# Patient Record
Sex: FEMALE | Race: WHITE | NOT HISPANIC OR LATINO | URBAN - METROPOLITAN AREA
[De-identification: names, ages, dates, MRNs, and addresses within clinical notes are randomized per-mention and may not be internally consistent; named-entity substitution may affect disease eponyms.]

---

## 2018-06-28 ENCOUNTER — IMPORTED ENCOUNTER (OUTPATIENT)
Dept: URBAN - METROPOLITAN AREA CLINIC 50 | Facility: CLINIC | Age: 73
End: 2018-06-28

## 2018-07-03 ENCOUNTER — IMPORTED ENCOUNTER (OUTPATIENT)
Dept: URBAN - METROPOLITAN AREA CLINIC 50 | Facility: CLINIC | Age: 73
End: 2018-07-03

## 2019-04-17 ENCOUNTER — IMPORTED ENCOUNTER (OUTPATIENT)
Dept: URBAN - METROPOLITAN AREA CLINIC 50 | Facility: CLINIC | Age: 74
End: 2019-04-17

## 2020-07-27 ENCOUNTER — IMPORTED ENCOUNTER (OUTPATIENT)
Dept: URBAN - METROPOLITAN AREA CLINIC 50 | Facility: CLINIC | Age: 75
End: 2020-07-27

## 2021-04-18 ASSESSMENT — VISUAL ACUITY
OS_CC: J2@ 13 IN
OD_PH: 20/15
OS_SC: 20/30+2
OD_SC: 20/40+2
OD_CC: J2@ 13 IN

## 2021-04-18 ASSESSMENT — TONOMETRY
OD_IOP_MMHG: 17
OS_IOP_MMHG: 17

## 2022-02-24 ENCOUNTER — NEW PATIENT (OUTPATIENT)
Dept: URBAN - METROPOLITAN AREA CLINIC 48 | Facility: CLINIC | Age: 77
End: 2022-02-24

## 2022-02-24 DIAGNOSIS — H35.3131: ICD-10-CM

## 2022-02-24 DIAGNOSIS — D31.32: ICD-10-CM

## 2022-02-24 DIAGNOSIS — H34.8322: ICD-10-CM

## 2022-02-24 DIAGNOSIS — H35.373: ICD-10-CM

## 2022-02-24 PROCEDURE — 92134 CPTRZ OPH DX IMG PST SGM RTA: CPT

## 2022-02-24 PROCEDURE — 92004 COMPRE OPH EXAM NEW PT 1/>: CPT

## 2022-02-24 ASSESSMENT — KERATOMETRY
OD_AXISANGLE2_DEGREES: 95
OD_K1POWER_DIOPTERS: 44.75
OD_K2POWER_DIOPTERS: 45.75
OS_AXISANGLE2_DEGREES: 75
OS_AXISANGLE_DEGREES: 165
OS_K1POWER_DIOPTERS: 44.25
OD_AXISANGLE_DEGREES: 5
OS_K2POWER_DIOPTERS: 45.25

## 2022-02-24 ASSESSMENT — VISUAL ACUITY
OU_SC: J1
OS_SC: 20/40
OD_SC: 20/40

## 2022-02-24 ASSESSMENT — TONOMETRY
OS_IOP_MMHG: 18
OD_IOP_MMHG: 18

## 2022-09-26 ENCOUNTER — TELEPHONE (OUTPATIENT)
Dept: CARDIOLOGY | Facility: CLINIC | Age: 77
End: 2022-09-26

## 2022-09-26 NOTE — TELEPHONE ENCOUNTER
Caller: Sallie Daily    Relationship: Self    Best call back number: [phone]    What is the best time to reach you: ANY    Who are you requesting to speak with (clinical staff, provider,  specific staff member): ANY        What was the call regarding: PT WILL BE SEEN FOR NEW PT APPOINTMENT ON 11.23.22 WHICH IS LITTLE BIT FAR OUT. SHE HAS A BLOOD PRESSURE READING /100. SHE IS WANTING TO BE CALLED IF THERE ARE ANY CANCELLATIONS OR IF SHE COULD BE SEEN A LITTLE SOONER    Do you require a callback: YES

## 2022-10-04 ENCOUNTER — TELEPHONE (OUTPATIENT)
Dept: INTERNAL MEDICINE | Facility: CLINIC | Age: 77
End: 2022-10-04

## 2022-10-06 ENCOUNTER — OFFICE VISIT (OUTPATIENT)
Dept: INTERNAL MEDICINE | Facility: CLINIC | Age: 77
End: 2022-10-06

## 2022-10-06 VITALS
WEIGHT: 186 LBS | BODY MASS INDEX: 36.52 KG/M2 | DIASTOLIC BLOOD PRESSURE: 80 MMHG | RESPIRATION RATE: 16 BRPM | HEIGHT: 60 IN | SYSTOLIC BLOOD PRESSURE: 186 MMHG | HEART RATE: 75 BPM | TEMPERATURE: 98 F | OXYGEN SATURATION: 97 %

## 2022-10-06 DIAGNOSIS — G25.81 RESTLESS LEG SYNDROME: ICD-10-CM

## 2022-10-06 DIAGNOSIS — E78.2 MIXED HYPERLIPIDEMIA: ICD-10-CM

## 2022-10-06 DIAGNOSIS — L29.9 PRURITIC DERMATITIS: ICD-10-CM

## 2022-10-06 DIAGNOSIS — Z23 NEED FOR IMMUNIZATION AGAINST INFLUENZA: ICD-10-CM

## 2022-10-06 DIAGNOSIS — I10 BENIGN ESSENTIAL HYPERTENSION: Primary | ICD-10-CM

## 2022-10-06 DIAGNOSIS — F41.9 ANXIETY: ICD-10-CM

## 2022-10-06 DIAGNOSIS — F33.0 MILD EPISODE OF RECURRENT MAJOR DEPRESSIVE DISORDER: ICD-10-CM

## 2022-10-06 DIAGNOSIS — F51.04 INSOMNIA, PSYCHOPHYSIOLOGICAL: ICD-10-CM

## 2022-10-06 DIAGNOSIS — R73.01 IMPAIRED FASTING GLUCOSE: ICD-10-CM

## 2022-10-06 DIAGNOSIS — E03.8 ADULT ONSET HYPOTHYROIDISM: ICD-10-CM

## 2022-10-06 PROCEDURE — 99204 OFFICE O/P NEW MOD 45 MIN: CPT | Performed by: INTERNAL MEDICINE

## 2022-10-06 PROCEDURE — 90662 IIV NO PRSV INCREASED AG IM: CPT | Performed by: INTERNAL MEDICINE

## 2022-10-06 PROCEDURE — G0008 ADMIN INFLUENZA VIRUS VAC: HCPCS | Performed by: INTERNAL MEDICINE

## 2022-10-06 RX ORDER — TRIAMCINOLONE ACETONIDE 1 MG/G
1 CREAM TOPICAL 2 TIMES DAILY
Qty: 15 G | Refills: 5 | Status: SHIPPED | OUTPATIENT
Start: 2022-10-06

## 2022-10-06 RX ORDER — ALLOPURINOL 300 MG/1
300 TABLET ORAL DAILY
Qty: 30 TABLET | Refills: 3 | Status: SHIPPED | OUTPATIENT
Start: 2022-10-06

## 2022-10-06 NOTE — PROGRESS NOTES
Subjective   Sallie Daily is a 77 y.o. female.     Chief Complaint   Patient presents with   • Establish Care   • Hypertension   • Hyperlipidemia   • Hypothyroidism       History of Present Illness   HPI: Patient is here for initial visit, today she is accompanied by her daughter, patient is here to follow up on the blood pressure which is noted to be elevated, the patient is taking the blood pressure medications as prescribed and has had no side effects. The patient is also here to follow up on the cholesterol and is trying to follow a diet. The patient is  also here to follow up on thyroid and is  due to get lab work done .  The patient also complains of restless legs and needs refills on medications .  She complains of chronic leg swelling for which she is on chlorthalidone and had cellulitis recently she does have itching in her lower extremities, she needs a flu shot today, patient also complains of anxiety and depression and insomnia, she states she recently had gout and was put on allopurinol and needs refills  Hyperlipidemia   Pertinent negatives include no chest pain or shortness of breath.   Hypertension   Pertinent negatives include no chest pain, palpitations or shortness of breath.    Review of Systems  Rash  Edema  Anxiety  Depression  Insomnia  Restless leg    Past Medical History:   Diagnosis Date   • Anemia    • Arthritis    • Cellulitis    • Colon polyp    • Diverticulitis    • GERD (gastroesophageal reflux disease)    • Gout    • Heart attack (HCC)     3/2021  mi   • Hyperlipidemia    • Osteoporosis    • Thyroid disease        Past Surgical History:   Procedure Laterality Date   • COLONOSCOPY      2/2022, q1yr , [facility]   • NECK SURGERY     • REPLACEMENT TOTAL KNEE     • ROTATOR CUFF REPAIR         Family History   Problem Relation Age of Onset   • Thyroid disease Mother    • Osteoporosis Mother    • Obesity Mother    • Heart attack Mother    • Arthritis Mother    • Obesity Father     • Heart attack Father    • Cancer Father         prostate   • Thyroid disease Sister    • Thyroid disease Sister    • Thyroid disease Daughter         reports that she has never smoked. She has never used smokeless tobacco. She reports current alcohol use. She reports that she does not use drugs.    Allergies   Allergen Reactions   • Sulfa Antibiotics Hives and Other (See Comments)           Current Outpatient Medications:   •  Bacillus Coagulans-Inulin (Probiotic) 1-250 BILLION-MG capsule, Probiotic, Disp: , Rfl:   •  Cholecalciferol 125 MCG (5000 UT) tablet, Vitamin D3 125 mcg (5,000 unit) tablet  Take by oral route., Disp: , Rfl:   •  Cinnamon 500 MG capsule, Cinnamon  QD, Disp: , Rfl:   •  cloNIDine (CATAPRES) 0.1 MG tablet, Take 0.1 mg by mouth Daily., Disp: , Rfl:   •  cyanocobalamin (VITAMIN B-12) 1000 MCG tablet, cyanocobalamin (vitamin B-12), Disp: , Rfl:   •  Iron-Vitamin C 100-250 MG tablet, iron  QD, Disp: , Rfl:   •  levothyroxine (SYNTHROID, LEVOTHROID) 150 MCG tablet, levothyroxine 150 mcg tablet, Disp: , Rfl:   •  Melatonin 10 MG capsule, melatonin 10 mg capsule  Take 1 capsule every day by oral route., Disp: , Rfl:   •  pramipexole (MIRAPEX) 0.5 MG tablet, Every 12 (Twelve) Hours., Disp: , Rfl:   •  sertraline (ZOLOFT) 100 MG tablet, Zoloft 100 mg tablet  Take 1 tablet every day by oral route., Disp: , Rfl:   •  tiZANidine (ZANAFLEX) 4 MG tablet, tizanidine 4 mg tablet, Disp: , Rfl:   •  valsartan (DIOVAN) 320 MG tablet, valsartan 320 mg tablet, Disp: , Rfl:   •  allopurinol (Zyloprim) 300 MG tablet, Take 1 tablet by mouth Daily., Disp: 30 tablet, Rfl: 3  •  atorvastatin (LIPITOR) 20 MG tablet, Take 1 tablet by mouth Daily., Disp: , Rfl:   •  chlorthalidone (HYGROTON) 25 MG tablet, Take 1 tablet by mouth Daily., Disp: , Rfl:   •  levocetirizine (XYZAL) 5 MG tablet, Take 1 tablet by mouth Daily., Disp: , Rfl:   •  Omega-3 Fatty Acids (fish oil) 1000 MG capsule capsule, Take 1,000 mg by mouth Daily  "With Breakfast., Disp: , Rfl:   •  omeprazole (priLOSEC) 20 MG capsule, Take 1 capsule by mouth Daily., Disp: , Rfl:   •  triamcinolone (KENALOG) 0.1 % cream, Apply 1 application topically to the appropriate area as directed 2 (Two) Times a Day., Disp: 15 g, Rfl: 5  •  TURMERIC CURCUMIN PO, Take 1,000 mg by mouth Daily., Disp: , Rfl:       Objective   Blood pressure (!) 186/80, pulse 75, temperature 98 °F (36.7 °C), resp. rate 16, height 152.4 cm (60\"), weight 84.4 kg (186 lb), SpO2 97 %.    Physical Exam  Vitals and nursing note reviewed.   Constitutional:       General: She is not in acute distress.     Appearance: Normal appearance. She is not diaphoretic.   HENT:      Head: Normocephalic and atraumatic.      Right Ear: External ear normal.      Left Ear: External ear normal.      Nose: Nose normal.   Eyes:      Extraocular Movements: Extraocular movements intact.      Conjunctiva/sclera: Conjunctivae normal.   Neck:      Trachea: Trachea normal.   Cardiovascular:      Rate and Rhythm: Normal rate and regular rhythm.      Heart sounds: Normal heart sounds.   Pulmonary:      Effort: Pulmonary effort is normal. No respiratory distress.      Breath sounds: Normal breath sounds.   Abdominal:      General: Abdomen is flat.   Musculoskeletal:      Cervical back: Neck supple.      Right lower leg: Edema present.      Left lower leg: Edema present.      Comments: Moves all limbs  walks with walker   Skin:     General: Skin is warm.      Findings: Erythema present.   Neurological:      Mental Status: She is alert and oriented to person, place, and time.      Comments: No gross motor or sensory deficits         Class 2 Severe Obesity (BMI >=35 and <=39.9). Obesity-related health conditions include the following: hypertension. Obesity is improving with lifestyle modifications. BMI is is above average; BMI management plan is completed. We discussed low calorie, low carb based diet program, portion control, increasing " exercise and joining a fitness center or start home based exercise program.      No results found for this or any previous visit.      Assessment & Plan   Diagnoses and all orders for this visit:    1. Benign essential hypertension (Primary)    2. Mixed hyperlipidemia  -     CBC & Differential  -     Comprehensive Metabolic Panel  -     Lipid Panel    3. Impaired fasting glucose  -     Hemoglobin A1c    4. Adult onset hypothyroidism  -     TSH    5. Restless leg syndrome    6. Anxiety    7. Mild episode of recurrent major depressive disorder (HCC)    8. Insomnia, psychophysiological    9. Need for immunization against influenza  -     Fluzone High-Dose 65+yrs ([id number])    10. Pruritic dermatitis  -     triamcinolone (KENALOG) 0.1 % cream; Apply 1 application topically to the appropriate area as directed 2 (Two) Times a Day.  Dispense: 15 g; Refill: 5    Other orders  -     allopurinol (Zyloprim) 300 MG tablet; Take 1 tablet by mouth Daily.  Dispense: 30 tablet; Refill: 3      Plan:  1.  Benign essential hypertension: Will continue current medication, low-sodium diet advised, Counseled to regularly check BP at home with goal averaging <130/80.   2.mixed hyperlipidemia: will obtain   fasting CMP and lipid panel.  Diet and exercise counseled,    Will continue current medications  3.  Impaired glucose: will obtain   fasting CMP  and hba1c  , diet and exercise counseled ,   4. hypothyroidism: will obtain tsh , and continue levothyroxine  5.  Restless leg syndrome: Continue pramipexole  6.  Anxiety disorder: We will obtain labs and continue Zoloft  7.  Major depression: We will obtain labs and continue Zoloft  8.  Insomnia: We will monitor, on melatonin  9.  Need for flu vaccine: Given today and well-tolerated  10.  Pruritic dermatitis: Topical triamcinolone  11.  Gout: Refill allopurinol  I spent 45  minutes caring for  [place]  on this date of service. This time includes time spent by me in the following  activities:preparing for the visit, reviewing tests, performing a medically appropriate examination and/or evaluation , counseling and educating the patient/family/caregiver, ordering medications, tests, or procedures and documenting information in the medical record             Merle Fernández MD

## 2022-10-07 RX ORDER — CHLORTHALIDONE 25 MG/1
1 TABLET ORAL DAILY
COMMUNITY
Start: 2022-08-31 | End: 2022-10-19 | Stop reason: SDUPTHER

## 2022-10-07 RX ORDER — LEVOCETIRIZINE DIHYDROCHLORIDE 5 MG/1
1 TABLET, FILM COATED ORAL DAILY
COMMUNITY
Start: 2022-07-04

## 2022-10-07 RX ORDER — CHLORAL HYDRATE 500 MG
1000 CAPSULE ORAL
COMMUNITY

## 2022-10-07 RX ORDER — ATORVASTATIN CALCIUM 20 MG/1
1 TABLET, FILM COATED ORAL DAILY
COMMUNITY
Start: 2022-09-15 | End: 2022-10-16 | Stop reason: SDUPTHER

## 2022-10-07 RX ORDER — OMEPRAZOLE 20 MG/1
1 CAPSULE, DELAYED RELEASE ORAL DAILY
COMMUNITY
Start: 2022-07-21

## 2022-10-13 ENCOUNTER — OFFICE VISIT (OUTPATIENT)
Dept: INTERNAL MEDICINE | Facility: CLINIC | Age: 77
End: 2022-10-13

## 2022-10-13 VITALS
OXYGEN SATURATION: 92 % | HEIGHT: 60 IN | TEMPERATURE: 98 F | WEIGHT: 184 LBS | HEART RATE: 101 BPM | BODY MASS INDEX: 36.12 KG/M2 | SYSTOLIC BLOOD PRESSURE: 183 MMHG | RESPIRATION RATE: 16 BRPM | DIASTOLIC BLOOD PRESSURE: 78 MMHG

## 2022-10-13 DIAGNOSIS — I10 BENIGN ESSENTIAL HYPERTENSION: Primary | ICD-10-CM

## 2022-10-13 DIAGNOSIS — E03.8 ADULT ONSET HYPOTHYROIDISM: ICD-10-CM

## 2022-10-13 DIAGNOSIS — N18.30 STAGE 3 CHRONIC KIDNEY DISEASE, UNSPECIFIED WHETHER STAGE 3A OR 3B CKD: ICD-10-CM

## 2022-10-13 DIAGNOSIS — E78.2 MIXED HYPERLIPIDEMIA: ICD-10-CM

## 2022-10-13 PROCEDURE — 99214 OFFICE O/P EST MOD 30 MIN: CPT | Performed by: INTERNAL MEDICINE

## 2022-10-13 RX ORDER — HYDRALAZINE HYDROCHLORIDE 25 MG/1
25 TABLET, FILM COATED ORAL 2 TIMES DAILY
Qty: 60 TABLET | Refills: 5 | Status: SHIPPED | OUTPATIENT
Start: 2022-10-13 | End: 2022-10-19 | Stop reason: SDUPTHER

## 2022-10-13 RX ORDER — CARVEDILOL 25 MG/1
25 TABLET ORAL 2 TIMES DAILY WITH MEALS
Qty: 60 TABLET | Refills: 5 | Status: SHIPPED | OUTPATIENT
Start: 2022-10-13 | End: 2022-10-19 | Stop reason: SDUPTHER

## 2022-10-13 RX ORDER — CLONIDINE 0.2 MG/24H
1 PATCH, EXTENDED RELEASE TRANSDERMAL WEEKLY
COMMUNITY
Start: 2022-10-06 | End: 2022-10-13

## 2022-10-13 NOTE — PROGRESS NOTES
"Chief Complaint  Hypertension, Hyperlipidemia, and Chronic Kidney Disease    Subjective    {Problem List  Visit Diagnosis   Encounters  Notes  Medications  Labs  Result Review Imaging  Media :23}    Sallie Daily presents to [facility]  History of Present Illness   HPI: Patient is here to follow up on the blood pressure which is noted to be elevated, she has clonidine pills at home and patch and she has been using the clonidine patch and her blood pressure is yet elevated, the patient is also here to follow up on the cholesterol and is trying to follow a diet. The patient is  also here to follow up on thyroid and CKD and had lab work done .  The patient also needs refills on medications .  She has brought in her labs from her previous PCP for review  Hyperlipidemia   Pertinent negatives include no chest pain or shortness of breath.   Hypertension   Pertinent negatives include no chest pain, palpitations or shortness of breath.    Answers for HPI/ROS submitted by the patient on 10/11/2022  Please describe your symptoms.: Follow-up  Have you had these symptoms before?: Yes  How long have you been having these symptoms?: Greater than 2 weeks  Please list any medications you are currently taking for this condition.: Allopurinol  What is the primary reason for your visit?: Other      Objective   Vital Signs:  BP (!) 183/78   Pulse 101   Temp 98 °F (36.7 °C)   Resp 16   Ht 152.4 cm (60\")   Wt 83.5 kg (184 lb)   SpO2 92%   BMI 35.94 kg/m²   Estimated body mass index is 35.94 kg/m² as calculated from the following:    Height as of this encounter: 152.4 cm (60\").    Weight as of this encounter: 83.5 kg (184 lb).      Physical Exam  Vitals and nursing note reviewed.   Constitutional:       General: She is not in acute distress.     Appearance: Normal appearance. She is not diaphoretic.   HENT:      Head: Normocephalic and atraumatic.      Right Ear: External ear normal.      " Left Ear: External ear normal.      Nose: Nose normal.   Eyes:      Extraocular Movements: Extraocular movements intact.      Conjunctiva/sclera: Conjunctivae normal.   Neck:      Trachea: Trachea normal.   Cardiovascular:      Rate and Rhythm: Normal rate and regular rhythm.      Heart sounds: Normal heart sounds.   Pulmonary:      Effort: Pulmonary effort is normal. No respiratory distress.      Breath sounds: Normal breath sounds.   Abdominal:      General: Abdomen is flat.   Musculoskeletal:      Cervical back: Neck supple.      Right lower leg: Edema present.      Left lower leg: Edema present.      Comments: Moves all limbs   Skin:     General: Skin is warm.   Neurological:      Mental Status: She is alert and oriented to person, place, and time.      Comments: No gross motor or sensory deficits        Result Review :                Assessment and Plan   Diagnoses and all orders for this visit:    1. Benign essential hypertension (Primary)  -     carvedilol (Coreg) 25 MG tablet; Take 1 tablet by mouth 2 (Two) Times a Day With Meals.  Dispense: 60 tablet; Refill: 5  -     hydrALAZINE (APRESOLINE) 25 MG tablet; Take 1 tablet by mouth 2 (Two) Times a Day.  Dispense: 60 tablet; Refill: 5    2. Mixed hyperlipidemia  -     CBC & Differential  -     Comprehensive Metabolic Panel  -     Lipid Panel    3. Adult onset hypothyroidism  -     TSH    4. Stage 3 chronic kidney disease, unspecified whether stage 3a or 3b CKD (HCC)      Plan:  1.  Benign essential hypertension: Patient advised to stop clonidine at this time and use it as needed and parameters provided, will start carvedilol 25 mg p.o. twice daily and hydralazine 25 mg p.o. twice daily, continue valsartan 320 mg daily with chlorthalidone 25 mg daily, low-sodium diet advised, Counseled to regularly check BP at home with goal averaging <130/80.   2.mixed hyperlipidemia:  reviewed  fasting CMP and lipid panel.  Diet and exercise counseled,  Will continue current  medications  3. hypothyroidism:  reviewed  tsh , and continue levothyroxine  4.  CKD stage III: Oral hydration, monitor BMP     I spent 30 minutes caring for [place] on this date of service. This time includes time spent by me in the following activities:preparing for the visit, reviewing tests, performing a medically appropriate examination and/or evaluation , counseling and educating the patient/family/caregiver, ordering medications, tests, or procedures and documenting information in the medical record  Follow Up   Patient was given instructions and counseling regarding her condition or for health maintenance advice. Please see specific information pulled into the AVS if appropriate.

## 2022-10-17 NOTE — TELEPHONE ENCOUNTER
Rx Refill Note  Requested Prescriptions     Pending Prescriptions Disp Refills   • levothyroxine (SYNTHROID, LEVOTHROID) 150 MCG tablet     • pramipexole (MIRAPEX) 0.5 MG tablet       Sig: Every 12 (Twelve) Hours.   • atorvastatin (LIPITOR) 20 MG tablet 90 tablet      Sig: Take 1 tablet by mouth Daily.      Last office visit with prescribing clinician: 10/13/2022      Next office visit with prescribing clinician:             Corinna Pavon LPN  10/17/22, 16:50 EDT

## 2022-10-19 ENCOUNTER — OFFICE VISIT (OUTPATIENT)
Dept: INTERNAL MEDICINE | Facility: CLINIC | Age: 77
End: 2022-10-19

## 2022-10-19 VITALS
WEIGHT: 186 LBS | RESPIRATION RATE: 16 BRPM | OXYGEN SATURATION: 98 % | DIASTOLIC BLOOD PRESSURE: 82 MMHG | HEIGHT: 60 IN | HEART RATE: 73 BPM | SYSTOLIC BLOOD PRESSURE: 188 MMHG | BODY MASS INDEX: 36.52 KG/M2 | TEMPERATURE: 97.8 F

## 2022-10-19 DIAGNOSIS — I10 BENIGN ESSENTIAL HYPERTENSION: Primary | ICD-10-CM

## 2022-10-19 DIAGNOSIS — R60.0 LOCALIZED EDEMA: ICD-10-CM

## 2022-10-19 DIAGNOSIS — N18.30 STAGE 3 CHRONIC KIDNEY DISEASE, UNSPECIFIED WHETHER STAGE 3A OR 3B CKD: ICD-10-CM

## 2022-10-19 PROCEDURE — 99214 OFFICE O/P EST MOD 30 MIN: CPT | Performed by: INTERNAL MEDICINE

## 2022-10-19 RX ORDER — HYDRALAZINE HYDROCHLORIDE 50 MG/1
50 TABLET, FILM COATED ORAL 2 TIMES DAILY
Qty: 180 TABLET | Refills: 1 | Status: SHIPPED | OUTPATIENT
Start: 2022-10-19 | End: 2022-11-23 | Stop reason: SDUPTHER

## 2022-10-19 RX ORDER — LIOTHYRONINE SODIUM 5 UG/1
5 TABLET ORAL DAILY
COMMUNITY
End: 2022-11-23

## 2022-10-19 RX ORDER — CHLORTHALIDONE 25 MG/1
25 TABLET ORAL DAILY
Qty: 90 TABLET | Refills: 1 | Status: SHIPPED | OUTPATIENT
Start: 2022-10-19

## 2022-10-19 RX ORDER — VALSARTAN 320 MG/1
320 TABLET ORAL DAILY
Qty: 90 TABLET | Refills: 1 | Status: SHIPPED | OUTPATIENT
Start: 2022-10-19

## 2022-10-19 RX ORDER — CARVEDILOL 25 MG/1
25 TABLET ORAL 2 TIMES DAILY WITH MEALS
Qty: 180 TABLET | Refills: 1 | Status: SHIPPED | OUTPATIENT
Start: 2022-10-19

## 2022-10-19 NOTE — PROGRESS NOTES
"Chief Complaint  Hypertension and Chronic Kidney Disease    Subjective        Sallie Daily presents to [facility]  History of Present Illness   HPI: Patient is here to follow up on the blood pressure which is noted to be elevated, home blood pressure readings have been reviewed with her today, her blood pressure medications were readjusted at the last visit and the patient is taking the blood pressure medications as prescribed and has had no side effects. The patient is also here to follow up on leg swelling and CKD   Hypertension   Pertinent negatives include no chest pain, palpitations or shortness of breath.    Answers for HPI/ROS submitted by the patient on 10/13/2022  What is the primary reason for your visit?: High Blood Pressure      Objective   Vital Signs:  BP (!) 188/82   Pulse 73   Temp 97.8 °F (36.6 °C)   Resp 16   Ht 152.4 cm (60\")   Wt 84.4 kg (186 lb)   SpO2 98%   BMI 36.33 kg/m²   Estimated body mass index is 36.33 kg/m² as calculated from the following:    Height as of this encounter: 152.4 cm (60\").    Weight as of this encounter: 84.4 kg (186 lb).    Class 2 Severe Obesity (BMI >=35 and <=39.9). Obesity-related health conditions include the following: hypertension and dyslipidemias. Obesity is improving with lifestyle modifications. BMI is is above average; BMI management plan is completed. We discussed low calorie, low carb based diet program, portion control, increasing exercise and joining a fitness center or start home based exercise program.      Physical Exam  Vitals and nursing note reviewed.   Constitutional:       General: She is not in acute distress.     Appearance: Normal appearance. She is not diaphoretic.   HENT:      Head: Normocephalic and atraumatic.      Right Ear: External ear normal.      Left Ear: External ear normal.      Nose: Nose normal.   Eyes:      Extraocular Movements: Extraocular movements intact.      Conjunctiva/sclera: " Conjunctivae normal.   Neck:      Trachea: Trachea normal.   Cardiovascular:      Rate and Rhythm: Normal rate and regular rhythm.      Heart sounds: Normal heart sounds.   Pulmonary:      Effort: Pulmonary effort is normal. No respiratory distress.      Breath sounds: Normal breath sounds.   Abdominal:      General: Abdomen is flat.   Musculoskeletal:      Cervical back: Neck supple.      Comments: Moves all limbs   Skin:     General: Skin is warm.   Neurological:      Mental Status: She is alert and oriented to person, place, and time.      Comments: No gross motor or sensory deficits        Result Review :                Assessment and Plan   Diagnoses and all orders for this visit:    1. Benign essential hypertension (Primary)  -     hydrALAZINE (APRESOLINE) 50 MG tablet; Take 1 tablet by mouth 2 (Two) Times a Day.  Dispense: 180 tablet; Refill: 1  -     chlorthalidone (HYGROTON) 25 MG tablet; Take 1 tablet by mouth Daily.  Dispense: 90 tablet; Refill: 1  -     carvedilol (Coreg) 25 MG tablet; Take 1 tablet by mouth 2 (Two) Times a Day With Meals.  Dispense: 180 tablet; Refill: 1  -     valsartan (DIOVAN) 320 MG tablet; Take 1 tablet by mouth Daily.  Dispense: 90 tablet; Refill: 1    2. Stage 3 chronic kidney disease, unspecified whether stage 3a or 3b CKD (HCC)    3. Localized edema    Plan:  1.  Benign essential hypertension: Will continue current medication and increase to hydralazine 50 mg p.o. twice daily, low-sodium diet advised, Counseled to regularly check BP at home with goal averaging <130/80.   2.  CKD stage III: Oral hydration, monitor BMP  3.  Edema: We will continue diuretic, low-sodium diet advised      I spent 30 minutes caring for [place] on this date of service. This time includes time spent by me in the following activities:preparing for the visit, reviewing tests, performing a medically appropriate examination and/or evaluation , counseling and educating the patient/family/caregiver,  ordering medications, tests, or procedures and documenting information in the medical record  Follow Up   Return in about 2 months (around 12/20/2022).  Patient was given instructions and counseling regarding her condition or for health maintenance advice. Please see specific information pulled into the AVS if appropriate.

## 2022-10-20 RX ORDER — ATORVASTATIN CALCIUM 20 MG/1
20 TABLET, FILM COATED ORAL DAILY
Qty: 90 TABLET | Refills: 1 | Status: SHIPPED | OUTPATIENT
Start: 2022-10-20

## 2022-10-20 RX ORDER — LEVOTHYROXINE SODIUM 0.15 MG/1
150 TABLET ORAL DAILY
Qty: 90 TABLET | Refills: 1 | Status: SHIPPED | OUTPATIENT
Start: 2022-10-20

## 2022-10-20 RX ORDER — PRAMIPEXOLE DIHYDROCHLORIDE 0.5 MG/1
0.5 TABLET ORAL EVERY 12 HOURS SCHEDULED
Qty: 180 TABLET | Refills: 1 | Status: SHIPPED | OUTPATIENT
Start: 2022-10-20

## 2022-10-27 NOTE — TELEPHONE ENCOUNTER
Rx Refill Note  Requested Prescriptions     Pending Prescriptions Disp Refills   • liothyronine (CYTOMEL) 5 MCG tablet       Sig: Take 1 tablet by mouth Daily.      Last office visit with prescribing clinician: 10/19/2022      Next office visit with prescribing clinician: 12/21/2022            Alhaji Ramey CMA  10/27/22, 07:59 EDT

## 2022-10-28 RX ORDER — LIOTHYRONINE SODIUM 5 UG/1
5 TABLET ORAL DAILY
OUTPATIENT
Start: 2022-10-28

## 2022-11-10 ENCOUNTER — OFFICE VISIT (OUTPATIENT)
Dept: CARDIOLOGY | Facility: CLINIC | Age: 77
End: 2022-11-10

## 2022-11-10 VITALS
RESPIRATION RATE: 18 BRPM | SYSTOLIC BLOOD PRESSURE: 148 MMHG | WEIGHT: 188 LBS | BODY MASS INDEX: 36.91 KG/M2 | HEIGHT: 60 IN | OXYGEN SATURATION: 94 % | HEART RATE: 73 BPM | DIASTOLIC BLOOD PRESSURE: 70 MMHG

## 2022-11-10 DIAGNOSIS — E66.01 SEVERE OBESITY (BMI 35.0-39.9) WITH COMORBIDITY: ICD-10-CM

## 2022-11-10 DIAGNOSIS — I25.10 CORONARY ARTERY DISEASE INVOLVING NATIVE CORONARY ARTERY OF NATIVE HEART WITHOUT ANGINA PECTORIS: ICD-10-CM

## 2022-11-10 DIAGNOSIS — N18.30 STAGE 3 CHRONIC KIDNEY DISEASE, UNSPECIFIED WHETHER STAGE 3A OR 3B CKD: ICD-10-CM

## 2022-11-10 DIAGNOSIS — I10 ESSENTIAL HYPERTENSION: Primary | ICD-10-CM

## 2022-11-10 DIAGNOSIS — E78.00 PURE HYPERCHOLESTEROLEMIA: ICD-10-CM

## 2022-11-10 DIAGNOSIS — I44.7 LBBB (LEFT BUNDLE BRANCH BLOCK): ICD-10-CM

## 2022-11-10 PROCEDURE — 93000 ELECTROCARDIOGRAM COMPLETE: CPT | Performed by: INTERNAL MEDICINE

## 2022-11-10 PROCEDURE — 99204 OFFICE O/P NEW MOD 45 MIN: CPT | Performed by: INTERNAL MEDICINE

## 2022-11-10 NOTE — PROGRESS NOTES
[facility] Cardiology OP Consult Note    Sallie Daily  [id number]  11/10/2022    Referred By: Referring, Self    Chief Complaint: Hypertension    History of Present Illness:   Mrs. Sallie Daily is a 77 y.o. female who presents to the Cardiology Clinic for evaluation of hypertension.  The patient has a past medical history significant for hyperlipidemia, hypothyroidism, carotid artery disease with prior endarterectomy, stage III chronic kidney disease, and difficult to control hypertension.  She has a past cardiac history significant for coronary artery disease, with a coronary angiogram in 2019 showing mild to moderate nonobstructive disease.  She presents today for further management of her blood pressure.  The patient reports she has been checking her blood pressure at home, with her systolic BP maintaining in the 160s-180s.  She reports compliance with her current antihypertensive medications.  She does report a prior history of intolerance to amlodipine.  She denies any significant chest pain or exertional chest discomfort.  She reports chronic lower extremity edema, which is currently at baseline.  No significant exertional dyspnea or orthopnea.  No other specific complaints today.    Past Cardiac Testin. Last Coronary Angio:    1.  30% stenosis in the mid left main   2.  50% stenosis in the ostial LCx   3.  50% stenosis in the RPDA  2. Prior Stress Testing: MPS    1.  No evidence of inducible ischemia  3. Last Echo:    1.  LVEF 60%   2.  Mild concentric LVH   3.  Diastolic dysfunction  4. Prior Holter Monitor: None    Review of Systems:   Review of Systems   Constitutional: Negative for activity change, appetite change, chills, diaphoresis, fatigue, fever, unexpected weight gain and unexpected weight loss.   Eyes: Negative for blurred vision and double vision.   Respiratory: Negative for cough, chest tightness, shortness of breath and wheezing.    Cardiovascular:  Positive for leg swelling. Negative for chest pain and palpitations.   Gastrointestinal: Negative for abdominal pain, anal bleeding, blood in stool and GERD.   Endocrine: Negative for cold intolerance and heat intolerance.   Genitourinary: Negative for hematuria.   Neurological: Negative for dizziness, syncope, weakness and light-headedness.   Hematological: Does not bruise/bleed easily.   Psychiatric/Behavioral: Negative for depressed mood and stress. The patient is not nervous/anxious.        Past Medical History:   Past Medical History:   Diagnosis Date   • Allergic Sulfa   • Anemia    • Arthritis    • Cataract Removed   • Cellulitis    • CHF (congestive heart failure) ([facility]) 11/21   • Colon polyp    • Diverticulitis    • Diverticulosis Several   • GERD (gastroesophageal reflux disease)    • Gout    • Heart attack ([facility])     3/2021  mi   • HL (hearing loss)    • Hyperlipidemia    • Hypertension    • Hyperthyroidism Caught early   • Inflammatory bowel disease [employer] device   • Low back pain    • Obesity    • Osteoporosis    • Pneumonia    • Thyroid disease    • Urinary tract infection Once       Past Surgical History:   Past Surgical History:   Procedure Laterality Date   • COLON SURGERY  1.5 ft removed   • COLONOSCOPY      2/2022, q1yr , [facility]   • EYE SURGERY  Cataract/lens   • HERNIA REPAIR  5 times   • HYSTERECTOMY  1990 ?   • JOINT REPLACEMENT  Knee, rotator cuff x 2   • NECK SURGERY     • REPLACEMENT TOTAL KNEE     • ROTATOR CUFF REPAIR     • SINUS SURGERY  2021   • SPINE SURGERY  Upper spine   • TONSILLECTOMY  1950 ?   • TUBAL ABDOMINAL LIGATION         Family History:   Family History   Problem Relation Age of Onset   • Thyroid disease Mother    • Osteoporosis Mother    • Obesity Mother    • Heart attack Mother    • Arthritis Mother    • Obesity Father    • Heart attack Father    • Cancer Father         prostate   • Thyroid disease Sister    • Thyroid disease Sister    • Thyroid disease  Daughter        Social History:   Social History     Socioeconomic History   • Marital status:    Tobacco Use   • Smoking status: Former     Packs/day: 1.00     Years: 42.00     Pack years: 42.00     Types: Cigarettes     Start date: 10/1/1963     Quit date: 2005     Years since quittin.9   • Smokeless tobacco: Never   Vaping Use   • Vaping Use: Never used   Substance and Sexual Activity   • Alcohol use: Not Currently     Comment: Glass of wine occasionally   • Drug use: Never   • Sexual activity: Not Currently     Partners: Male     Birth control/protection: Hysterectomy       Medications:     Current Outpatient Medications:   •  allopurinol (Zyloprim) 300 MG tablet, Take 1 tablet by mouth Daily., Disp: 30 tablet, Rfl: 3  •  atorvastatin (LIPITOR) 20 MG tablet, Take 1 tablet by mouth Daily., Disp: 90 tablet, Rfl: 1  •  carvedilol (Coreg) 25 MG tablet, Take 1 tablet by mouth 2 (Two) Times a Day With Meals., Disp: 180 tablet, Rfl: 1  •  chlorthalidone (HYGROTON) 25 MG tablet, Take 1 tablet by mouth Daily., Disp: 90 tablet, Rfl: 1  •  Cholecalciferol 125 MCG (5000 UT) tablet, Vitamin D3 125 mcg (5,000 unit) tablet  Take by oral route., Disp: , Rfl:   •  Cinnamon 500 MG capsule, Cinnamon  QD, Disp: , Rfl:   •  cyanocobalamin (VITAMIN B-12) 1000 MCG tablet, cyanocobalamin (vitamin B-12), Disp: , Rfl:   •  hydrALAZINE (APRESOLINE) 50 MG tablet, Take 1 tablet by mouth 2 (Two) Times a Day., Disp: 180 tablet, Rfl: 1  •  Iron-Vitamin C 100-250 MG tablet, iron  QD, Disp: , Rfl:   •  levocetirizine (XYZAL) 5 MG tablet, Take 1 tablet by mouth Daily., Disp: , Rfl:   •  levothyroxine (SYNTHROID, LEVOTHROID) 150 MCG tablet, Take 1 tablet by mouth Daily. (Patient taking differently: Take 175 mcg by mouth Daily.), Disp: 90 tablet, Rfl: 1  •  Melatonin 10 MG capsule, melatonin 10 mg capsule  Take 1 capsule every day by oral route., Disp: , Rfl:   •  Omega-3 Fatty Acids (fish oil) 1000 MG capsule capsule, Take 1,000  "mg by mouth Daily With Breakfast., Disp: , Rfl:   •  omeprazole (priLOSEC) 20 MG capsule, Take 1 capsule by mouth Daily., Disp: , Rfl:   •  pramipexole (MIRAPEX) 0.5 MG tablet, Take 1 tablet by mouth Every 12 (Twelve) Hours., Disp: 180 tablet, Rfl: 1  •  sertraline (ZOLOFT) 100 MG tablet, Zoloft 100 mg tablet  Take 1 tablet every day by oral route., Disp: , Rfl:   •  tiZANidine (ZANAFLEX) 4 MG tablet, tizanidine 4 mg tablet, Disp: , Rfl:   •  triamcinolone (KENALOG) 0.1 % cream, Apply 1 application topically to the appropriate area as directed 2 (Two) Times a Day., Disp: 15 g, Rfl: 5  •  TURMERIC CURCUMIN PO, Take 1,000 mg by mouth Daily., Disp: , Rfl:   •  valsartan (DIOVAN) 320 MG tablet, Take 1 tablet by mouth Daily., Disp: 90 tablet, Rfl: 1  •  liothyronine (CYTOMEL) 5 MCG tablet, Take 1 tablet by mouth Daily., Disp: , Rfl:     Allergies:   Allergies   Allergen Reactions   • Sulfa Antibiotics Hives and Other (See Comments)       Physical Exam:  Vital Signs:   Vitals:    11/10/22 1103   BP: 148/70   BP Location: Right arm   Patient Position: Sitting   Pulse: 73   Resp: 18   SpO2: 94%   Weight: 85.3 kg (188 lb)   Height: 152.4 cm (60\")       Physical Exam  Constitutional:       General: She is not in acute distress.     Appearance: She is well-developed. She is obese. She is not diaphoretic.   HENT:      Head: Normocephalic and atraumatic.   Eyes:      General: No scleral icterus.     Pupils: Pupils are equal, round, and reactive to light.   Neck:      Trachea: No tracheal deviation.   Cardiovascular:      Rate and Rhythm: Normal rate and regular rhythm.      Heart sounds: Normal heart sounds. No murmur heard.    No friction rub. No gallop.      Comments: Normal JVD.  Pulmonary:      Effort: Pulmonary effort is normal. No respiratory distress.      Breath sounds: Normal breath sounds. No stridor. No wheezing or rales.   Chest:      Chest wall: No tenderness.   Abdominal:      General: Bowel sounds are normal. " There is no distension.      Palpations: Abdomen is soft.      Tenderness: There is no abdominal tenderness. There is no guarding or rebound.   Musculoskeletal:         General: Normal range of motion.      Cervical back: Neck supple. No tenderness.      Comments: 1+ bilateral lower extremity pitting edema   Lymphadenopathy:      Cervical: No cervical adenopathy.   Skin:     General: Skin is warm and dry.      Findings: No erythema.   Neurological:      General: No focal deficit present.      Mental Status: She is alert and oriented to person, place, and time.   Psychiatric:         Mood and Affect: Mood normal.         Behavior: Behavior normal.         Results Review:   I reviewed the patient's new clinical results.  I personally viewed and interpreted the patient's EKG/Telemetry data      ECG 12 Lead    Date/Time: 11/10/2022 12:11 PM  Performed by: Jonh Orona MD  Authorized by: Jonh Orona MD   Comparison: not compared with previous ECG   Rhythm: sinus rhythm  Rate: normal  Conduction: left bundle branch block  QRS axis: normal    Clinical impression: abnormal EKG            Assessment / Plan:     1. Essential hypertension  -- Long history of hypertension, currently uncontrolled with systolic BP consistently greater than 160 mmHg on home BP checks  -- Will continue carvedilol, valsartan, and chlorthalidone  -- Uptitrate hydralazine to 3 times daily dosing  -- History of intolerance to amlodipine  --Will obtain renal artery duplex to evaluate for secondary causes  -- Follow-up in 2 weeks for BP check    2. Coronary artery disease  -- History of mild to moderate nonobstructive coronary artery disease on coronary angiogram in 2019  -- Currently without chest pain or anginal symptoms  -- Continue atorvastatin  -- Discuss aspirin at next follow-up    3.  Left bundle branch block  -- Chronic    4.  Pure hypercholesterolemia  -- Lipid profile in 7/21 showed LDL 69, HDL 49, triglycerides 198  --Continue  statin    5. Stage 3 chronic kidney disease, unspecified whether stage 3a or 3b CKD (HCC)  -- GFR 45 on last available labs for review    6. Severe obesity (BMI 35.0-39.9)   -- Weight loss advised      Follow Up:   Return in about 2 weeks (around 11/24/2022).      Thank you for allowing me to participate in the care of your patient. Please to not hesitate to contact me with additional questions or concerns.     UMAIR Orona MD  Interventional Cardiology   11/10/2022  11:04 EST

## 2022-11-23 ENCOUNTER — OFFICE VISIT (OUTPATIENT)
Dept: CARDIOLOGY | Facility: CLINIC | Age: 77
End: 2022-11-23

## 2022-11-23 VITALS
HEART RATE: 76 BPM | OXYGEN SATURATION: 98 % | RESPIRATION RATE: 18 BRPM | WEIGHT: 187 LBS | SYSTOLIC BLOOD PRESSURE: 120 MMHG | DIASTOLIC BLOOD PRESSURE: 68 MMHG | HEIGHT: 60 IN | BODY MASS INDEX: 36.71 KG/M2

## 2022-11-23 DIAGNOSIS — I25.10 CORONARY ARTERY DISEASE INVOLVING NATIVE CORONARY ARTERY OF NATIVE HEART WITHOUT ANGINA PECTORIS: ICD-10-CM

## 2022-11-23 DIAGNOSIS — I10 ESSENTIAL HYPERTENSION: ICD-10-CM

## 2022-11-23 DIAGNOSIS — E78.00 PURE HYPERCHOLESTEROLEMIA: ICD-10-CM

## 2022-11-23 DIAGNOSIS — I10 BENIGN ESSENTIAL HYPERTENSION: ICD-10-CM

## 2022-11-23 DIAGNOSIS — N18.30 STAGE 3 CHRONIC KIDNEY DISEASE, UNSPECIFIED WHETHER STAGE 3A OR 3B CKD: ICD-10-CM

## 2022-11-23 DIAGNOSIS — E66.01 SEVERE OBESITY (BMI 35.0-39.9) WITH COMORBIDITY: ICD-10-CM

## 2022-11-23 DIAGNOSIS — I44.7 LBBB (LEFT BUNDLE BRANCH BLOCK): ICD-10-CM

## 2022-11-23 DIAGNOSIS — R19.7 DIARRHEA, UNSPECIFIED TYPE: Primary | ICD-10-CM

## 2022-11-23 PROCEDURE — 99214 OFFICE O/P EST MOD 30 MIN: CPT | Performed by: INTERNAL MEDICINE

## 2022-11-23 RX ORDER — HYDRALAZINE HYDROCHLORIDE 100 MG/1
100 TABLET, FILM COATED ORAL 3 TIMES DAILY
Qty: 180 TABLET | Refills: 5 | Status: SHIPPED | OUTPATIENT
Start: 2022-11-23

## 2022-11-23 RX ORDER — HYDRALAZINE HYDROCHLORIDE 100 MG/1
100 TABLET, FILM COATED ORAL 3 TIMES DAILY
Qty: 180 TABLET | Refills: 3 | Status: SHIPPED | OUTPATIENT
Start: 2022-11-23 | End: 2022-11-23 | Stop reason: SDUPTHER

## 2022-11-23 NOTE — PROGRESS NOTES
[facility] Cardiology Office Follow Up Note    Sallie Daily  [id number]  2022    Primary Care Provider: Merle Fernández MD    Chief Complaint: Follow-up of hypertension    History of Present Illness:   Mrs. Sallie Daily is a 77 y.o. female who presents to the Cardiology Clinic for follow-up of hypertension The patient has a past medical history significant for hyperlipidemia, hypothyroidism, carotid artery disease with prior endarterectomy, stage III chronic kidney disease, and difficult to control hypertension.  She has a past cardiac history significant for coronary artery disease, with a coronary angiogram in 2019 showing mild to moderate nonobstructive disease.  She returns to the cardiology clinic today for further management of uncontrolled hypertension.  At the time of her last follow-up, her hydralazine was increased to 3 times daily dosing.  With the increased frequency of hydralazine, her systolic BP has gone from the 180s-200s to the 160s-170s with occasional readings in the 180s.  She has tolerated the increase of hydralazine without difficulty.  She continues to deny chest pain or chest discomfort.  Her only other complaint today is frequent diarrhea with urgency.  No other specific complaints.    Past Cardiac Testin. Last Coronary Angio:               1.  30% stenosis in the mid left main              2.  50% stenosis in the ostial LCx              3.  50% stenosis in the RPDA  2. Prior Stress Testing: MPS               1.  No evidence of inducible ischemia  3. Last Echo:               1.  LVEF 60%              2.  Mild concentric LVH              3.  Diastolic dysfunction  4. Prior Holter Monitor: None    Review of Systems:   Review of Systems   Constitutional: Negative for activity change, appetite change, chills, diaphoresis, fatigue, fever, unexpected weight gain and unexpected weight loss.   Eyes: Negative for blurred vision and double  vision.   Respiratory: Negative for cough, chest tightness, shortness of breath and wheezing.    Cardiovascular: Negative for chest pain, palpitations and leg swelling.   Gastrointestinal: Positive for diarrhea. Negative for abdominal pain, anal bleeding, blood in stool and GERD.   Endocrine: Negative for cold intolerance and heat intolerance.   Genitourinary: Negative for hematuria.   Neurological: Negative for dizziness, syncope, weakness and light-headedness.   Hematological: Does not bruise/bleed easily.   Psychiatric/Behavioral: Negative for depressed mood and stress. The patient is not nervous/anxious.        I have reviewed and/or updated the patient's past medical, past surgical, family, social history, problem list and allergies as appropriate.     Medications:     Current Outpatient Medications:   •  allopurinol (Zyloprim) 300 MG tablet, Take 1 tablet by mouth Daily., Disp: 30 tablet, Rfl: 3  •  atorvastatin (LIPITOR) 20 MG tablet, Take 1 tablet by mouth Daily., Disp: 90 tablet, Rfl: 1  •  carvedilol (Coreg) 25 MG tablet, Take 1 tablet by mouth 2 (Two) Times a Day With Meals., Disp: 180 tablet, Rfl: 1  •  chlorthalidone (HYGROTON) 25 MG tablet, Take 1 tablet by mouth Daily., Disp: 90 tablet, Rfl: 1  •  Cholecalciferol 125 MCG (5000 UT) tablet, Vitamin D3 125 mcg (5,000 unit) tablet  Take by oral route., Disp: , Rfl:   •  Cinnamon 500 MG capsule, Cinnamon  QD, Disp: , Rfl:   •  cyanocobalamin (VITAMIN B-12) 1000 MCG tablet, cyanocobalamin (vitamin B-12), Disp: , Rfl:   •  hydrALAZINE (APRESOLINE) 100 MG tablet, Take 1 tablet by mouth 3 (Three) Times a Day., Disp: 180 tablet, Rfl: 5  •  Iron-Vitamin C 100-250 MG tablet, iron  QD, Disp: , Rfl:   •  levocetirizine (XYZAL) 5 MG tablet, Take 1 tablet by mouth Daily., Disp: , Rfl:   •  levothyroxine (SYNTHROID, LEVOTHROID) 150 MCG tablet, Take 1 tablet by mouth Daily. (Patient taking differently: Take 175 mcg by mouth Daily.), Disp: 90 tablet, Rfl: 1  •   "Melatonin 10 MG capsule, melatonin 10 mg capsule  Take 1 capsule every day by oral route., Disp: , Rfl:   •  Omega-3 Fatty Acids (fish oil) 1000 MG capsule capsule, Take 1,000 mg by mouth Daily With Breakfast., Disp: , Rfl:   •  omeprazole (priLOSEC) 20 MG capsule, Take 1 capsule by mouth Daily., Disp: , Rfl:   •  pramipexole (MIRAPEX) 0.5 MG tablet, Take 1 tablet by mouth Every 12 (Twelve) Hours., Disp: 180 tablet, Rfl: 1  •  sertraline (ZOLOFT) 100 MG tablet, Zoloft 100 mg tablet  Take 1 tablet every day by oral route., Disp: , Rfl:   •  tiZANidine (ZANAFLEX) 4 MG tablet, tizanidine 4 mg tablet, Disp: , Rfl:   •  triamcinolone (KENALOG) 0.1 % cream, Apply 1 application topically to the appropriate area as directed 2 (Two) Times a Day., Disp: 15 g, Rfl: 5  •  TURMERIC CURCUMIN PO, Take 1,000 mg by mouth Daily., Disp: , Rfl:   •  valsartan (DIOVAN) 320 MG tablet, Take 1 tablet by mouth Daily., Disp: 90 tablet, Rfl: 1    Physical Exam:  Vital Signs:   Vitals:    11/23/22 1102   BP: 120/68   BP Location: Left arm   Patient Position: Sitting   Pulse: 76   Resp: 18   SpO2: 98%   Weight: 84.8 kg (187 lb)   Height: 152.4 cm (60\")       Physical Exam  Constitutional:       General: She is not in acute distress.     Appearance: She is well-developed. She is obese. She is not diaphoretic.   HENT:      Head: Normocephalic and atraumatic.   Eyes:      General: No scleral icterus.     Pupils: Pupils are equal, round, and reactive to light.   Neck:      Trachea: No tracheal deviation.   Cardiovascular:      Rate and Rhythm: Normal rate and regular rhythm.      Heart sounds: Normal heart sounds. No murmur heard.    No friction rub. No gallop.      Comments: Normal JVD.  Pulmonary:      Effort: Pulmonary effort is normal. No respiratory distress.      Breath sounds: Normal breath sounds. No stridor. No wheezing or rales.   Chest:      Chest wall: No tenderness.   Abdominal:      General: Bowel sounds are normal. There is no " distension.      Palpations: Abdomen is soft.      Tenderness: There is no abdominal tenderness. There is no guarding or rebound.   Musculoskeletal:         General: No swelling. Normal range of motion.      Cervical back: Neck supple. No tenderness.   Lymphadenopathy:      Cervical: No cervical adenopathy.   Skin:     General: Skin is warm and dry.      Findings: No erythema.   Neurological:      General: No focal deficit present.      Mental Status: She is alert and oriented to person, place, and time.   Psychiatric:         Behavior: Behavior normal.         Results Review:   I reviewed the patient's new clinical results.        Assessment / Plan:     1. Essential hypertension  --  Remains uncontrolled, goal is systolic less than 160 mmHg  -- Will continue carvedilol, valsartan, and chlorthalidone  --  Uptitrate hydralazine to 100 mg 3 times daily  --  History of intolerance to amlodipine  -- Renal artery duplex pending to rule out renal artery stenosis     2. Coronary artery disease  -- History of mild to moderate nonobstructive coronary artery disease on coronary angiogram in 2019  --  Remains active without chest pain or exertional anginal symptoms  -- Continue atorvastatin and aspirin     3.  Left bundle branch block  -- Chronic     4.  Pure hypercholesterolemia  -- Lipid profile in 7/21 showed LDL 69, HDL 49, triglycerides 198  --Continue statin     5. Stage 3 chronic kidney disease, unspecified whether stage 3a or 3b CKD (HCC)  -- GFR 45 on last available labs for review     6. Severe obesity (BMI 35.0-39.9)   -- Weight loss advised    7.  Chronic diarrhea with urgency  --Will place referral to gastroenterology     Follow Up:   Return in about 1 year (around 11/23/2023).      Thank you for allowing me to participate in the care of your patient. Please to not hesitate to contact me with additional questions or concerns.     UMAIR Orona MD  Interventional Cardiology   11/23/2022  11:05 EST

## 2022-12-29 ENCOUNTER — HOSPITAL ENCOUNTER (OUTPATIENT)
Dept: ULTRASOUND IMAGING | Facility: HOSPITAL | Age: 77
Discharge: HOME | End: 2022-12-29
Admitting: INTERNAL MEDICINE

## 2022-12-29 DIAGNOSIS — I10 ESSENTIAL HYPERTENSION: ICD-10-CM

## 2022-12-29 PROCEDURE — 93975 VASCULAR STUDY: CPT

## 2023-03-09 RX ORDER — PRAMIPEXOLE DIHYDROCHLORIDE 0.5 MG/1
TABLET ORAL
Qty: 180 TABLET | Refills: 3 | OUTPATIENT
Start: 2023-03-09

## 2023-03-09 RX ORDER — LEVOTHYROXINE SODIUM 0.15 MG/1
150 TABLET ORAL DAILY
Qty: 90 TABLET | Refills: 3 | OUTPATIENT
Start: 2023-03-09

## 2023-03-09 NOTE — TELEPHONE ENCOUNTER
Rx Refill Note  Requested Prescriptions     Pending Prescriptions Disp Refills   • pramipexole (MIRAPEX) 0.5 MG tablet [Pharmacy Med Name: Pramipexole Dihydrochloride 0.5 MG Oral Tablet] 180 tablet 3     Sig: TAKE 1 TABLET BY MOUTH  EVERY 12 HOURS   • levothyroxine (SYNTHROID, LEVOTHROID) 150 MCG tablet [Pharmacy Med Name: Levothyroxine Sodium 150 MCG Oral Tablet] 90 tablet 3     Sig: TAKE 1 TABLET BY MOUTH  DAILY      Last office visit with prescribing clinician: 10/19/2022   Last telemedicine visit with prescribing clinician: Visit date not found   Next office visit with prescribing clinician: Visit date not found                         Would you like a call back once the refill request has been completed: [] Yes [] No    If the office needs to give you a call back, can they leave a voicemail: [] Yes [] No    Alhaji Ramey, [facility]  03/09/23, 14:09 EST

## 2023-05-05 DIAGNOSIS — I10 BENIGN ESSENTIAL HYPERTENSION: ICD-10-CM

## 2023-05-08 RX ORDER — CARVEDILOL 25 MG/1
TABLET ORAL
Qty: 180 TABLET | Refills: 0 | Status: SHIPPED | OUTPATIENT
Start: 2023-05-08

## 2023-05-08 NOTE — TELEPHONE ENCOUNTER
Rx Refill Note  Requested Prescriptions     Pending Prescriptions Disp Refills   • carvedilol (COREG) 25 MG tablet [Pharmacy Med Name: Carvedilol 25 MG Oral Tablet] 180 tablet 3     Sig: TAKE 1 TABLET BY MOUTH  TWICE DAILY WITH MEALS      Last office visit with prescribing clinician: 10/19/2022   Last telemedicine visit with prescribing clinician: Visit date not found   Next office visit with prescribing clinician: Visit date not found

## 2023-06-18 DIAGNOSIS — I10 BENIGN ESSENTIAL HYPERTENSION: ICD-10-CM

## 2023-06-19 RX ORDER — CARVEDILOL 25 MG/1
TABLET ORAL
Qty: 180 TABLET | Refills: 3 | OUTPATIENT
Start: 2023-06-19

## 2023-06-22 PROBLEM — M62.838 MUSCLE SPASM: Status: ACTIVE | Noted: 2023-06-22

## 2023-07-24 DIAGNOSIS — I10 BENIGN ESSENTIAL HYPERTENSION: ICD-10-CM

## 2023-07-25 RX ORDER — CARVEDILOL 25 MG/1
TABLET ORAL
Qty: 180 TABLET | Refills: 3 | OUTPATIENT
Start: 2023-07-25

## 2023-07-25 NOTE — TELEPHONE ENCOUNTER
Rx Refill Note  Requested Prescriptions     Pending Prescriptions Disp Refills    carvedilol (COREG) 25 MG tablet [Pharmacy Med Name: Carvedilol 25 MG Oral Tablet] 180 tablet 3     Sig: TAKE 1 TABLET BY MOUTH  TWICE DAILY WITH MEALS      Last office visit with prescribing clinician: 10/19/2022   Last telemedicine visit with prescribing clinician: Visit date not found   Next office visit with prescribing clinician: Visit date not found                         Elisa Garrett MA  07/25/23, 07:38 EDT

## 2024-08-11 ENCOUNTER — APPOINTMENT (OUTPATIENT)
Dept: GENERAL RADIOLOGY | Facility: HOSPITAL | Age: 79
End: 2024-08-11
Payer: MEDICARE

## 2024-08-11 ENCOUNTER — APPOINTMENT (OUTPATIENT)
Dept: CT IMAGING | Facility: HOSPITAL | Age: 79
End: 2024-08-11
Payer: MEDICARE

## 2024-08-11 ENCOUNTER — HOSPITAL ENCOUNTER (INPATIENT)
Facility: HOSPITAL | Age: 79
LOS: 2 days | Discharge: HOME | End: 2024-08-13
Attending: STUDENT IN AN ORGANIZED HEALTH CARE EDUCATION/TRAINING PROGRAM | Admitting: STUDENT IN AN ORGANIZED HEALTH CARE EDUCATION/TRAINING PROGRAM
Payer: MEDICARE

## 2024-08-11 DIAGNOSIS — J96.01 ACUTE HYPOXIC RESPIRATORY FAILURE: Primary | ICD-10-CM

## 2024-08-11 DIAGNOSIS — J96.11 CHRONIC RESPIRATORY FAILURE WITH HYPOXIA: ICD-10-CM

## 2024-08-11 PROBLEM — I50.9 ACUTE EXACERBATION OF CHF (CONGESTIVE HEART FAILURE): Status: ACTIVE | Noted: 2024-08-11

## 2024-08-11 LAB
ALBUMIN SERPL-MCNC: 3.6 G/DL (ref 3.5–5.2)
ALBUMIN/GLOB SERPL: 1.2 G/DL
ALP SERPL-CCNC: 115 U/L (ref 39–117)
ALT SERPL W P-5'-P-CCNC: 32 U/L (ref 1–33)
ANION GAP SERPL CALCULATED.3IONS-SCNC: 18.9 MMOL/L (ref 5–15)
AST SERPL-CCNC: 105 U/L (ref 1–32)
BASOPHILS # BLD AUTO: 0.04 10*3/MM3 (ref 0–0.2)
BASOPHILS NFR BLD AUTO: 0.5 % (ref 0–1.5)
BILIRUB SERPL-MCNC: 0.9 MG/DL (ref 0–1.2)
BUN SERPL-MCNC: 22 MG/DL (ref 8–23)
BUN/CREAT SERPL: 17.9 (ref 7–25)
CALCIUM SPEC-SCNC: 9.2 MG/DL (ref 8.6–10.5)
CHLORIDE SERPL-SCNC: 103 MMOL/L (ref 98–107)
CO2 SERPL-SCNC: 16.1 MMOL/L (ref 22–29)
CREAT SERPL-MCNC: 1.23 MG/DL (ref 0.57–1)
CRP SERPL-MCNC: 0.3 MG/DL (ref 0–0.5)
DEPRECATED RDW RBC AUTO: 52.8 FL (ref 37–54)
EGFRCR SERPLBLD CKD-EPI 2021: 44.8 ML/MIN/1.73
EOSINOPHIL # BLD AUTO: 0.01 10*3/MM3 (ref 0–0.4)
EOSINOPHIL NFR BLD AUTO: 0.1 % (ref 0.3–6.2)
ERYTHROCYTE [DISTWIDTH] IN BLOOD BY AUTOMATED COUNT: 13.7 % (ref 12.3–15.4)
FLUAV RNA RESP QL NAA+PROBE: NOT DETECTED
FLUBV RNA RESP QL NAA+PROBE: NOT DETECTED
GEN 5 2HR TROPONIN T REFLEX: 111 NG/L
GLOBULIN UR ELPH-MCNC: 2.9 GM/DL
GLUCOSE BLDC GLUCOMTR-MCNC: 122 MG/DL (ref 70–130)
GLUCOSE SERPL-MCNC: 220 MG/DL (ref 65–99)
HBA1C MFR BLD: 4.8 % (ref 4.8–5.6)
HCT VFR BLD AUTO: 39.7 % (ref 34–46.6)
HGB BLD-MCNC: 13.1 G/DL (ref 12–15.9)
IMM GRANULOCYTES # BLD AUTO: 0.04 10*3/MM3 (ref 0–0.05)
IMM GRANULOCYTES NFR BLD AUTO: 0.5 % (ref 0–0.5)
LYMPHOCYTES # BLD AUTO: 1.88 10*3/MM3 (ref 0.7–3.1)
LYMPHOCYTES NFR BLD AUTO: 22.4 % (ref 19.6–45.3)
MAGNESIUM SERPL-MCNC: 1.9 MG/DL (ref 1.6–2.4)
MCH RBC QN AUTO: 34.3 PG (ref 26.6–33)
MCHC RBC AUTO-ENTMCNC: 33 G/DL (ref 31.5–35.7)
MCV RBC AUTO: 103.9 FL (ref 79–97)
MONOCYTES # BLD AUTO: 0.53 10*3/MM3 (ref 0.1–0.9)
MONOCYTES NFR BLD AUTO: 6.3 % (ref 5–12)
NEUTROPHILS NFR BLD AUTO: 5.9 10*3/MM3 (ref 1.7–7)
NEUTROPHILS NFR BLD AUTO: 70.2 % (ref 42.7–76)
NRBC BLD AUTO-RTO: 0 /100 WBC (ref 0–0.2)
NT-PROBNP SERPL-MCNC: ABNORMAL PG/ML (ref 0–1800)
PLATELET # BLD AUTO: 136 10*3/MM3 (ref 140–450)
PMV BLD AUTO: 11.3 FL (ref 6–12)
POTASSIUM SERPL-SCNC: 5.5 MMOL/L (ref 3.5–5.2)
PROCALCITONIN SERPL-MCNC: 0.07 NG/ML (ref 0–0.25)
PROT SERPL-MCNC: 6.5 G/DL (ref 6–8.5)
RBC # BLD AUTO: 3.82 10*6/MM3 (ref 3.77–5.28)
SARS-COV-2 RNA RESP QL NAA+PROBE: DETECTED
SODIUM SERPL-SCNC: 138 MMOL/L (ref 136–145)
TROPONIN T DELTA: 12 NG/L
TROPONIN T SERPL HS-MCNC: 99 NG/L
WBC NRBC COR # BLD AUTO: 8.4 10*3/MM3 (ref 3.4–10.8)

## 2024-08-11 PROCEDURE — 99291 CRITICAL CARE FIRST HOUR: CPT

## 2024-08-11 PROCEDURE — 71275 CT ANGIOGRAPHY CHEST: CPT

## 2024-08-11 PROCEDURE — 36415 COLL VENOUS BLD VENIPUNCTURE: CPT

## 2024-08-11 PROCEDURE — 25010000002 REMDESIVIR 100 MG/20ML SOLUTION 1 EACH VIAL: Performed by: STUDENT IN AN ORGANIZED HEALTH CARE EDUCATION/TRAINING PROGRAM

## 2024-08-11 PROCEDURE — 83880 ASSAY OF NATRIURETIC PEPTIDE: CPT | Performed by: STUDENT IN AN ORGANIZED HEALTH CARE EDUCATION/TRAINING PROGRAM

## 2024-08-11 PROCEDURE — 83036 HEMOGLOBIN GLYCOSYLATED A1C: CPT | Performed by: STUDENT IN AN ORGANIZED HEALTH CARE EDUCATION/TRAINING PROGRAM

## 2024-08-11 PROCEDURE — XW033E5 INTRODUCTION OF REMDESIVIR ANTI-INFECTIVE INTO PERIPHERAL VEIN, PERCUTANEOUS APPROACH, NEW TECHNOLOGY GROUP 5: ICD-10-PCS | Performed by: STUDENT IN AN ORGANIZED HEALTH CARE EDUCATION/TRAINING PROGRAM

## 2024-08-11 PROCEDURE — 63710000001 INSULIN REGULAR HUMAN PER 5 UNITS: Performed by: STUDENT IN AN ORGANIZED HEALTH CARE EDUCATION/TRAINING PROGRAM

## 2024-08-11 PROCEDURE — 82948 REAGENT STRIP/BLOOD GLUCOSE: CPT

## 2024-08-11 PROCEDURE — 25010000002 FUROSEMIDE PER 20 MG: Performed by: STUDENT IN AN ORGANIZED HEALTH CARE EDUCATION/TRAINING PROGRAM

## 2024-08-11 PROCEDURE — 99223 1ST HOSP IP/OBS HIGH 75: CPT | Performed by: STUDENT IN AN ORGANIZED HEALTH CARE EDUCATION/TRAINING PROGRAM

## 2024-08-11 PROCEDURE — 25810000003 SODIUM CHLORIDE 0.9 % SOLUTION 250 ML FLEX CONT: Performed by: STUDENT IN AN ORGANIZED HEALTH CARE EDUCATION/TRAINING PROGRAM

## 2024-08-11 PROCEDURE — 86140 C-REACTIVE PROTEIN: CPT | Performed by: STUDENT IN AN ORGANIZED HEALTH CARE EDUCATION/TRAINING PROGRAM

## 2024-08-11 PROCEDURE — 71045 X-RAY EXAM CHEST 1 VIEW: CPT

## 2024-08-11 PROCEDURE — 85025 COMPLETE CBC W/AUTO DIFF WBC: CPT | Performed by: STUDENT IN AN ORGANIZED HEALTH CARE EDUCATION/TRAINING PROGRAM

## 2024-08-11 PROCEDURE — 25010000002 HEPARIN (PORCINE) PER 1000 UNITS: Performed by: STUDENT IN AN ORGANIZED HEALTH CARE EDUCATION/TRAINING PROGRAM

## 2024-08-11 PROCEDURE — 93005 ELECTROCARDIOGRAM TRACING: CPT | Performed by: STUDENT IN AN ORGANIZED HEALTH CARE EDUCATION/TRAINING PROGRAM

## 2024-08-11 PROCEDURE — 84484 ASSAY OF TROPONIN QUANT: CPT | Performed by: STUDENT IN AN ORGANIZED HEALTH CARE EDUCATION/TRAINING PROGRAM

## 2024-08-11 PROCEDURE — 25010000002 DEXAMETHASONE SODIUM PHOSPHATE 10 MG/ML SOLUTION: Performed by: STUDENT IN AN ORGANIZED HEALTH CARE EDUCATION/TRAINING PROGRAM

## 2024-08-11 PROCEDURE — 87636 SARSCOV2 & INF A&B AMP PRB: CPT | Performed by: STUDENT IN AN ORGANIZED HEALTH CARE EDUCATION/TRAINING PROGRAM

## 2024-08-11 PROCEDURE — 83735 ASSAY OF MAGNESIUM: CPT | Performed by: STUDENT IN AN ORGANIZED HEALTH CARE EDUCATION/TRAINING PROGRAM

## 2024-08-11 PROCEDURE — 80053 COMPREHEN METABOLIC PANEL: CPT | Performed by: STUDENT IN AN ORGANIZED HEALTH CARE EDUCATION/TRAINING PROGRAM

## 2024-08-11 PROCEDURE — 84145 PROCALCITONIN (PCT): CPT | Performed by: STUDENT IN AN ORGANIZED HEALTH CARE EDUCATION/TRAINING PROGRAM

## 2024-08-11 PROCEDURE — 25510000001 IOPAMIDOL 61 % SOLUTION: Performed by: STUDENT IN AN ORGANIZED HEALTH CARE EDUCATION/TRAINING PROGRAM

## 2024-08-11 RX ORDER — ACETAMINOPHEN 325 MG/1
650 TABLET ORAL EVERY 4 HOURS PRN
Status: DISCONTINUED | OUTPATIENT
Start: 2024-08-11 | End: 2024-08-13 | Stop reason: HOSPADM

## 2024-08-11 RX ORDER — SODIUM CHLORIDE 9 MG/ML
40 INJECTION, SOLUTION INTRAVENOUS AS NEEDED
Status: DISCONTINUED | OUTPATIENT
Start: 2024-08-11 | End: 2024-08-13 | Stop reason: HOSPADM

## 2024-08-11 RX ORDER — SODIUM CHLORIDE 0.9 % (FLUSH) 0.9 %
10 SYRINGE (ML) INJECTION AS NEEDED
Status: DISCONTINUED | OUTPATIENT
Start: 2024-08-11 | End: 2024-08-13 | Stop reason: HOSPADM

## 2024-08-11 RX ORDER — HEPARIN SODIUM 5000 [USP'U]/ML
5000 INJECTION, SOLUTION INTRAVENOUS; SUBCUTANEOUS EVERY 12 HOURS SCHEDULED
Status: DISCONTINUED | OUTPATIENT
Start: 2024-08-11 | End: 2024-08-13 | Stop reason: HOSPADM

## 2024-08-11 RX ORDER — ACETAMINOPHEN 650 MG/1
650 SUPPOSITORY RECTAL EVERY 4 HOURS PRN
Status: DISCONTINUED | OUTPATIENT
Start: 2024-08-11 | End: 2024-08-13 | Stop reason: HOSPADM

## 2024-08-11 RX ORDER — FUROSEMIDE 10 MG/ML
80 INJECTION INTRAMUSCULAR; INTRAVENOUS ONCE
Status: COMPLETED | OUTPATIENT
Start: 2024-08-11 | End: 2024-08-11

## 2024-08-11 RX ORDER — DEXAMETHASONE SODIUM PHOSPHATE 10 MG/ML
6 INJECTION, SOLUTION INTRAMUSCULAR; INTRAVENOUS ONCE
Status: COMPLETED | OUTPATIENT
Start: 2024-08-11 | End: 2024-08-11

## 2024-08-11 RX ORDER — ACETAMINOPHEN 160 MG/5ML
650 SOLUTION ORAL EVERY 4 HOURS PRN
Status: DISCONTINUED | OUTPATIENT
Start: 2024-08-11 | End: 2024-08-13 | Stop reason: HOSPADM

## 2024-08-11 RX ORDER — ONDANSETRON 2 MG/ML
4 INJECTION INTRAMUSCULAR; INTRAVENOUS EVERY 6 HOURS PRN
Status: DISCONTINUED | OUTPATIENT
Start: 2024-08-11 | End: 2024-08-13 | Stop reason: HOSPADM

## 2024-08-11 RX ORDER — SODIUM CHLORIDE 0.9 % (FLUSH) 0.9 %
10 SYRINGE (ML) INJECTION EVERY 12 HOURS SCHEDULED
Status: DISCONTINUED | OUTPATIENT
Start: 2024-08-11 | End: 2024-08-13 | Stop reason: HOSPADM

## 2024-08-11 RX ORDER — FUROSEMIDE 10 MG/ML
40 INJECTION INTRAMUSCULAR; INTRAVENOUS
Status: DISCONTINUED | OUTPATIENT
Start: 2024-08-12 | End: 2024-08-13

## 2024-08-11 RX ORDER — NITROGLYCERIN 0.4 MG/1
0.4 TABLET SUBLINGUAL
Status: DISCONTINUED | OUTPATIENT
Start: 2024-08-11 | End: 2024-08-13 | Stop reason: HOSPADM

## 2024-08-11 RX ORDER — DEXAMETHASONE SODIUM PHOSPHATE 10 MG/ML
6 INJECTION, SOLUTION INTRAMUSCULAR; INTRAVENOUS DAILY
Status: DISCONTINUED | OUTPATIENT
Start: 2024-08-12 | End: 2024-08-13 | Stop reason: HOSPADM

## 2024-08-11 RX ORDER — DEXTROSE MONOHYDRATE 25 G/50ML
50 INJECTION, SOLUTION INTRAVENOUS ONCE
Status: COMPLETED | OUTPATIENT
Start: 2024-08-11 | End: 2024-08-11

## 2024-08-11 RX ADMIN — HEPARIN SODIUM 5000 UNITS: 5000 INJECTION INTRAVENOUS; SUBCUTANEOUS at 20:15

## 2024-08-11 RX ADMIN — IOPAMIDOL 70 ML: 612 INJECTION, SOLUTION INTRAVENOUS at 13:55

## 2024-08-11 RX ADMIN — INSULIN HUMAN 10 UNITS: 100 INJECTION, SOLUTION PARENTERAL at 17:33

## 2024-08-11 RX ADMIN — REMDESIVIR 200 MG: 100 INJECTION, POWDER, LYOPHILIZED, FOR SOLUTION INTRAVENOUS at 17:33

## 2024-08-11 RX ADMIN — DEXAMETHASONE SODIUM PHOSPHATE 6 MG: 10 INJECTION INTRAMUSCULAR; INTRAVENOUS at 13:11

## 2024-08-11 RX ADMIN — FUROSEMIDE 80 MG: 10 INJECTION, SOLUTION INTRAMUSCULAR; INTRAVENOUS at 14:29

## 2024-08-11 RX ADMIN — DEXTROSE MONOHYDRATE 50 ML: 25 INJECTION, SOLUTION INTRAVENOUS at 17:33

## 2024-08-11 RX ADMIN — Medication 10 ML: at 20:15

## 2024-08-11 NOTE — H&P
[facility]      Name:  Sallie Daily   Age:  79 y.o.  Sex:  female  :  1945  MRN:  [id number]   Visit Number:  [id number]  Admission Date:  2024  Date Of Service:  24  Primary Care Physician:  Minnie Avila DO    Chief Complaint:     Shortness of air, generalized weakness    History Of Presenting Illness:      Sallie Daily is a 79-year-old woman with past medical history of coronary artery disease, CHF, CKD, anemia, arthritis, diverticulosis, hypertension, hyperlipidemia, hypothyroidism, IBD with sacral stimulator, sleep apnea, osteoporosis.  Presented to [facility] ED on 2024 with concern for shortness of air onset night prior to presentation.  Was not able to get off the commode while having a bowel movement shortness of air.  Denied fevers or chills, abdominal pain, productive cough, N/V/D, urinary symptoms, leg pain or swelling.    ED summary: Afebrile, vital signs stable on 4 L.  EKG left bundle branch block similar to previous morphology, nonspecific ST-T wave changes.  Troponins 99, 111, ACS not suspected.  proBNP over 70,000.  Potassium 5.5.  Creatinine 1.23.  Blood glucose 220.  .  COVID-19 positive CXR cardiomegaly and mild pulmonary vascular congestion, left lung base atelectasis or pneumonia.  CT angio chest no evidence of PE, small to moderate bilateral pleural effusions and mild bibasilar atelectasis, small amount of ascites.  She was provided dexamethasone, Lasix.    Review Of Systems:    All systems were reviewed and negative except as mentioned in history of presenting illness, assessment and plan.    Past Medical History: Patient  has a past medical history of Abnormal ECG (2021), Allergic (Sulfa), Anemia, Arthritis, Cataract (Removed), Cellulitis, CHF (congestive heart failure) (), Chronic kidney disease (2002), Colon polyp, Coronary artery disease (), Diverticulitis, Diverticulosis (Several),  GERD (gastroesophageal reflux disease), Gout, Heart attack, HL (hearing loss), Hyperlipidemia, Hypertension, Hyperthyroidism (Caught early), Inflammatory bowel disease ([employer] device), Low back pain, Obesity, Osteoporosis, Pneumonia, Sleep apnea (2010), Thyroid disease, Urinary tract infection (Once), and Visual impairment (Nearsighted).    Past Surgical History: Patient  has a past surgical history that includes Rotator cuff repair; Neck surgery; Replacement total knee; Colonoscopy; Colon surgery (1.5 ft removed); Eye surgery (Cataract/lens); Hernia repair (5 times); Hysterectomy (1990 ?); Joint replacement (Knee, rotator cuff x 2); Sinus surgery (2021); Spine surgery (Upper spine); Tonsillectomy (1950 ?); Tubal ligation; and Carotid endarterectomy (2011).    Social History: Patient  reports that she quit smoking about 18 years ago. Her smoking use included cigarettes. She started smoking about 60 years ago. She has a 42.1 pack-year smoking history. She has never used smokeless tobacco. She reports that she does not currently use alcohol. She reports that she does not use drugs.    Family History:  Patient's family history has been reviewed and found to be noncontributory.     Allergies:      Sulfa antibiotics and Penicillins    Home Medications:    Prior to Admission Medications       Prescriptions Last Dose Informant Patient Reported? Taking?    carvedilol (COREG) 25 MG tablet   No No    TAKE 1 TABLET BY MOUTH  TWICE DAILY WITH MEALS    chlorthalidone (HYGROTON) 25 MG tablet   No No    Take 1 tablet by mouth Daily.    Cholecalciferol 125 MCG (5000 UT) tablet   Yes No    Vitamin D3 125 mcg (5,000 unit) tablet   Take by oral route.    Diclofenac Sodium (VOLTAREN) 1 % gel gel   No No    Apply 4 g topically to the appropriate area as directed 4 (Four) Times a Day As Needed (pain).    hydrALAZINE (APRESOLINE) 100 MG tablet   No No    Take 1 tablet by mouth 3 (Three) Times a Day.    levocetirizine (XYZAL) 5 MG  "tablet   Yes No    Take 1 tablet by mouth Daily.    levothyroxine (SYNTHROID, LEVOTHROID) 150 MCG tablet   No No    Take 1 tablet by mouth Daily.    Patient taking differently:  Take 175 mcg by mouth Daily.    loperamide (IMODIUM) 2 MG capsule   Yes No    Take 1 capsule by mouth 4 (Four) Times a Day As Needed for Diarrhea.    Melatonin 10 MG capsule   Yes No    melatonin 10 mg capsule   Take 1 capsule every day by oral route.    omeprazole (priLOSEC) 20 MG capsule   Yes No    Take 1 capsule by mouth Daily.    pramipexole (MIRAPEX) 0.5 MG tablet   No No    Take 1 tablet by mouth Every 12 (Twelve) Hours.    predniSONE (DELTASONE) 20 MG tablet   No No    3 po daily for 2 days, 2 po daily for 2 days, 1 po daily for 2 days    sertraline (ZOLOFT) 100 MG tablet   Yes No    Zoloft 100 mg tablet   Take 1 tablet every day by oral route.    tiZANidine (ZANAFLEX) 4 MG tablet   Yes No    tizanidine 4 mg tablet    valsartan (DIOVAN) 320 MG tablet   No No    Take 1 tablet by mouth Daily.          ED Medications:    Medications   dexAMETHasone sodium phosphate injection 6 mg (6 mg Intravenous Given 8/11/24 1311)   iopamidol (ISOVUE-300) 61 % injection 100 mL (70 mL Intravenous Given 8/11/24 1355)   furosemide (LASIX) injection 80 mg (80 mg Intravenous Given 8/11/24 1429)     Vital Signs:  Temp:  [97.8 °F (36.6 °C)] 97.8 °F (36.6 °C)  Heart Rate:  [67-90] 70  Resp:  [20] 20  BP: (154-163)/(75-92) 157/79        08/11/24  1148   Weight: 68.5 kg (151 lb)     Body mass index is 29.49 kg/m².    Physical Exam:     Most recent vital Signs: /79   Pulse 70   Temp 97.8 °F (36.6 °C) (Oral)   Resp 20   Ht 152.4 cm (60\")   Wt 68.5 kg (151 lb)   SpO2 99%   BMI 29.49 kg/m²     Physical Exam  Constitutional:       General: She is not in acute distress.     Appearance: She is ill-appearing. She is not toxic-appearing.   HENT:      Mouth/Throat:      Mouth: Mucous membranes are moist.   Eyes:      Extraocular Movements: Extraocular " "movements intact.   Cardiovascular:      Rate and Rhythm: Normal rate and regular rhythm.      Pulses: Normal pulses.      Heart sounds: Normal heart sounds.   Pulmonary:      Effort: Pulmonary effort is normal.      Breath sounds: Normal breath sounds.   Abdominal:      Palpations: Abdomen is soft.      Tenderness: There is no abdominal tenderness.   Musculoskeletal:      Right lower leg: No edema.      Left lower leg: No edema.   Skin:     General: Skin is warm.      Capillary Refill: Capillary refill takes less than 2 seconds.   Neurological:      General: No focal deficit present.      Mental Status: She is alert and oriented to person, place, and time.   Psychiatric:         Mood and Affect: Mood normal.         Thought Content: Thought content normal.         Laboratory data:    I have reviewed the labs done in the emergency room.    Results from last 7 days   Lab Units 08/11/24  1153   SODIUM mmol/L 138   POTASSIUM mmol/L 5.5*   CHLORIDE mmol/L 103   CO2 mmol/L 16.1*   BUN mg/dL 22   CREATININE mg/dL 1.23*   CALCIUM mg/dL 9.2   BILIRUBIN mg/dL 0.9   ALK PHOS U/L 115   ALT (SGPT) U/L 32   AST (SGOT) U/L 105*   GLUCOSE mg/dL 220*     Results from last 7 days   Lab Units 08/11/24  1153   WBC 10*3/mm3 8.40   HEMOGLOBIN g/dL 13.1   HEMATOCRIT % 39.7   PLATELETS 10*3/mm3 136*         Results from last 7 days   Lab Units 08/11/24  1404 08/11/24  1153   HSTROP T ng/L 111* 99*     Results from last 7 days   Lab Units 08/11/24  1153   PROBNP pg/mL >70,000.0*                       Invalid input(s): \"USDES\", \"NITRITITE\", \"BACT\", \"EP\"    Pain Management Panel           No data to display                EKG:      EKG left bundle branch block similar to previous morphology, nonspecific ST-T wave changes.    Radiology:    CT Angiogram Chest Pulmonary Embolism    Result Date: 8/11/2024  PROCEDURE: CT ANGIOGRAM CHEST PULMONARY EMBOLISM-  HISTORY: Hypoxic, COVID-positive, eval PE; J96.01-Acute respiratory failure with hypoxia   " COMPARISON: None  FINDINGS: Thin section axial CT images of the chest were obtained with contrast. 3D reformatted images were also obtained. This study was performed with techniques to keep radiation doses as low as reasonably achievable, (ALARA). Individualized dose reduction techniques using automated exposure control or adjustment of mA and/or kV according to the patient size were employed.  There is no evidence of pulmonary embolism. The thoracic aorta is not well opacified but there is no evidence of aneurysm or dissection. Multiple mildly enlarged mediastinal nodes are seen which are nonspecific but favor reactive. No mediastinal mass is identified. There is mild bibasilar atelectasis. There are small to moderate bilateral pleural effusions. There is no evidence of pneumothorax. No focal chest wall abnormality is identified.  Limited images of the upper abdomen reveal a small amount of ascites.        No evidence of pulmonary embolism.  Small to moderate bilateral pleural effusions and mild bibasilar atelectasis.  Small amount of ascites.     CTDI: 5.32 mGy DLP:173.42 mGy.cm   This report was signed and finalized on 8/11/2024 1:59 PM by Fer Agarwal MD.      XR Chest 1 View    Result Date: 8/11/2024  PROCEDURE: XR CHEST 1 VW-  HISTORY: SOA; J96.01-Acute respiratory failure with hypoxia   COMPARISON: None.  FINDINGS: Cardiomegaly is noted. The mediastinum is normal. There is mild pulmonary vascular congestion. Left lung base opacities are seen consistent with atelectasis or pneumonia. There is no pneumothorax. The bony thorax in intact. A soft tissue calcification is seen adjacent to the left humeral head.      Cardiomegaly and mild pulmonary vascular congestion.  Left lung base atelectasis or pneumonia.    This report was signed and finalized on 8/11/2024 12:35 PM by Fer Agarwal MD.       Assessment/Plan:    Inpatient general floor admission 8/11/2024 with acute respiratory failure with hypoxia  secondary to suspected elements of acute exacerbation of heart failure as well as COVID-19, elevated troponins ACS not suspected likely demand ischemia, hyperkalemia, hyperglycemia.    At time of admission comfortable in bed, pleasantly conversational.  No respiratory distress.  Already feeling a lot better.    Daughter updated at bedside.    Acute respiratory failure with hypoxia  Acute exacerbation of heart failure  COVID-19  CKD  -Supplemental oxygen as needed keep saturation above 90%.  -Dexamethasone, remdesivir, Lasix.  -Nephrology consultation, recommendations appreciated.  -Echocardiogram    Elevated troponins  -ACS not suspected, likely demand ischemia.    Hyperkalemia  -Temporize as needed.    Hyperglycemia  -Check A1c.    Chronic:  coronary artery disease, CHF, CKD, anemia, arthritis, diverticulosis, hypertension, hyperlipidemia, hypothyroidism, IBD with sacral stimulator, sleep apnea, osteoporosis.    Continue home medications.    Risk Assessment: Heart  DVT Prophylaxis: Heparin  Code Status: Full code, said she needs to think about it some more  Diet: Cardiac/diabetic/renal/fluid restriction    Advance Care Planning   ACP discussion was held with the patient during this visit. Patient does not have an advance directive, information provided.           Brian Joseph Kerley, DO  08/11/24  14:54 EDT    Dictated utilizing Dragon dictation.

## 2024-08-11 NOTE — PLAN OF CARE
Goal Outcome Evaluation:            New admission, oriented to room. Pt currently on 2.5L NC, tolerating well. Pt has difficulty with urination, assisted to BSC, 400cc output after straining.

## 2024-08-11 NOTE — PROGRESS NOTES
"Pharmacokinetic Consult - Remdesivir Dosing  Sallie Daily is a 79 y.o. female who has been consulted to dose remdesivir for COVID-19. Patient is 0 days from symptom onset.     Current Antimicrobial Therapy    Anti-Infectives (From admission, onward)      Ordered     Dose/Rate Route Frequency Start Stop    08/11/24 1516  remdesivir 100 mg in sodium chloride 0.9 % 270 mL IVPB (powder vial)        Ordering Provider: Kerley, Brian Joseph,    Placed in \"Followed by\" Linked Group    100 mg  over 60 Minutes Intravenous Every 24 Hours 08/12/24 1600 08/16/24 1559    08/11/24 1516  remdesivir 200 mg in sodium chloride 0.9 % 290 mL IVPB (powder vial)        Ordering Provider: Kerley, Brian Joseph,    Placed in \"Followed by\" Linked Group    200 mg  over 60 Minutes Intravenous Every 24 Hours 08/11/24 1600 08/12/24 1559            Allergies  Sulfa antibiotics and Penicillins    Relevant clinical data and objective history reviewed:  Estimated Creatinine Clearance: 32 mL/min (A) (by C-G formula based on SCr of 1.23 mg/dL (H)).    Results from last 7 days   Lab Units 08/11/24  1153   ALK PHOS U/L 115   BILIRUBIN mg/dL 0.9   ALT (SGPT) U/L 32   AST (SGOT) U/L 105*       Results from last 7 days   Lab Units 08/11/24  1153   CREATININE mg/dL 1.23*       COVID LABS:  Results From Last 14 Days   Lab Units 08/11/24  1404 08/11/24  1153   PROBNP pg/mL  --  >70,000.0*   CRP mg/dL  --  0.30   PROCALCITONIN ng/mL  --  0.07   HSTROP T ng/L 111* 99*       Asessment/Plan  Will initiate remdesivir 200mg IV x 1 dose followed by remdesivir 100mg IV x 4 doses for a total of 5 days of therapy.  Pharmacy will monitor Ms. Daily's clinical status as well as renal and hepatic function.     Thank you for the consult,     Deandre Bright, AkiraD, BCPS   8/11/2024  15:21 EDT   "

## 2024-08-11 NOTE — ED PROVIDER NOTES
Subjective:  History of Present Illness:    Patient is a 79-year-old female with history of CHF, CAD, gout, GERD, hypertension, hyperlipidemia, hypothyroidism, obesity, ANJEL who presents today with shortness of breath.  Reports acute onset dyspnea last night.  Has had persistent symptoms today and today was unable to get off the commode while having a bowel movement.  She denies any preceding fevers.  No abdominal pain.  Denies any nausea vomiting or diarrhea.  No increase in her leg swelling.  Reports no history of PE/DVT.  Denies urinary symptoms.  Does not normally wear oxygen at home.  Requiring 4 L nasal cannula here.      Nurses Notes reviewed and agree, including vitals, allergies, social history and prior medical history.     REVIEW OF SYSTEMS: All systems reviewed and not pertinent unless noted.  Review of Systems   Constitutional:  Positive for activity change and appetite change. Negative for chills, fatigue and fever.   HENT:  Negative for rhinorrhea, sinus pressure and sinus pain.    Eyes:  Negative for discharge and itching.   Respiratory:  Negative for cough and shortness of breath.    Cardiovascular:  Negative for chest pain and leg swelling.   Gastrointestinal:  Negative for abdominal distention, abdominal pain, nausea and vomiting.   Endocrine: Negative for cold intolerance and heat intolerance.   Genitourinary:  Negative for decreased urine volume, difficulty urinating, flank pain, frequency, urgency, vaginal bleeding, vaginal discharge and vaginal pain.   Musculoskeletal:  Negative for gait problem, neck pain and neck stiffness.   Skin:  Negative for color change.   Allergic/Immunologic: Negative for environmental allergies.   Neurological:  Negative for seizures, syncope, facial asymmetry and speech difficulty.   Psychiatric/Behavioral:  Negative for self-injury and suicidal ideas.        Past Medical History:   Diagnosis Date    Abnormal ECG 11/2021    Post neck surgery    Allergic Sulfa     Anemia     Arthritis     Cataract Removed    Cellulitis     CHF (congestive heart failure)     Chronic kidney disease 2002    Colon polyp     Coronary artery disease     Congestion after neck surgery led to pneumonia which led to heart “heart attack”    Diverticulitis     Diverticulosis Several    GERD (gastroesophageal reflux disease)     Gout     Heart attack     3/2021  mi    HL (hearing loss)     Hyperlipidemia     Hypertension     Hyperthyroidism Caught early    Inflammatory bowel disease [employer] device    Low back pain     Obesity     Osteoporosis     Pneumonia     Sleep apnea 2010    Thyroid disease     Urinary tract infection Once    Visual impairment Nearsighted       Allergies:    Sulfa antibiotics and Penicillins      Past Surgical History:   Procedure Laterality Date    CAROTID ENDARTERECTOMY      COLON SURGERY  1.5 ft removed    COLONOSCOPY      2022, q1yr , [facility]    EYE SURGERY  Cataract/lens    HERNIA REPAIR  5 times    HYSTERECTOMY   ?    JOINT REPLACEMENT  Knee, rotator cuff x 2    NECK SURGERY      REPLACEMENT TOTAL KNEE      ROTATOR CUFF REPAIR      SINUS SURGERY      SPINE SURGERY  Upper spine    TONSILLECTOMY   ?    TUBAL ABDOMINAL LIGATION           Social History     Socioeconomic History    Marital status:    Tobacco Use    Smoking status: Former     Current packs/day: 0.00     Average packs/day: 1 pack/day for 42.1 years (42.1 ttl pk-yrs)     Types: Cigarettes     Start date: 10/1/1963     Quit date: 2005     Years since quittin.7    Smokeless tobacco: Never   Vaping Use    Vaping status: Never Used   Substance and Sexual Activity    Alcohol use: Not Currently     Comment: Glass of wine or beer occasionally    Drug use: Never    Sexual activity: Not Currently     Partners: Male     Birth control/protection: Pill, Tubal ligation, Birth control pill, Hysterectomy         Family History   Problem Relation Age of Onset    Thyroid  "disease Mother     Osteoporosis Mother     Obesity Mother     Heart attack Mother     Arthritis Mother     Obesity Father     Heart attack Father     Cancer Father         prostate    Thyroid disease Sister     Thyroid disease Sister     Thyroid disease Daughter        Objective  Physical Exam:  /79   Pulse 70   Temp 97.8 °F (36.6 °C) (Oral)   Resp 20   Ht 152.4 cm (60\")   Wt 68.5 kg (151 lb)   SpO2 99%   BMI 29.49 kg/m²      Physical Exam  Constitutional:       General: She is in acute distress.      Appearance: Normal appearance. She is obese. She is ill-appearing. She is not toxic-appearing.   HENT:      Head: Normocephalic and atraumatic.      Nose: Nose normal. No congestion or rhinorrhea.      Mouth/Throat:      Mouth: Mucous membranes are dry.      Pharynx: Oropharynx is clear. No oropharyngeal exudate or posterior oropharyngeal erythema.   Eyes:      Extraocular Movements: Extraocular movements intact.      Conjunctiva/sclera: Conjunctivae normal.      Pupils: Pupils are equal, round, and reactive to light.   Cardiovascular:      Rate and Rhythm: Normal rate and regular rhythm.      Pulses: Normal pulses.      Heart sounds: Normal heart sounds.   Pulmonary:      Effort: Pulmonary effort is normal. Tachypnea present. No accessory muscle usage or respiratory distress.      Breath sounds: Normal breath sounds. No decreased breath sounds or wheezing.   Abdominal:      General: Abdomen is flat. Bowel sounds are normal. There is no distension.      Palpations: Abdomen is soft.      Tenderness: There is no abdominal tenderness.   Musculoskeletal:         General: No swelling or tenderness. Normal range of motion.      Cervical back: Normal range of motion and neck supple.      Right lower leg: Edema present.      Left lower leg: Edema present.      Comments: Nonpitting edema bilateral   Skin:     General: Skin is warm and dry.      Capillary Refill: Capillary refill takes less than 2 seconds. "   Neurological:      General: No focal deficit present.      Mental Status: She is alert and oriented to person, place, and time. Mental status is at baseline.      Cranial Nerves: No cranial nerve deficit.      Sensory: No sensory deficit.      Motor: No weakness.      Coordination: Coordination normal.   Psychiatric:         Mood and Affect: Mood normal.         Behavior: Behavior normal.         Thought Content: Thought content normal.         Judgment: Judgment normal.         Critical Care    Performed by: Bucky Hardy MD  Authorized by: Bucky Hardy MD    Critical care provider statement:     Critical care time (minutes):  30    Critical care was necessary to treat or prevent imminent or life-threatening deterioration of the following conditions:  Respiratory failure    Critical care was time spent personally by me on the following activities:  Blood draw for specimens, development of treatment plan with patient or surrogate, discussions with primary provider, evaluation of patient's response to treatment, examination of patient, obtaining history from patient or surrogate, ordering and performing treatments and interventions, ordering and review of laboratory studies, ordering and review of radiographic studies, pulse oximetry, re-evaluation of patient's condition and review of old charts    Care discussed with: admitting provider        ED Course:    ED Course as of 08/11/24 1505   Sun Aug 11, 2024   1146 EKG interpreted by me, normal sinus rhythm with left bundle branch block, left axis deviation, no findings concerning for Sgarbossa criteria, rate of 89, abnormal EKG [JE]      ED Course User Index  [JE] Bucky Hardy MD       Lab Results (last 24 hours)       Procedure Component Value Units Date/Time    CBC & Differential [[id number]]  (Abnormal) Collected: 08/11/24 1153    Specimen: Blood Updated: 08/11/24 1201    Narrative:      The following orders were created for panel order CBC  & Differential.  Procedure                               Abnormality         Status                     ---------                               -----------         ------                     CBC Auto Differential[[id number]]        Abnormal            Final result                 Please view results for these tests on the individual orders.    Comprehensive Metabolic Panel [[id number]]  (Abnormal) Collected: 08/11/24 1153    Specimen: Blood Updated: 08/11/24 1234     Glucose 220 mg/dL      Comment: Glucose >180, Hemoglobin A1C recommended.        BUN 22 mg/dL      Creatinine 1.23 mg/dL      Sodium 138 mmol/L      Potassium 5.5 mmol/L      Comment: Slight hemolysis detected by analyzer. Result may be falsely elevated.        Chloride 103 mmol/L      CO2 16.1 mmol/L      Calcium 9.2 mg/dL      Total Protein 6.5 g/dL      Albumin 3.6 g/dL      ALT (SGPT) 32 U/L      AST (SGOT) 105 U/L      Comment: Slight hemolysis detected by analyzer. Result may be falsely elevated.        Alkaline Phosphatase 115 U/L      Total Bilirubin 0.9 mg/dL      Globulin 2.9 gm/dL      A/G Ratio 1.2 g/dL      BUN/Creatinine Ratio 17.9     Anion Gap 18.9 mmol/L      eGFR 44.8 mL/min/1.73     Narrative:      GFR Normal >60  Chronic Kidney Disease <60  Kidney Failure <15    The GFR formula is only valid for adults with stable renal function between ages 18 and 70.    High Sensitivity Troponin T [[id number]]  (Abnormal) Collected: 08/11/24 1153    Specimen: Blood Updated: 08/11/24 1313     HS Troponin T 99 ng/L     Narrative:      High Sensitive Troponin T Reference Range:  <14.0 ng/L- Negative Female for AMI  <22.0 ng/L- Negative Male for AMI  >=14 - Abnormal Female indicating possible myocardial injury.  >=22 - Abnormal Male indicating possible myocardial injury.   Clinicians would have to utilize clinical acumen, EKG, Troponin, and serial changes to determine if it is an Acute Myocardial Infarction or myocardial injury due to an underlying  "chronic condition.         BNP [[id number]]  (Abnormal) Collected: 08/11/24 1153    Specimen: Blood Updated: 08/11/24 1316     proBNP >70,000.0 pg/mL     Narrative:      This assay is used as an aid in the diagnosis of individuals suspected of having heart failure. It can be used as an aid in the diagnosis of acute decompensated heart failure (ADHF) in patients presenting with signs and symptoms of ADHF to the emergency department (ED). In addition, NT-proBNP of <300 pg/mL indicates ADHF is not likely.    Age Range Result Interpretation  NT-proBNP Concentration (pg/mL:      <50             Positive            >450                   Gray                 300-450                    Negative             <300    50-75           Positive            >900                  Gray                300-900                  Negative            <300      >75             Positive            >1800                  Gray                300-1800                  Negative            <300    C-reactive Protein [[id number]]  (Normal) Collected: 08/11/24 1153    Specimen: Blood Updated: 08/11/24 1228     C-Reactive Protein 0.30 mg/dL     Procalcitonin [[id number]]  (Normal) Collected: 08/11/24 1153    Specimen: Blood Updated: 08/11/24 1241     Procalcitonin 0.07 ng/mL     Narrative:      As a Marker for Sepsis (Non-Neonates):    1. <0.5 ng/mL represents a low risk of severe sepsis and/or septic shock.  2. >2 ng/mL represents a high risk of severe sepsis and/or septic shock.    As a Marker for Lower Respiratory Tract Infections that require antibiotic therapy:    PCT on Admission    Antibiotic Therapy       6-12 Hrs later    >0.5                Strongly Recommended  >0.25 - <0.5        Recommended   0.1 - 0.25          Discouraged              Remeasure/reassess PCT  <0.1                Strongly Discouraged     Remeasure/reassess PCT    As 28 day mortality risk marker: \"Change in Procalcitonin Result\" (>80% or <=80%) if Day 0 (or Day 1) and Day " 4 values are available. Refer to http://www.[facility]-pct-calculator.com    Change in PCT <=80%  A decrease of PCT levels below or equal to 80% defines a positive change in PCT test result representing a higher risk for 28-day all-cause mortality of patients diagnosed with severe sepsis for septic shock.    Change in PCT >80%  A decrease of PCT levels of more than 80% defines a negative change in PCT result representing a lower risk for 28-day all-cause mortality of patients diagnosed with severe sepsis or septic shock.       Magnesium [[id number]]  (Normal) Collected: 08/11/24 1153    Specimen: Blood Updated: 08/11/24 1233     Magnesium 1.9 mg/dL     CBC Auto Differential [[id number]]  (Abnormal) Collected: 08/11/24 1153    Specimen: Blood Updated: 08/11/24 1201     WBC 8.40 10*3/mm3      RBC 3.82 10*6/mm3      Hemoglobin 13.1 g/dL      Hematocrit 39.7 %      .9 fL      MCH 34.3 pg      MCHC 33.0 g/dL      RDW 13.7 %      RDW-SD 52.8 fl      MPV 11.3 fL      Platelets 136 10*3/mm3      Neutrophil % 70.2 %      Lymphocyte % 22.4 %      Monocyte % 6.3 %      Eosinophil % 0.1 %      Basophil % 0.5 %      Immature Grans % 0.5 %      Neutrophils, Absolute 5.90 10*3/mm3      Lymphocytes, Absolute 1.88 10*3/mm3      Monocytes, Absolute 0.53 10*3/mm3      Eosinophils, Absolute 0.01 10*3/mm3      Basophils, Absolute 0.04 10*3/mm3      Immature Grans, Absolute 0.04 10*3/mm3      nRBC 0.0 /100 WBC     COVID-19 and FLU A/B PCR, 1 HR TAT - Swab, Nasopharynx [[id number]]  (Abnormal) Collected: 08/11/24 1159    Specimen: Swab from Nasopharynx Updated: 08/11/24 1237     COVID19 Detected     Influenza A PCR Not Detected     Influenza B PCR Not Detected    Narrative:      Fact sheet for providers: https://www.fda.gov/media/399449/download    Fact sheet for patients: https://www.fda.gov/media/924253/download    Test performed by PCR.  Influenza A and Influenza B negative results should be considered presumptive in samples that have  a positive SARS-CoV-2 result.    Competitive inhibition studies showed that SARS-CoV-2 virus, when present at concentrations above 3.6E+04 copies/mL, can inhibit the detection and amplification of influenza A and influenza B virus RNA if present at or below 1.8E+02 copies/mL or 4.9E+02 copies/mL, respectively, and may lead to false negative influenza virus results. If co-infection with influenza A or influenza B virus is suspected in samples with a positive SARS-CoV-2 result, the sample should be re-tested with another FDA cleared, approved, or authorized influenza test, if influenza virus detection would change clinical management.    High Sensitivity Troponin T 2Hr [[id number]]  (Abnormal) Collected: 08/11/24 1404    Specimen: Blood Updated: 08/11/24 1443     HS Troponin T 111 ng/L      Troponin T Delta 12 ng/L     Narrative:      High Sensitive Troponin T Reference Range:  <14.0 ng/L- Negative Female for AMI  <22.0 ng/L- Negative Male for AMI  >=14 - Abnormal Female indicating possible myocardial injury.  >=22 - Abnormal Male indicating possible myocardial injury.   Clinicians would have to utilize clinical acumen, EKG, Troponin, and serial changes to determine if it is an Acute Myocardial Infarction or myocardial injury due to an underlying chronic condition.                  CT Angiogram Chest Pulmonary Embolism    Result Date: 8/11/2024  PROCEDURE: CT ANGIOGRAM CHEST PULMONARY EMBOLISM-  HISTORY: Hypoxic, COVID-positive, eval PE; J96.01-Acute respiratory failure with hypoxia   COMPARISON: None  FINDINGS: Thin section axial CT images of the chest were obtained with contrast. 3D reformatted images were also obtained. This study was performed with techniques to keep radiation doses as low as reasonably achievable, (ALARA). Individualized dose reduction techniques using automated exposure control or adjustment of mA and/or kV according to the patient size were employed.  There is no evidence of pulmonary embolism.  The thoracic aorta is not well opacified but there is no evidence of aneurysm or dissection. Multiple mildly enlarged mediastinal nodes are seen which are nonspecific but favor reactive. No mediastinal mass is identified. There is mild bibasilar atelectasis. There are small to moderate bilateral pleural effusions. There is no evidence of pneumothorax. No focal chest wall abnormality is identified.  Limited images of the upper abdomen reveal a small amount of ascites.       Impression:  No evidence of pulmonary embolism.  Small to moderate bilateral pleural effusions and mild bibasilar atelectasis.  Small amount of ascites.     CTDI: 5.32 mGy DLP:173.42 mGy.cm   This report was signed and finalized on 8/11/2024 1:59 PM by Fer Agarwal MD.      XR Chest 1 View    Result Date: 8/11/2024  PROCEDURE: XR CHEST 1 VW-  HISTORY: SOA; J96.01-Acute respiratory failure with hypoxia   COMPARISON: None.  FINDINGS: Cardiomegaly is noted. The mediastinum is normal. There is mild pulmonary vascular congestion. Left lung base opacities are seen consistent with atelectasis or pneumonia. There is no pneumothorax. The bony thorax in intact. A soft tissue calcification is seen adjacent to the left humeral head.      Impression: Cardiomegaly and mild pulmonary vascular congestion.  Left lung base atelectasis or pneumonia.    This report was signed and finalized on 8/11/2024 12:35 PM by Fer Agarwal MD.          MDM      Initial impression of presenting illness: Shortness of breath    DDX: includes but is not limited to: Bacterial pneumonia, viral URI, CHF exacerbation, PE, ACS, MI    Patient arrives guarded with vitals interpreted by myself.     Pertinent features from physical exam: Requiring 4 L nasal cannula on arrival, clear to auscultation, regular rate and rhythm with no murmur, nontender to abdominal palpation.    Initial diagnostic plan: CBC, CMP, troponin, BNP, EKG, chest x-ray, CRP, Pro-Raad, magnesium    Results from  initial plan were reviewed and interpreted by me revealing patient COVID-positive, no concern for pneumonia, CT PE ordered given this which shows no concern for PE but has bilateral pleural effusions concerning for CHF exacerbation, BNP undetectably high    Diagnostic information from other sources: Discussed with patient's  at bedside reviewed past medical records, discussed with EMS on arrival    Interventions / Re-evaluation: Given my concerns for COVID-19 with acute epoxy respiratory failure given 6 mg Decadron.  Given my concern for CHF exacerbation given 80 mg of Lasix with positive response.    Results/clinical rationale were discussed with patient  at bedside    Consultations/Discussion of results with other physicians: Given my concern for acute hypoxic respiratory failure secondary to CHF suspected to be greater contribution than COVID-19, discussed with hospitalist, Dr. Kerley, and admitted to his service for further management    Disposition plan: Admit  -----        Final diagnoses:   Acute hypoxic respiratory failure          Bucky Hardy MD  08/11/24 [id number]

## 2024-08-12 ENCOUNTER — APPOINTMENT (OUTPATIENT)
Dept: CARDIOLOGY | Facility: HOSPITAL | Age: 79
End: 2024-08-12
Payer: MEDICARE

## 2024-08-12 LAB
ALBUMIN SERPL-MCNC: 3.4 G/DL (ref 3.5–5.2)
ALBUMIN/GLOB SERPL: 1.4 G/DL
ALP SERPL-CCNC: 87 U/L (ref 39–117)
ALT SERPL W P-5'-P-CCNC: 27 U/L (ref 1–33)
ANION GAP SERPL CALCULATED.3IONS-SCNC: 12.9 MMOL/L (ref 5–15)
AST SERPL-CCNC: 64 U/L (ref 1–32)
BASOPHILS # BLD AUTO: 0.01 10*3/MM3 (ref 0–0.2)
BASOPHILS NFR BLD AUTO: 0.2 % (ref 0–1.5)
BH CV ECHO MEAS - AO MAX PG: 12.1 MMHG
BH CV ECHO MEAS - AO MEAN PG: 6.5 MMHG
BH CV ECHO MEAS - AO ROOT DIAM: 2.7 CM
BH CV ECHO MEAS - AO V2 MAX: 173.5 CM/SEC
BH CV ECHO MEAS - AO V2 VTI: 33.6 CM
BH CV ECHO MEAS - AVA(I,D): 1 CM2
BH CV ECHO MEAS - EDV(CUBED): 171.9 ML
BH CV ECHO MEAS - EDV(MOD-SP4): 184 ML
BH CV ECHO MEAS - EF(MOD-SP4): 14.7 %
BH CV ECHO MEAS - ESV(CUBED): 144.7 ML
BH CV ECHO MEAS - ESV(MOD-SP4): 157 ML
BH CV ECHO MEAS - FS: 5.6 %
BH CV ECHO MEAS - IVS/LVPW: 0.85 CM
BH CV ECHO MEAS - IVSD: 1.03 CM
BH CV ECHO MEAS - LA DIMENSION: 5.1 CM
BH CV ECHO MEAS - LAT PEAK E' VEL: 9.5 CM/SEC
BH CV ECHO MEAS - LV DIASTOLIC VOL/BSA (35-75): 109.9 CM2
BH CV ECHO MEAS - LV MASS(C)D: 252.4 GRAMS
BH CV ECHO MEAS - LV MAX PG: 1.44 MMHG
BH CV ECHO MEAS - LV MEAN PG: 1 MMHG
BH CV ECHO MEAS - LV SYSTOLIC VOL/BSA (12-30): 93.7 CM2
BH CV ECHO MEAS - LV V1 MAX: 59.9 CM/SEC
BH CV ECHO MEAS - LV V1 VTI: 11.6 CM
BH CV ECHO MEAS - LVIDD: 5.6 CM
BH CV ECHO MEAS - LVIDS: 5.3 CM
BH CV ECHO MEAS - LVOT AREA: 2.9 CM2
BH CV ECHO MEAS - LVOT DIAM: 1.92 CM
BH CV ECHO MEAS - LVPWD: 1.21 CM
BH CV ECHO MEAS - MED PEAK E' VEL: 5.7 CM/SEC
BH CV ECHO MEAS - MV A MAX VEL: 119 CM/SEC
BH CV ECHO MEAS - MV DEC TIME: 0.14 SEC
BH CV ECHO MEAS - MV E MAX VEL: 89.7 CM/SEC
BH CV ECHO MEAS - MV E/A: 0.75
BH CV ECHO MEAS - MV MAX PG: 9.6 MMHG
BH CV ECHO MEAS - MV MEAN PG: 5 MMHG
BH CV ECHO MEAS - MV V2 VTI: 28 CM
BH CV ECHO MEAS - MVA(VTI): 1.2 CM2
BH CV ECHO MEAS - PA V2 MAX: 84 CM/SEC
BH CV ECHO MEAS - RAP SYSTOLE: 8 MMHG
BH CV ECHO MEAS - RVSP: 58.4 MMHG
BH CV ECHO MEAS - SV(LVOT): 33.6 ML
BH CV ECHO MEAS - SV(MOD-SP4): 27 ML
BH CV ECHO MEAS - SVI(LVOT): 20.1 ML/M2
BH CV ECHO MEAS - SVI(MOD-SP4): 16.1 ML/M2
BH CV ECHO MEAS - TAPSE (>1.6): 2.21 CM
BH CV ECHO MEAS - TR MAX PG: 50.4 MMHG
BH CV ECHO MEAS - TR MAX VEL: 355 CM/SEC
BH CV ECHO MEASUREMENTS AVERAGE E/E' RATIO: 11.8
BH CV XLRA - TDI S': 11.2 CM/SEC
BILIRUB SERPL-MCNC: 0.3 MG/DL (ref 0–1.2)
BUN SERPL-MCNC: 35 MG/DL (ref 8–23)
BUN/CREAT SERPL: 28.7 (ref 7–25)
CALCIUM SPEC-SCNC: 9 MG/DL (ref 8.6–10.5)
CHLORIDE SERPL-SCNC: 106 MMOL/L (ref 98–107)
CO2 SERPL-SCNC: 21.1 MMOL/L (ref 22–29)
CREAT SERPL-MCNC: 1.22 MG/DL (ref 0.57–1)
DEPRECATED RDW RBC AUTO: 48 FL (ref 37–54)
EGFRCR SERPLBLD CKD-EPI 2021: 45.2 ML/MIN/1.73
EOSINOPHIL # BLD AUTO: 0 10*3/MM3 (ref 0–0.4)
EOSINOPHIL NFR BLD AUTO: 0 % (ref 0.3–6.2)
ERYTHROCYTE [DISTWIDTH] IN BLOOD BY AUTOMATED COUNT: 13.3 % (ref 12.3–15.4)
GLOBULIN UR ELPH-MCNC: 2.5 GM/DL
GLUCOSE SERPL-MCNC: 97 MG/DL (ref 65–99)
HCT VFR BLD AUTO: 31.5 % (ref 34–46.6)
HGB BLD-MCNC: 11 G/DL (ref 12–15.9)
IMM GRANULOCYTES # BLD AUTO: 0.02 10*3/MM3 (ref 0–0.05)
IMM GRANULOCYTES NFR BLD AUTO: 0.4 % (ref 0–0.5)
LYMPHOCYTES # BLD AUTO: 1.12 10*3/MM3 (ref 0.7–3.1)
LYMPHOCYTES NFR BLD AUTO: 21.3 % (ref 19.6–45.3)
MAXIMAL PREDICTED HEART RATE: 141
MCH RBC QN AUTO: 34.5 PG (ref 26.6–33)
MCHC RBC AUTO-ENTMCNC: 34.9 G/DL (ref 31.5–35.7)
MCV RBC AUTO: 98.7 FL (ref 79–97)
MONOCYTES # BLD AUTO: 0.57 10*3/MM3 (ref 0.1–0.9)
MONOCYTES NFR BLD AUTO: 10.9 % (ref 5–12)
NEUTROPHILS NFR BLD AUTO: 3.53 10*3/MM3 (ref 1.7–7)
NEUTROPHILS NFR BLD AUTO: 67.2 % (ref 42.7–76)
NRBC BLD AUTO-RTO: 0 /100 WBC (ref 0–0.2)
PLATELET # BLD AUTO: 103 10*3/MM3 (ref 140–450)
PMV BLD AUTO: 11.8 FL (ref 6–12)
POTASSIUM SERPL-SCNC: 4.4 MMOL/L (ref 3.5–5.2)
PROT SERPL-MCNC: 5.9 G/DL (ref 6–8.5)
RBC # BLD AUTO: 3.19 10*6/MM3 (ref 3.77–5.28)
SODIUM SERPL-SCNC: 140 MMOL/L (ref 136–145)
STRESS TARGET HR: 120
WBC NRBC COR # BLD AUTO: 5.25 10*3/MM3 (ref 3.4–10.8)

## 2024-08-12 PROCEDURE — 63710000001 DEXAMETHASONE PER 0.25 MG: Performed by: STUDENT IN AN ORGANIZED HEALTH CARE EDUCATION/TRAINING PROGRAM

## 2024-08-12 PROCEDURE — 99232 SBSQ HOSP IP/OBS MODERATE 35: CPT | Performed by: STUDENT IN AN ORGANIZED HEALTH CARE EDUCATION/TRAINING PROGRAM

## 2024-08-12 PROCEDURE — 80053 COMPREHEN METABOLIC PANEL: CPT | Performed by: STUDENT IN AN ORGANIZED HEALTH CARE EDUCATION/TRAINING PROGRAM

## 2024-08-12 PROCEDURE — 25010000002 REMDESIVIR 100 MG/20ML SOLUTION 1 EACH VIAL: Performed by: STUDENT IN AN ORGANIZED HEALTH CARE EDUCATION/TRAINING PROGRAM

## 2024-08-12 PROCEDURE — 25010000002 SULFUR HEXAFLUORIDE MICROSPH 60.7-25 MG RECONSTITUTED SUSPENSION: Performed by: STUDENT IN AN ORGANIZED HEALTH CARE EDUCATION/TRAINING PROGRAM

## 2024-08-12 PROCEDURE — 93306 TTE W/DOPPLER COMPLETE: CPT | Performed by: INTERNAL MEDICINE

## 2024-08-12 PROCEDURE — 93306 TTE W/DOPPLER COMPLETE: CPT

## 2024-08-12 PROCEDURE — 99222 1ST HOSP IP/OBS MODERATE 55: CPT | Performed by: INTERNAL MEDICINE

## 2024-08-12 PROCEDURE — 25810000003 SODIUM CHLORIDE 0.9 % SOLUTION 250 ML FLEX CONT: Performed by: STUDENT IN AN ORGANIZED HEALTH CARE EDUCATION/TRAINING PROGRAM

## 2024-08-12 PROCEDURE — 25010000002 FUROSEMIDE PER 20 MG: Performed by: STUDENT IN AN ORGANIZED HEALTH CARE EDUCATION/TRAINING PROGRAM

## 2024-08-12 PROCEDURE — 85025 COMPLETE CBC W/AUTO DIFF WBC: CPT | Performed by: STUDENT IN AN ORGANIZED HEALTH CARE EDUCATION/TRAINING PROGRAM

## 2024-08-12 PROCEDURE — 25010000002 HEPARIN (PORCINE) PER 1000 UNITS: Performed by: STUDENT IN AN ORGANIZED HEALTH CARE EDUCATION/TRAINING PROGRAM

## 2024-08-12 PROCEDURE — 97165 OT EVAL LOW COMPLEX 30 MIN: CPT

## 2024-08-12 RX ORDER — CARVEDILOL 12.5 MG/1
12.5 TABLET ORAL 2 TIMES DAILY WITH MEALS
Status: DISCONTINUED | OUTPATIENT
Start: 2024-08-12 | End: 2024-08-12

## 2024-08-12 RX ORDER — LOPERAMIDE HYDROCHLORIDE 2 MG/1
2 CAPSULE ORAL 2 TIMES DAILY
Status: DISCONTINUED | OUTPATIENT
Start: 2024-08-12 | End: 2024-08-13 | Stop reason: HOSPADM

## 2024-08-12 RX ORDER — DIGOXIN 125 MCG
125 TABLET ORAL
Status: DISCONTINUED | OUTPATIENT
Start: 2024-08-12 | End: 2024-08-13 | Stop reason: HOSPADM

## 2024-08-12 RX ORDER — HYDRALAZINE HYDROCHLORIDE 25 MG/1
25 TABLET, FILM COATED ORAL 3 TIMES DAILY
Status: DISCONTINUED | OUTPATIENT
Start: 2024-08-12 | End: 2024-08-12

## 2024-08-12 RX ORDER — SPIRONOLACTONE 25 MG/1
12.5 TABLET ORAL DAILY
Status: DISCONTINUED | OUTPATIENT
Start: 2024-08-12 | End: 2024-08-13 | Stop reason: HOSPADM

## 2024-08-12 RX ORDER — CARVEDILOL 25 MG/1
25 TABLET ORAL 2 TIMES DAILY WITH MEALS
Status: DISCONTINUED | OUTPATIENT
Start: 2024-08-12 | End: 2024-08-13 | Stop reason: HOSPADM

## 2024-08-12 RX ADMIN — SULFUR HEXAFLUORIDE 1 ML: KIT at 11:15

## 2024-08-12 RX ADMIN — DIGOXIN 125 MCG: 125 TABLET ORAL at 16:41

## 2024-08-12 RX ADMIN — Medication 10 ML: at 20:21

## 2024-08-12 RX ADMIN — HYDRALAZINE HYDROCHLORIDE 25 MG: 25 TABLET ORAL at 09:17

## 2024-08-12 RX ADMIN — FUROSEMIDE 40 MG: 10 INJECTION, SOLUTION INTRAMUSCULAR; INTRAVENOUS at 18:28

## 2024-08-12 RX ADMIN — Medication 10 ML: at 09:17

## 2024-08-12 RX ADMIN — SPIRONOLACTONE 12.5 MG: 25 TABLET, FILM COATED ORAL at 16:41

## 2024-08-12 RX ADMIN — CARVEDILOL 12.5 MG: 12.5 TABLET, FILM COATED ORAL at 09:17

## 2024-08-12 RX ADMIN — SACUBITRIL AND VALSARTAN 1 TABLET: 24; 26 TABLET, FILM COATED ORAL at 20:20

## 2024-08-12 RX ADMIN — CARVEDILOL 25 MG: 25 TABLET, FILM COATED ORAL at 18:28

## 2024-08-12 RX ADMIN — ACETAMINOPHEN 650 MG: 325 TABLET, FILM COATED ORAL at 10:20

## 2024-08-12 RX ADMIN — REMDESIVIR 100 MG: 100 INJECTION, POWDER, LYOPHILIZED, FOR SOLUTION INTRAVENOUS at 18:28

## 2024-08-12 RX ADMIN — LOPERAMIDE HYDROCHLORIDE 2 MG: 2 CAPSULE ORAL at 20:20

## 2024-08-12 RX ADMIN — LOPERAMIDE HYDROCHLORIDE 2 MG: 2 CAPSULE ORAL at 09:38

## 2024-08-12 RX ADMIN — FUROSEMIDE 40 MG: 10 INJECTION, SOLUTION INTRAMUSCULAR; INTRAVENOUS at 09:16

## 2024-08-12 RX ADMIN — HEPARIN SODIUM 5000 UNITS: 5000 INJECTION INTRAVENOUS; SUBCUTANEOUS at 20:20

## 2024-08-12 RX ADMIN — DEXAMETHASONE 6 MG: 4 TABLET ORAL at 09:18

## 2024-08-12 RX ADMIN — HEPARIN SODIUM 5000 UNITS: 5000 INJECTION INTRAVENOUS; SUBCUTANEOUS at 09:16

## 2024-08-12 NOTE — CONSULTS
[facility] Consult Note    Referring Provider: Kerley  Reason for Consultation: Abnormal echo    Patient Care Team:  Minnie Avila DO as PCP - General (Family Medicine)    Chief complaint : Shortness of breath    Subjective:    History of present illness: This is a 79-year-old female patient who presented to the emergency room with shortness of breath.  She was identified as having COVID-19 associated pneumonia.  The patient's chest imaging showed bilateral diffuse interstitial infiltrates consistent with viral pneumonia and/or pulmonary edema.  There were small bilateral pleural effusions and a small amount of ascites.  The patient has known congestive heart failure with LV systolic dysfunction.  Her left ventricular ejection fraction in May 2020 was 35%.  At that time the patient suffered a periprocedure myocardial infarction during cervical spine surgery.  She underwent coronary angiography at that time demonstrating a 30% mid left main stenosis, minor luminal irregularities in the LAD, a 50% ostial circumflex stenosis, 30% distal RCA stenosis and 50% stenosis in the proximal PDA.  She underwent no coronary revascularization at that time.  The patient's proBNP here was greater than 70,000.  Repeat echocardiogram shows her left ventricular ejection fraction is now 15-20%.  There is mild mitral regurgitation.  The patient has been tolerating guideline directed medical therapy well prior to presentation.  She has a regular established cardiologist in [place] and sees Dr. Orona locally.  She reports that her respiratory status has returned to her usual baseline dyspnea after parenteral diuretic therapy.    Review of Systems   Review of Systems   Constitutional: Negative for chills, diaphoresis, fever, malaise/fatigue, weight gain and weight loss.   HENT:  Negative for ear discharge, hearing loss, hoarse voice and nosebleeds.    Eyes:  Negative for discharge, double vision, pain and photophobia.    Cardiovascular:  Positive for dyspnea on exertion and orthopnea. Negative for chest pain, claudication, cyanosis, irregular heartbeat, leg swelling, near-syncope, palpitations, paroxysmal nocturnal dyspnea and syncope.   Respiratory:  Positive for shortness of breath. Negative for cough, hemoptysis, sputum production and wheezing.    Endocrine: Negative for cold intolerance, heat intolerance, polydipsia, polyphagia and polyuria.   Hematologic/Lymphatic: Negative for adenopathy and bleeding problem. Does not bruise/bleed easily.   Skin:  Negative for color change, flushing, itching and rash.   Musculoskeletal:  Negative for muscle cramps, muscle weakness, myalgias and stiffness.   Gastrointestinal:  Negative for abdominal pain, diarrhea, hematemesis, hematochezia, nausea and vomiting.   Genitourinary:  Negative for dysuria, frequency and nocturia.   Neurological:  Negative for focal weakness, loss of balance, numbness, paresthesias and seizures.   Psychiatric/Behavioral:  Negative for altered mental status, hallucinations and suicidal ideas.    Allergic/Immunologic: Negative for HIV exposure, hives and persistent infections.       History  Past Medical History:   Diagnosis Date    Abnormal ECG 11/2021    Post neck surgery    Allergic Sulfa    Anemia     Arthritis     Cataract Removed    Cellulitis     CHF (congestive heart failure) 11/21    Chronic kidney disease 1/2002    Colon polyp     Coronary artery disease 2021    Congestion after neck surgery led to pneumonia which led to heart “heart attack”    Diverticulitis     Diverticulosis Several    GERD (gastroesophageal reflux disease)     Gout     Heart attack     3/2021  mi    HL (hearing loss)     Hyperlipidemia     Hypertension     Hyperthyroidism Caught early    Inflammatory bowel disease [employer] device    Low back pain     Obesity     Osteoporosis     Pneumonia     Sleep apnea 2010    Thyroid disease     Urinary tract infection Once    Visual impairment  Nearsighted   ,   Past Surgical History:   Procedure Laterality Date    CAROTID ENDARTERECTOMY      COLON SURGERY  1.5 ft removed    COLONOSCOPY      2022, q1yr , [facility]    EYE SURGERY  Cataract/lens    HERNIA REPAIR  5 times    HYSTERECTOMY   ?    JOINT REPLACEMENT  Knee, rotator cuff x 2    NECK SURGERY      REPLACEMENT TOTAL KNEE      ROTATOR CUFF REPAIR      SINUS SURGERY      SPINE SURGERY  Upper spine    TONSILLECTOMY  1950 ?    TUBAL ABDOMINAL LIGATION     ,   Family History   Problem Relation Age of Onset    Thyroid disease Mother     Osteoporosis Mother     Obesity Mother     Heart attack Mother     Arthritis Mother     Obesity Father     Heart attack Father     Cancer Father         prostate    Thyroid disease Sister     Thyroid disease Sister     Thyroid disease Daughter    ,   Social History     Tobacco Use    Smoking status: Former     Current packs/day: 0.00     Average packs/day: 1 pack/day for 42.1 years (42.1 ttl pk-yrs)     Types: Cigarettes     Start date: 10/1/1963     Quit date: 2005     Years since quittin.7    Smokeless tobacco: Never   Vaping Use    Vaping status: Never Used   Substance Use Topics    Alcohol use: Not Currently     Comment: Glass of wine or beer occasionally    Drug use: Never   ,   Medications Prior to Admission   Medication Sig Dispense Refill Last Dose    carvedilol (COREG) 25 MG tablet TAKE 1 TABLET BY MOUTH  TWICE DAILY WITH MEALS 180 tablet 0 8/10/2024    chlorthalidone (HYGROTON) 25 MG tablet Take 1 tablet by mouth Daily. 90 tablet 1 8/10/2024    Cholecalciferol 125 MCG (5000 UT) tablet Vitamin D3 125 mcg (5,000 unit) tablet   Take by oral route.   8/10/2024    hydrALAZINE (APRESOLINE) 100 MG tablet Take 1 tablet by mouth 3 (Three) Times a Day. 180 tablet 5 8/10/2024    levocetirizine (XYZAL) 5 MG tablet Take 1 tablet by mouth Daily.   8/10/2024    levothyroxine (SYNTHROID, LEVOTHROID) 150 MCG tablet Take 1 tablet by mouth Daily.  "(Patient taking differently: Take 175 mcg by mouth Daily.) 90 tablet 1 8/11/2024    Melatonin 10 MG capsule melatonin 10 mg capsule   Take 1 capsule every day by oral route.   8/10/2024    omeprazole (priLOSEC) 20 MG capsule Take 1 capsule by mouth Daily.   8/10/2024    pramipexole (MIRAPEX) 0.5 MG tablet Take 1 tablet by mouth Every 12 (Twelve) Hours. 180 tablet 1 8/10/2024    predniSONE (DELTASONE) 20 MG tablet 3 po daily for 2 days, 2 po daily for 2 days, 1 po daily for 2 days 12 tablet 0 8/10/2024    sertraline (ZOLOFT) 100 MG tablet Zoloft 100 mg tablet   Take 1 tablet every day by oral route.   8/10/2024    tiZANidine (ZANAFLEX) 4 MG tablet tizanidine 4 mg tablet   8/10/2024    valsartan (DIOVAN) 320 MG tablet Take 1 tablet by mouth Daily. 90 tablet 1 8/10/2024    Diclofenac Sodium (VOLTAREN) 1 % gel gel Apply 4 g topically to the appropriate area as directed 4 (Four) Times a Day As Needed (pain). 350 g 11     loperamide (IMODIUM) 2 MG capsule Take 1 capsule by mouth 4 (Four) Times a Day As Needed for Diarrhea.       and Allergies:  Sulfa antibiotics and Penicillins    Objective:    Vital Sign Min/Max for last 24 hours  Temp  Min: 97.7 °F (36.5 °C)  Max: 98.2 °F (36.8 °C)   BP  Min: 148/74  Max: 172/99   Pulse  Min: 73  Max: 85   Resp  Min: 18  Max: 20   SpO2  Min: 93 %  Max: 96 %   Flow (L/min)  Min: 2  Max: 2.5   Weight  Min: 68.4 kg (150 lb 12.7 oz)  Max: 68.8 kg (151 lb 10.8 oz)     Flowsheet Rows      Flowsheet Row First Filed Value   Admission Height 152.4 cm (60\") Documented at 08/11/2024 1148   Admission Weight 68.5 kg (151 lb) Documented at 08/11/2024 1148                   Physical Exam:   Vitals and nursing note reviewed.   Constitutional:       Appearance: Healthy appearance. Not in distress.   Neck:      Vascular: No JVR. JVD normal.   Pulmonary:      Effort: Pulmonary effort is normal.      Breath sounds: Normal breath sounds. No wheezing. No rhonchi. No rales.   Chest:      Chest wall: Not " tender to palpatation.   Cardiovascular:      PMI at left midclavicular line. Normal rate. Regular rhythm. Normal S1. Normal S2.       Murmurs: There is no murmur.      No gallop.  No click. No rub.   Pulses:     Intact distal pulses.   Edema:     Peripheral edema absent.   Abdominal:      General: Bowel sounds are normal.      Palpations: Abdomen is soft.      Tenderness: There is no abdominal tenderness.   Musculoskeletal: Normal range of motion.         General: No tenderness. Skin:     General: Skin is warm and dry.   Neurological:      General: No focal deficit present.      Mental Status: Alert and oriented to person, place and time.         Results Review:   I reviewed the patient's new clinical results.  Results from last 7 days   Lab Units 08/12/24  0601 08/11/24  1153   WBC 10*3/mm3 5.25 8.40   HEMOGLOBIN g/dL 11.0* 13.1   HEMATOCRIT % 31.5* 39.7   PLATELETS 10*3/mm3 103* 136*     Results from last 7 days   Lab Units 08/12/24  0601 08/11/24  1153   SODIUM mmol/L 140 138   POTASSIUM mmol/L 4.4 5.5*   CHLORIDE mmol/L 106 103   CO2 mmol/L 21.1* 16.1*   BUN mg/dL 35* 22   CREATININE mg/dL 1.22* 1.23*   GLUCOSE mg/dL 97 220*   CALCIUM mg/dL 9.0 9.2     Lab Results   Lab Value Date/Time    TROPONINT 111 (C) 08/11/2024 1404    TROPONINT 99 (C) 08/11/2024 1153             Assessment/Plan:      Acute exacerbation of CHF (congestive heart failure)      I have recommended starting Entresto 24-26 mg orally twice daily.  I have recommended discontinuation of hydralazine.  I would recommend uptitrating Entresto to maximal dose before readding this medication.  I have recommended increasing Coreg to 25 mg orally twice per day.  I have recommended starting low-dose digoxin at 0.125 mg every 48 hours.  I have recommended restarting spironolactone at 12.5 mg orally every morning.  I would recommend less than 1.5 L/day fluid restriction and less than 1500 mg/day sodium restriction.  Continued parenteral loop diuretics until  "\"euvolemic\".  Outpatient follow-up with Dr. Orona within 1 month of discharge.    I discussed the patient's findings and my recommendations with patient    Arun Donis MD  08/12/24  15:04 EDT          "

## 2024-08-12 NOTE — PROGRESS NOTES
[facility] HOSPITALIST    PROGRESS NOTE    Name:  Sallie Daily   Age:  79 y.o.  Sex:  female  :  1945  MRN:  [id number]   Visit Number:  [id number]  Admission Date:  2024  Date Of Service:  24  Primary Care Physician:  Minnie Avila DO     LOS: 1 day :    Chief Complaint:      Follow-up shortness of air, generalized weakness    Subjective:    Feels much better today, breathing much improved.  Walking around okay.  Would like to go home soon as she can.    Hospital Course:    Sallie Daily is a 79-year-old woman with past medical history of coronary artery disease, CHF, CKD, anemia, arthritis, diverticulosis, hypertension, hyperlipidemia, hypothyroidism, IBD with sacral stimulator, sleep apnea, osteoporosis.  Presented to [facility] ED on 2024 with concern for shortness of air onset night prior to presentation.  Was not able to get off the commode while having a bowel movement shortness of air.  Denied fevers or chills, abdominal pain, productive cough, N/V/D, urinary symptoms, leg pain or swelling.     ED summary: Afebrile, vital signs stable on 4 L.  EKG left bundle branch block similar to previous morphology, nonspecific ST-T wave changes.  Troponins 99, 111, ACS not suspected.  proBNP over 70,000.  Potassium 5.5.  Creatinine 1.23.  Blood glucose 220.  .  COVID-19 positive CXR cardiomegaly and mild pulmonary vascular congestion, left lung base atelectasis or pneumonia.  CT angio chest no evidence of PE, small to moderate bilateral pleural effusions and mild bibasilar atelectasis, small amount of ascites.  She was provided dexamethasone, Lasix.    Inpatient general floor admission 2024 with acute respiratory failure with hypoxia secondary to suspected elements of acute exacerbation of heart failure as well as COVID-19, elevated troponins ACS not suspected likely demand ischemia, hyperkalemia, hyperglycemia.     Review of Systems:     All systems were  "reviewed and negative except as mentioned in subjective, assessment and plan.    Vital Signs:    Temp:  [97.7 °F (36.5 °C)-98.2 °F (36.8 °C)] 98.1 °F (36.7 °C)  Heart Rate:  [71-85] 79  Resp:  [18-20] 18  BP: (148-172)/(74-99) 148/74    Intake and output:    I/O last 3 completed shifts:  In: -   Out: 1400 [Urine:1400]  I/O this shift:  In: 1080 [P.O.:1080]  Out: -     Physical Examination:    General Appearance:  Alert and cooperative.    Head:  Atraumatic and normocephalic.   Eyes: Conjunctivae and sclerae normal, no icterus. No pallor.   Throat: No oral lesions, no thrush, oral mucosa moist.   Neck: Supple, trachea midline, no thyromegaly.   Lungs:   Breath sounds heard bilaterally equally.  No wheezing or crackles. No Pleural rub or bronchial breathing.   Heart:  Normal S1 and S2, no murmur, no gallop, no rub. No JVD.   Abdomen:   Normal bowel sounds, no masses, no organomegaly. Soft, nontender, nondistended, no rebound tenderness.   Extremities: Supple, no edema, no cyanosis, no clubbing.   Skin: Warm.   Neurologic: Alert and oriented x 3. No facial asymmetry. Moves all four limbs. No tremors.      Laboratory results:    Results from last 7 days   Lab Units 08/12/24  0601 08/11/24  1153   SODIUM mmol/L 140 138   POTASSIUM mmol/L 4.4 5.5*   CHLORIDE mmol/L 106 103   CO2 mmol/L 21.1* 16.1*   BUN mg/dL 35* 22   CREATININE mg/dL 1.22* 1.23*   CALCIUM mg/dL 9.0 9.2   BILIRUBIN mg/dL 0.3 0.9   ALK PHOS U/L 87 115   ALT (SGPT) U/L 27 32   AST (SGOT) U/L 64* 105*   GLUCOSE mg/dL 97 220*     Results from last 7 days   Lab Units 08/12/24  0601 08/11/24  1153   WBC 10*3/mm3 5.25 8.40   HEMOGLOBIN g/dL 11.0* 13.1   HEMATOCRIT % 31.5* 39.7   PLATELETS 10*3/mm3 103* 136*         Results from last 7 days   Lab Units 08/11/24  1404 08/11/24  1153   HSTROP T ng/L 111* 99*         No results for input(s): \"PHART\", \"IMC4VRE\", \"PO2ART\", \"QAU0OGF\", \"BASEEXCESS\" in the last 8760 hours.   I have reviewed the patient's laboratory " results.    Radiology results:    Adult Transthoracic Echo Complete w/ Color, Spectral and Contrast if necessary per protocol    Result Date: 8/12/2024    Left ventricular systolic function is severely decreased. Left ventricular ejection fraction appears to 15-20%. The left ventricular cavity is mildly dilated. Grade I diastolic dysfunction present.   Regional wall motion abnormalities as below.  No left ventricular thrombus noted.   Normal right ventricular size with borderline systolic function.   The right atrial cavity is mild to moderately  dilated.   Mild mitral valve regurgitation is present.   Mild to moderate tricuspid valve regurgitation is present. Estimated right ventricular systolic pressure from tricuspid regurgitation is markedly elevated (>55 mmHg).   There is a small left pleural effusion.     CT Angiogram Chest Pulmonary Embolism    Result Date: 8/11/2024  PROCEDURE: CT ANGIOGRAM CHEST PULMONARY EMBOLISM-  HISTORY: Hypoxic, COVID-positive, eval PE; J96.01-Acute respiratory failure with hypoxia   COMPARISON: None  FINDINGS: Thin section axial CT images of the chest were obtained with contrast. 3D reformatted images were also obtained. This study was performed with techniques to keep radiation doses as low as reasonably achievable, (ALARA). Individualized dose reduction techniques using automated exposure control or adjustment of mA and/or kV according to the patient size were employed.  There is no evidence of pulmonary embolism. The thoracic aorta is not well opacified but there is no evidence of aneurysm or dissection. Multiple mildly enlarged mediastinal nodes are seen which are nonspecific but favor reactive. No mediastinal mass is identified. There is mild bibasilar atelectasis. There are small to moderate bilateral pleural effusions. There is no evidence of pneumothorax. No focal chest wall abnormality is identified.  Limited images of the upper abdomen reveal a small amount of ascites.        Impression:  No evidence of pulmonary embolism.  Small to moderate bilateral pleural effusions and mild bibasilar atelectasis.  Small amount of ascites.     CTDI: 5.32 mGy DLP:173.42 mGy.cm   This report was signed and finalized on 8/11/2024 1:59 PM by Fer Agarwal MD.      XR Chest 1 View    Result Date: 8/11/2024  PROCEDURE: XR CHEST 1 VW-  HISTORY: SOA; J96.01-Acute respiratory failure with hypoxia   COMPARISON: None.  FINDINGS: Cardiomegaly is noted. The mediastinum is normal. There is mild pulmonary vascular congestion. Left lung base opacities are seen consistent with atelectasis or pneumonia. There is no pneumothorax. The bony thorax in intact. A soft tissue calcification is seen adjacent to the left humeral head.      Impression: Cardiomegaly and mild pulmonary vascular congestion.  Left lung base atelectasis or pneumonia.    This report was signed and finalized on 8/11/2024 12:35 PM by Fer Agarwal MD.     I have reviewed the patient's radiology reports.    Medication Review:     I have reviewed the patient's active and prn medications.     Problem List:      Acute exacerbation of CHF (congestive heart failure)      Assessment/Plan:     Comfortable in recliner, pleasantly conversational.  No respiratory distress.    Family updated during course.     Acute respiratory failure with hypoxia  Acute exacerbation of heart failure  COVID-19  CKD  -Supplemental oxygen as needed keep saturation above 90%.  -Dexamethasone, remdesivir, Lasix.  -Nephrology consultation, recommendations appreciated.  -Echocardiogram     Elevated troponins  Heart failure reduced ejection fraction  -Cardiology consultation, recommendations appreciated.  Patient and family do not recall history of severe reduced left ventricular ejection fraction.  -ACS not initially suspected, likely demand ischemia.     Hyperkalemia  -Temporize as needed.     Hyperglycemia, resolved  -A1c normal.     Chronic:  coronary artery disease, CHF, CKD,  anemia, arthritis, diverticulosis, hypertension, hyperlipidemia, hypothyroidism, IBD with sacral stimulator, sleep apnea, osteoporosis.     Continue home medications.     Risk Assessment: Heart  DVT Prophylaxis: Heparin  Code Status: Full code, said she needs to think about it some more  Diet: Cardiac/diabetic/renal/fluid restriction  Discharge Plan: Pending cardiology consultation    Brian Joseph Kerley, DO  08/12/24  14:04 EDT    Dictated utilizing Dragon dictation.

## 2024-08-12 NOTE — PLAN OF CARE
Goal Outcome Evaluation:  Plan of Care Reviewed With: patient        Progress: no change  Outcome Evaluation: OT eval completed. Patient is supine in bed, agrees to participate. Patient is Ox4, denies pain at rest. Patient lives with her  in a one level home, is ind with all self care and HH mobility using a three wheeled walker. Patient's  drives and takes care of IADLs. Patient reports chronic balance issues stemming from a prior fall in which she hit her head. Patient has had OP PT and did not feel like it helped. Is planning to pursue OP ENT evaluation. Patient wears a CPAP at home, no home O2. Patient performed supine to sit with modified ind, sit to stand with SBA and walked 40' with RW with CGA. Patient desatted to 87% on room air, placed back on 2L O2. Patient is able to perform ADLs with CGA overall. Patient is expected to benefit from continued OT services prior to DC, plans to return home, declines need for HH services.      Anticipated Discharge Disposition (OT): home with assist

## 2024-08-12 NOTE — CASE MANAGEMENT/SOCIAL WORK
[facility]  [facility]     Patient Name: Sallie Daily  MRN: [id number]  Today's Date: 8/12/2024    Admit Date: 8/11/2024    Plan: Met with pt and her family for dcp, permission given. Pt provided demographics. She does not have a POA, AD, or financial stressors. Her home DME is a walker and cpap. She and her spouse live in one level home, she is independent with ADL's. Dr. Minnie Fontaine is her primary, [facility] or [facility] are preferred pharmacies. She plans to return home at [place]. States she does not think HH is needed and if O2 is required she request an [facility] and has no preference for provider. States she is feeling much better.   Discharge Needs Assessment       Row Name 08/12/24 1426       Living Environment    People in Home spouse    Name(s) of People in Home Mychal Daily    Current Living Arrangements home    Potentially Unsafe Housing Conditions none    In the past 12 months has the electric, gas, oil, or water company threatened to shut off services in your home? No    Primary Care Provided by self    Family Caregiver if Needed spouse;child(becca), adult    Quality of Family Relationships involved;helpful    Able to Return to Prior Arrangements yes       Resource/Environmental Concerns    Resource/Environmental Concerns none    Transportation Concerns none       Transportation Needs    In the past 12 months, has lack of transportation kept you from medical appointments or from getting medications? no    In the past 12 months, has lack of transportation kept you from meetings, work, or from getting things needed for daily living? No       Food Insecurity    Within the past 12 months, you worried that your food would run out before you got the money to buy more. Never true    Within the past 12 months, the food you bought just didn't last and you didn't have money to get more. Never true       Transition Planning    Patient/Family Anticipates Transition to home with family        Discharge Needs Assessment    Readmission Within the Last 30 Days no previous admission in last 30 days    Equipment Currently Used at Home cpap    Concerns to be Addressed denies needs/concerns at this time    Anticipated Changes Related to Illness none    Equipment Needed After Discharge other (see comments)  possible O2                   Discharge Plan       Row Name 08/12/24 1429       Plan    Plan Met with pt and her family for dcp, permission given. Pt provided demographics. She does not have a POA, AD, or financial stressors. Her home DME is a walker and cpap. She and her spouse live in one level home, she is independent with ADL's. Dr. Minnie Fontaine is her primary, [facility] or [facility] are preferred pharmacies. She plans to return home at [place]. States she does not think HH is needed and if O2 is required she request an [facility] and has no preference for provider. States she is feeling much better.                  Continued Care and Services - Admitted Since 8/11/2024    No active coordination exists for this encounter.          Demographic Summary       Row Name 08/12/24 142       General Information    Admission Type inpatient    Arrived From emergency department    Required Notices Provided Important Message from Medicare    Referral Source admission list    Reason for Consult discharge planning    Preferred Language English       Contact Information    Permission Granted to Share Info With family/designee                   Functional Status       Row Name 08/12/24 1420       Functional Status    Usual Activity Tolerance good    Current Activity Tolerance moderate       Physical Activity    On average, how many days per week do you engage in moderate to strenuous exercise (like a brisk walk)? 0 days    On average, how many minutes do you engage in exercise at this level? 0 min    Number of minutes of exercise per week 0       Functional Status, IADL    Medications independent    Meal Preparation independent     Housekeeping assistive person    Laundry assistive person    Shopping assistive equipment and person       Mental Status    General Appearance WDL WDL       Mental Status Summary    Recent Changes in Mental Status/Cognitive Functioning no changes       Employment/    Employment Status retired                   Psychosocial    No documentation.                  Abuse/Neglect    No documentation.                  Legal    No documentation.                  Substance Abuse    No documentation.                  Patient Forms    No documentation.                     Mirian Gaston RN

## 2024-08-12 NOTE — THERAPY EVALUATION
Patient Name: Sallie Daily  : 1945    MRN: [id number]                              Today's Date: 2024       Admit Date: 2024    Visit Dx:     ICD-10-CM ICD-9-CM   1. Acute hypoxic respiratory failure  J96.01 518.81     Patient Active Problem List   Diagnosis    Essential hypertension    Coronary artery disease involving native coronary artery of native heart without angina pectoris    Pure hypercholesterolemia    Stage 3 chronic kidney disease    Severe obesity (BMI 35.0-39.9) with comorbidity    LBBB (left bundle branch block)    Muscle spasm    Acute exacerbation of CHF (congestive heart failure)     Past Medical History:   Diagnosis Date    Abnormal ECG 2021    Post neck surgery    Allergic Sulfa    Anemia     Arthritis     Cataract Removed    Cellulitis     CHF (congestive heart failure)     Chronic kidney disease 2002    Colon polyp     Coronary artery disease     Congestion after neck surgery led to pneumonia which led to heart “heart attack”    Diverticulitis     Diverticulosis Several    GERD (gastroesophageal reflux disease)     Gout     Heart attack     3/2021  mi    HL (hearing loss)     Hyperlipidemia     Hypertension     Hyperthyroidism Caught early    Inflammatory bowel disease [employer] device    Low back pain     Obesity     Osteoporosis     Pneumonia     Sleep apnea 2010    Thyroid disease     Urinary tract infection Once    Visual impairment Nearsighted     Past Surgical History:   Procedure Laterality Date    CAROTID ENDARTERECTOMY      COLON SURGERY  1.5 ft removed    COLONOSCOPY      2022, q1yr , [facility]    EYE SURGERY  Cataract/lens    HERNIA REPAIR  5 times    HYSTERECTOMY   ?    JOINT REPLACEMENT  Knee, rotator cuff x 2    NECK SURGERY      REPLACEMENT TOTAL KNEE      ROTATOR CUFF REPAIR      SINUS SURGERY      SPINE SURGERY  Upper spine    TONSILLECTOMY  1950 ?    TUBAL ABDOMINAL LIGATION        General Information       Row Name  08/12/24 1245          OT Time and Intention    Document Type evaluation  -SD     Mode of Treatment occupational therapy  -SD       Row Name 08/12/24 1245          General Information    Patient Profile Reviewed yes  -SD     Prior Level of Function independent:;all household mobility;community mobility;ADL's  -SD     Existing Precautions/Restrictions fall;oxygen therapy device and L/min  -SD     Barriers to Rehab medically complex  -SD       Row Name 08/12/24 1245          Living Environment    People in Home significant other  -SD       Row Name 08/12/24 1245          Home Main Entrance    Number of Stairs, Main Entrance two  -SD       Row Name 08/12/24 1245          Stairs Within Home, Primary    Number of Stairs, Within Home, Primary none  -SD       Row Name 08/12/24 1245          Cognition    Orientation Status (Cognition) oriented x 4  -SD       Row Name 08/12/24 1245          Safety Issues, Functional Mobility    Safety Issues Affecting Function (Mobility) safety precautions follow-through/compliance;safety precaution awareness  -SD     Impairments Affecting Function (Mobility) balance;endurance/activity tolerance;range of motion (ROM)  -SD               User Key  (r) = Recorded By, (t) = Taken By, (c) = Cosigned By      Initials Name Provider Type    SD Teresa Tobar OT Occupational Therapist                     Mobility/ADL's       Row Name 08/12/24 1246          Bed Mobility    Bed Mobility supine-sit  -SD     Supine-Sit [place] (Bed Mobility) modified independence  -SD     Assistive Device (Bed Mobility) bed rails;head of bed elevated  -SD       Row Name 08/12/24 1246          Transfers    Transfers sit-stand transfer;toilet transfer  -SD       Row Name 08/12/24 1246          Sit-Stand Transfer    Sit-Stand [place] (Transfers) standby assist  -SD     Assistive Device (Sit-Stand Transfers) walker, front-wheeled  -SD       Row Name 08/12/24 1246          Toilet Transfer    Type (Toilet  Transfer) sit-stand;stand-sit  -SD     [place] (Toilet Transfer) standby assist  -SD     Assistive Device (Toilet Transfer) commode;grab bars/safety frame  -SD       Row Name 08/12/24 1246          Functional Mobility    Functional Mobility- Ind. Level contact guard assist  -SD     Functional Mobility- Device walker, front-wheeled  -SD     Functional Mobility-Distance (Feet) 40  -SD     Functional Mobility- Safety Issues balance decreased during turns;supplemental O2  -SD       Row Name 08/12/24 1246          Activities of Daily Living    BADL Assessment/Intervention bathing;upper body dressing;lower body dressing;grooming;feeding;toileting  -SD       Row Name 08/12/24 1246          Bathing Assessment/Intervention    [place] (Bathing) contact guard assist  -SD       Row Name 08/12/24 1246          Upper Body Dressing Assessment/Training    [place] (Upper Body Dressing) set up  -SD       Row Name 08/12/24 1246          Lower Body Dressing Assessment/Training    [place] (Lower Body Dressing) contact guard assist  -SD       Row Name 08/12/24 1246          Grooming Assessment/Training    [place] (Grooming) set up  -SD       Row Name 08/12/24 1246          Self-Feeding Assessment/Training    [place] (Feeding) set up  -SD       Row Name 08/12/24 1246          Toileting Assessment/Training    [place] (Toileting) contact guard assist  -SD     Assistive Devices (Toileting) commode  -SD               User Key  (r) = Recorded By, (t) = Taken By, (c) = Cosigned By      Initials Name Provider Type    SD Teresa Tobar OT Occupational Therapist                   Obj/Interventions       Row Name 08/12/24 1248          Range of Motion Comprehensive    General Range of Motion upper extremity range of motion deficits identified  -SD     Comment, General Range of Motion L sahoulder flexion deficits d/t prior rotator cuff surgery  -SD       Row Name  08/12/24 1248          Strength Comprehensive (MMT)    General Manual Muscle Testing (MMT) Assessment upper extremity strength deficits identified  -SD               User Key  (r) = Recorded By, (t) = Taken By, (c) = Cosigned By      Initials Name Provider Type    Teresa Ayers, OT Occupational Therapist                   Goals/Plan       Row Name 08/12/24 1303          Transfer Goal 1 (OT)    Activity/Assistive Device (Transfer Goal 1, OT) sit-to-stand/stand-to-sit;walker, rolling  -SD     [place] Level/Cues Needed (Transfer Goal 1, OT) modified independence  -SD     Time Frame (Transfer Goal 1, OT) long term goal (LTG)  -SD     Progress/Outcome (Transfer Goal 1, OT) goal ongoing  -SD       Row Name 08/12/24 1303          Bathing Goal 1 (OT)    Activity/Device (Bathing Goal 1, OT) bathing skills, all  -SD     [place] Level/Cues Needed (Bathing Goal 1, OT) standby assist  -SD     Time Frame (Bathing Goal 1, OT) 2 weeks  -SD     Progress/Outcomes (Bathing Goal 1, OT) goal ongoing  -SD       Row Name 08/12/24 1303          Toileting Goal 1 (OT)    Activity/Device (Toileting Goal 1, OT) toileting skills, all;commode  -SD     [place] Level/Cues Needed (Toileting Goal 1, OT) supervision required  -SD     Time Frame (Toileting Goal 1, OT) 2 weeks  -SD     Progress/Outcome (Toileting Goal 1, OT) goal ongoing  -SD       Row Name 08/12/24 1303          Strength Goal 1 (OT)    Strength Goal 1 (OT) Patient to perform UB ther ex as tolerated  -SD     Time Frame (Strength Goal 1, OT) long term goal (LTG)  -SD     Progress/Outcome (Strength Goal 1, OT) goal ongoing  -SD       Row Name 08/12/24 1303          Therapy Assessment/Plan (OT)    Planned Therapy Interventions (OT) activity tolerance training;adaptive equipment training;BADL retraining;patient/caregiver education/training;ROM/therapeutic exercise;strengthening exercise;transfer/mobility retraining  -SD               User Key  (r) = Recorded By, (t) =  Taken By, (c) = Cosigned By      Initials Name Provider Type    Teresa Ayers OT Occupational Therapist                   Clinical Impression       Row Name 08/12/24 1248          Pain Assessment    Pretreatment Pain Rating 0/10 - no pain  -SD     Posttreatment Pain Rating 0/10 - no pain  -SD       Row Name 08/12/24 1248          Plan of Care Review    Plan of Care Reviewed With patient  -SD     Progress no change  -SD     Outcome Evaluation OT eval completed. Patient is supine in bed, agrees to participate. Patient is Ox4, denies pain at rest. Patient lives with her  in a one level home, is ind with all self care and HH mobility using a three wheeled walker. Patient's  drives and takes care of IADLs. Patient reports chronic balance issues stemming from a prior fall in which she hit her head. Patient has had OP PT and did not feel like it helped. Is planning to pursue OP ENT evaluation. Patient wears a CPAP at home, no home O2. Patient performed supine to sit with modified ind, sit to stand with SBA and walked 40' with RW with CGA. Patient desatted to 87% on room air, placed back on 2L O2. Patient is able to perform ADLs with CGA overall. Patient is expected to benefit from continued OT services prior to DC, plans to return home, declines need for HH services.  -SD       Row Name 08/12/24 1244          Therapy Assessment/Plan (OT)    Patient/Family Therapy Goal Statement (OT) home  -SD     Rehab Potential (OT) good, to achieve stated therapy goals  -SD     Criteria for Skilled Therapeutic Interventions Met (OT) skilled treatment is necessary  -SD     Therapy Frequency (OT) 5 times/wk  Mon-Fri  -SD       Row Name 08/12/24 1241          Therapy Plan Review/Discharge Plan (OT)    Anticipated Discharge Disposition (OT) home with assist  -SD       Row Name 08/12/24 1248          Vital Signs    Pre SpO2 (%) 95  -SD     O2 Delivery Pre Treatment supplemental O2  -SD     Intra SpO2 (%) 87  -SD     O2  Delivery Intra Treatment room air  -SD     Post SpO2 (%) 95  -SD     O2 Delivery Post Treatment supplemental O2  -SD       Row Name 08/12/24 1248          Positioning and Restraints    Pre-Treatment Position in bed  -SD     Post Treatment Position chair  -SD     In Chair sitting;call light within reach;encouraged to call for assist  -SD               User Key  (r) = Recorded By, (t) = Taken By, (c) = Cosigned By      Initials Name Provider Type    Teresa Ayers OT Occupational Therapist                   Outcome Measures       Row Name 08/12/24 1305          How much help from another is currently needed...    Putting on and taking off regular lower body clothing? 3  -SD     Bathing (including washing, rinsing, and drying) 3  -SD     Toileting (which includes using toilet bed pan or urinal) 3  -SD     Putting on and taking off regular upper body clothing 4  -SD     Taking care of personal grooming (such as brushing teeth) 4  -SD     Eating meals 4  -SD     AM-PAC 6 Clicks Score (OT) 21  -SD       Row Name 08/12/24 1305          Functional Assessment    Outcome Measure Options AM-PAC 6 Clicks Daily Activity (OT)  -SD               User Key  (r) = Recorded By, (t) = Taken By, (c) = Cosigned By      Initials Name Provider Type    Teresa Ayers OT Occupational Therapist                    Occupational Therapy Education       Title: PT OT SLP Therapies (In Progress)       Topic: Occupational Therapy (In Progress)       Point: ADL training (Done)       Description:   Instruct learner(s) on proper safety adaptation and remediation techniques during self care or transfers.   Instruct in proper use of assistive devices.                  Learning Progress Summary             Patient Acceptance, E,TB, VU by SD at 8/12/2024 1305    Comment: OT POC                         Point: Home exercise program (Not Started)       Description:   Instruct learner(s) on appropriate technique for monitoring, assisting and/or  progressing therapeutic exercises/activities.                  Learner Progress:  Not documented in this visit.              Point: Precautions (Not Started)       Description:   Instruct learner(s) on prescribed precautions during self-care and functional transfers.                  Learner Progress:  Not documented in this visit.              Point: Body mechanics (Not Started)       Description:   Instruct learner(s) on proper positioning and spine alignment during self-care, functional mobility activities and/or exercises.                  Learner Progress:  Not documented in this visit.                              User Key       Initials Effective Dates Name Provider Type Discipline    SD 06/16/21 -  Teresa Tobar OT Occupational Therapist OT                  OT Recommendation and Plan  Planned Therapy Interventions (OT): activity tolerance training, adaptive equipment training, BADL retraining, patient/caregiver education/training, ROM/therapeutic exercise, strengthening exercise, transfer/mobility retraining  Therapy Frequency (OT): 5 times/wk (Mon-Fri)  Plan of Care Review  Plan of Care Reviewed With: patient  Progress: no change  Outcome Evaluation: OT eval completed. Patient is supine in bed, agrees to participate. Patient is Ox4, denies pain at rest. Patient lives with her  in a one level home, is ind with all self care and HH mobility using a three wheeled walker. Patient's  drives and takes care of IADLs. Patient reports chronic balance issues stemming from a prior fall in which she hit her head. Patient has had OP PT and did not feel like it helped. Is planning to pursue OP ENT evaluation. Patient wears a CPAP at home, no home O2. Patient performed supine to sit with modified ind, sit to stand with SBA and walked 40' with RW with CGA. Patient desatted to 87% on room air, placed back on 2L O2. Patient is able to perform ADLs with CGA overall. Patient is expected to benefit from  continued OT services prior to DC, plans to return home, declines need for  services.     Time Calculation:         Time Calculation- OT       Row Name 08/12/24 1306             Time Calculation- OT    OT Start Time 1111  -SD      OT Received On 08/12/24  -SD      OT Goal Re-Cert Due Date 08/22/24  -SD         Untimed Charges    OT Eval/Re-eval Minutes 60  -SD         Total Minutes    Untimed Charges Total Minutes 60  -SD       Total Minutes 60  -SD                User Key  (r) = Recorded By, (t) = Taken By, (c) = Cosigned By      Initials Name Provider Type    Teresa Ayers OT Occupational Therapist                  Therapy Charges for Today       Code Description Service Date Service Provider Modifiers Qty    [id number] HC OT EVAL LOW COMPLEXITY 4 8/12/2024 Teresa Tobar OT GO 1                 Teresa Tobar OT  8/12/2024

## 2024-08-12 NOTE — CONSULTS
[facility]      Nephrology Consultation      Referring Provider:   Kerley, Brian Joseph, DO    Reason for Consultation:  CKD stage 3a and associated problems.    Subjective:  Chief complaint   Chief Complaint   Patient presents with    Shortness of Breath     Pt. Came in via EMS C/O nausea, vomiting, difficulty breathing. She was going to BR when they got there and could barely sit up, Spo2 89%, end tidal of 16, 4L NC went up to 94% with end tidal of 30. Elevated BP in route.     History of present illness:    Ms. Daily is a 79-year-old female patient with PMH significant for CAD, CHF, CKD (baseline sCr ~1.2), anemia, diverticulosis, HTN, HLD, ANJEL and osteoporosis. She presented to the Emergency Department yesterday complaining of shortness of breath, started the night before. She denied CP, abdominal pain, productive cough, fever, N/V/D.  Initial workup in ED notable for afebrile, VSS on 4L supplemental oxygen. HS troponin 99 without signs of ST elevation on EKG. proBNP >70k. K+ 5.5. sCr 1.23. COVID-19 positive. CXR with mild pulmonary vascular congestion; CTA chest without evidence of PE, noted small-moderate BL pleural effusions, mild bibasilar atelectasis. She was given IV steroids and lasix in ED. She was admitted by the hospitalist service for acute CHF exacerbation, hypervolemia.  Nephrology has been consulted for further management of CKD and associated problems. Upon initial exam, she is awake in bed, eating breakfast. Her oxygen demands have lessened. She denies CP or SOA at rest. She has some mild expiratory wheezing. She reports urinating a lot after IV lasix.    I have reviewed labs/imaging/records from this hospitalization, including ER staff and admitting/attending physicians H/P's and progress notes to establish a comprehensive understanding of this patient's clinical hospital course, as well as to establish plan of care appropriately.   Past Medical History:   Diagnosis Date     Abnormal ECG 2021    Post neck surgery    Allergic Sulfa    Anemia     Arthritis     Cataract Removed    Cellulitis     CHF (congestive heart failure)     Chronic kidney disease 2002    Colon polyp     Coronary artery disease     Congestion after neck surgery led to pneumonia which led to heart “heart attack”    Diverticulitis     Diverticulosis Several    GERD (gastroesophageal reflux disease)     Gout     Heart attack     3/2021  mi    HL (hearing loss)     Hyperlipidemia     Hypertension     Hyperthyroidism Caught early    Inflammatory bowel disease [employer] device    Low back pain     Obesity     Osteoporosis     Pneumonia     Sleep apnea 2010    Thyroid disease     Urinary tract infection Once    Visual impairment Nearsighted       Past Surgical History:   Procedure Laterality Date    CAROTID ENDARTERECTOMY      COLON SURGERY  1.5 ft removed    COLONOSCOPY      2022, q1yr , [facility]    EYE SURGERY  Cataract/lens    HERNIA REPAIR  5 times    HYSTERECTOMY   ?    JOINT REPLACEMENT  Knee, rotator cuff x 2    NECK SURGERY      REPLACEMENT TOTAL KNEE      ROTATOR CUFF REPAIR      SINUS SURGERY      SPINE SURGERY  Upper spine    TONSILLECTOMY  1950 ?    TUBAL ABDOMINAL LIGATION       Family History   Problem Relation Age of Onset    Thyroid disease Mother     Osteoporosis Mother     Obesity Mother     Heart attack Mother     Arthritis Mother     Obesity Father     Heart attack Father     Cancer Father         prostate    Thyroid disease Sister     Thyroid disease Sister     Thyroid disease Daughter        Social History     Tobacco Use    Smoking status: Former     Current packs/day: 0.00     Average packs/day: 1 pack/day for 42.1 years (42.1 ttl pk-yrs)     Types: Cigarettes     Start date: 10/1/1963     Quit date: 2005     Years since quittin.7    Smokeless tobacco: Never   Vaping Use    Vaping status: Never Used   Substance Use Topics    Alcohol use: Not  Currently     Comment: Glass of wine or beer occasionally    Drug use: Never     Home medications:   Prior to Admission Medications       Prescriptions Last Dose Informant Patient Reported? Taking?    carvedilol (COREG) 25 MG tablet 8/10/2024  No Yes    TAKE 1 TABLET BY MOUTH  TWICE DAILY WITH MEALS    chlorthalidone (HYGROTON) 25 MG tablet 8/10/2024  No Yes    Take 1 tablet by mouth Daily.    Cholecalciferol 125 MCG (5000 UT) tablet 8/10/2024  Yes Yes    Vitamin D3 125 mcg (5,000 unit) tablet   Take by oral route.    hydrALAZINE (APRESOLINE) 100 MG tablet 8/10/2024  No Yes    Take 1 tablet by mouth 3 (Three) Times a Day.    levocetirizine (XYZAL) 5 MG tablet 8/10/2024  Yes Yes    Take 1 tablet by mouth Daily.    levothyroxine (SYNTHROID, LEVOTHROID) 150 MCG tablet 8/11/2024  No Yes    Take 1 tablet by mouth Daily.    Patient taking differently:  Take 175 mcg by mouth Daily.    Melatonin 10 MG capsule 8/10/2024  Yes Yes    melatonin 10 mg capsule   Take 1 capsule every day by oral route.    omeprazole (priLOSEC) 20 MG capsule 8/10/2024  Yes Yes    Take 1 capsule by mouth Daily.    pramipexole (MIRAPEX) 0.5 MG tablet 8/10/2024  No Yes    Take 1 tablet by mouth Every 12 (Twelve) Hours.    predniSONE (DELTASONE) 20 MG tablet 8/10/2024  No Yes    3 po daily for 2 days, 2 po daily for 2 days, 1 po daily for 2 days    sertraline (ZOLOFT) 100 MG tablet 8/10/2024  Yes Yes    Zoloft 100 mg tablet   Take 1 tablet every day by oral route.    tiZANidine (ZANAFLEX) 4 MG tablet 8/10/2024  Yes Yes    tizanidine 4 mg tablet    valsartan (DIOVAN) 320 MG tablet 8/10/2024  No Yes    Take 1 tablet by mouth Daily.    Diclofenac Sodium (VOLTAREN) 1 % gel gel   No No    Apply 4 g topically to the appropriate area as directed 4 (Four) Times a Day As Needed (pain).    loperamide (IMODIUM) 2 MG capsule   Yes No    Take 1 capsule by mouth 4 (Four) Times a Day As Needed for Diarrhea.          Emergency department medications:   Medications    sodium chloride 0.9 % flush 10 mL (10 mL Intravenous Given 8/11/24 2015)   sodium chloride 0.9 % flush 10 mL (has no administration in time range)   sodium chloride 0.9 % infusion 40 mL (has no administration in time range)   ondansetron (ZOFRAN) injection 4 mg (has no administration in time range)   heparin (porcine) 5000 UNIT/ML injection 5,000 Units (5,000 Units Subcutaneous Given 8/11/24 2015)   nitroglycerin (NITROSTAT) SL tablet 0.4 mg (has no administration in time range)   Potassium Replacement - Follow Nurse / BPA Driven Protocol (has no administration in time range)   Magnesium Standard Dose Replacement - Follow Nurse / BPA Driven Protocol (has no administration in time range)   Phosphorus Replacement - Follow Nurse / BPA Driven Protocol (has no administration in time range)   Calcium Replacement - Follow Nurse / BPA Driven Protocol (has no administration in time range)   acetaminophen (TYLENOL) tablet 650 mg (has no administration in time range)     Or   acetaminophen (TYLENOL) 160 MG/5ML oral solution 650 mg (has no administration in time range)     Or   acetaminophen (TYLENOL) suppository 650 mg (has no administration in time range)   dexAMETHasone (DECADRON) tablet 6 mg (has no administration in time range)     Or   dexAMETHasone sodium phosphate injection 6 mg (has no administration in time range)   Pharmacy Consult - Remdesivir for Severe COVID-19 (Within 7 days of symptom onset) (has no administration in time range)   furosemide (LASIX) injection 40 mg (has no administration in time range)   remdesivir 200 mg in sodium chloride 0.9 % 290 mL IVPB (powder vial) (200 mg Intravenous New Bag 8/11/24 9150)     Followed by   remdesivir 100 mg in sodium chloride 0.9 % 270 mL IVPB (powder vial) (has no administration in time range)   dexAMETHasone sodium phosphate injection 6 mg (6 mg Intravenous Given 8/11/24 1311)   iopamidol (ISOVUE-300) 61 % injection 100 mL (70 mL Intravenous Given 8/11/24 1355)  "  furosemide (LASIX) injection 80 mg (80 mg Intravenous Given 8/11/24 1429)   insulin regular (humuLIN R,novoLIN R) injection 10 Units (10 Units Subcutaneous Given 8/11/24 1733)   dextrose (D50W) (25 g/50 mL) IV injection 50 mL (50 mL Intravenous Given 8/11/24 1733)       Allergies:  Sulfa antibiotics and Penicillins    Review of Systems  14 point review of system were done and are negative except as mentioned in the history of present illness and assessment and plan.  Patient is unresponsive and sedated no meaningful review of system is possible.  Patient is intubated and sedated no meaningful review of system is possible.    Physical Exam:  Objective:  Vital Signs  /99 (BP Location: Right arm, Patient Position: Lying)   Pulse 79   Temp 97.7 °F (36.5 °C) (Oral)   Resp 20   Ht 152.4 cm (60\")   Wt 68.8 kg (151 lb 10.8 oz)   SpO2 93%   BMI 29.62 kg/m²   Objective    No intake/output data recorded.    Intake/Output Summary (Last 24 hours) at 8/12/2024 0735  Last data filed at 8/12/2024 0037  Gross per 24 hour   Intake --   Output 1400 ml   Net -1400 ml        Physical Exam     Constitutional: Awake, alert  Eyes: sclerae anicteric, no conjunctival injection  HEENT: mucous membranes dry  Neck: Supple, no thyromegaly, no lymphadenopathy, trachea midline, No JVD  Respiratory: mild R expiratory wheezing, bibasilar rhonchi, nonlabored respirations   Cardiovascular: RRR, 1/6 to 2/6 systolic murmurs, no rubs, or gallops.  Gastrointestinal: Positive bowel sounds, obese, soft, nontender, nondistended  Musculoskeletal: No edema, no clubbing or cyanosis  Psychiatric: Appropriate affect, cooperative  Neurologic: Oriented x 3, moving all extremities, Cranial Nerves grossly intact, speech clear  Skin: warm and dry, no rashes            Results Review:   Results from last 7 days   Lab Units 08/12/24  0601 08/11/24  1153   SODIUM mmol/L 140 138   POTASSIUM mmol/L 4.4 5.5*   CHLORIDE mmol/L 106 103   CO2 mmol/L 21.1* 16.1* " "  BUN mg/dL 35* 22   CREATININE mg/dL 1.22* 1.23*   CALCIUM mg/dL 9.0 9.2   ALBUMIN g/dL 3.4* 3.6   BILIRUBIN mg/dL 0.3 0.9   ALK PHOS U/L 87 115   ALT (SGPT) U/L 27 32   AST (SGOT) U/L 64* 105*   GLUCOSE mg/dL 97 220*     Estimated Creatinine Clearance: 32.3 mL/min (A) (by C-G formula based on SCr of 1.22 mg/dL (H)).  Results from last 7 days   Lab Units 08/11/24  1153   MAGNESIUM mg/dL 1.9         Results from last 7 days   Lab Units 08/12/24  0601 08/11/24  1153   WBC 10*3/mm3 5.25 8.40   HEMOGLOBIN g/dL 11.0* 13.1   PLATELETS 10*3/mm3 103* 136*         Brief Urine Lab Results       None          No results found for: \"UTPCR\"  Imaging Results (Last 24 Hours)       Procedure Component Value Units Date/Time    CT Angiogram Chest Pulmonary Embolism [[id number]] Collected: 08/11/24 1356     Updated: 08/11/24 1401    Narrative:      PROCEDURE: CT ANGIOGRAM CHEST PULMONARY EMBOLISM-     HISTORY: Hypoxic, COVID-positive, eval PE; J96.01-Acute respiratory  failure with hypoxia        COMPARISON: None     FINDINGS: Thin section axial CT images of the chest were obtained with  contrast. 3D reformatted images were also obtained. This study was  performed with techniques to keep radiation doses as low as reasonably  achievable, (ALARA). Individualized dose reduction techniques using  automated exposure control or adjustment of mA and/or kV according to  the patient size were employed.     There is no evidence of pulmonary embolism. The thoracic aorta is not  well opacified but there is no evidence of aneurysm or dissection.  Multiple mildly enlarged mediastinal nodes are seen which are  nonspecific but favor reactive. No mediastinal mass is identified. There  is mild bibasilar atelectasis. There are small to moderate bilateral  pleural effusions. There is no evidence of pneumothorax. No focal chest  wall abnormality is identified.     Limited images of the upper abdomen reveal a small amount of ascites.          Impression:   "       No evidence of pulmonary embolism.     Small to moderate bilateral pleural effusions and mild bibasilar  atelectasis.     Small amount of ascites.              CTDI: 5.32 mGy  DLP:173.42 mGy.cm        This report was signed and finalized on 8/11/2024 1:59 PM by Fer Agarwal MD.       XR Chest 1 View [[id number]] Collected: 08/11/24 1234     Updated: 08/11/24 1237    Narrative:      PROCEDURE: XR CHEST 1 VW-     HISTORY: SOA; J96.01-Acute respiratory failure with hypoxia        COMPARISON: None.     FINDINGS: Cardiomegaly is noted. The mediastinum is normal. There is  mild pulmonary vascular congestion. Left lung base opacities are seen  consistent with atelectasis or pneumonia. There is no pneumothorax. The  bony thorax in intact. A soft tissue calcification is seen adjacent to  the left humeral head.       Impression:      Cardiomegaly and mild pulmonary vascular congestion.     Left lung base atelectasis or pneumonia.           This report was signed and finalized on 8/11/2024 12:35 PM by Fer Agarwal MD.             dexAMETHasone, 6 mg, Oral, Daily   Or  dexAMETHasone, 6 mg, Intravenous, Daily  furosemide, 40 mg, Intravenous, BID  heparin (porcine), 5,000 Units, Subcutaneous, Q12H  remdesivir, 100 mg, Intravenous, Q24H  sodium chloride, 10 mL, Intravenous, Q12H      Pharmacy Consult - Remdesivir,         Assessment/Plan:      Acute exacerbation of CHF (congestive heart failure)    CKD IIIa: Evidence of chronic renal impairment per chart review, sCr baseline around 1.2 since 2021. Initial sCr 1.23 on presentation which is at her baseline. No evidence of acute kidney injury although she did receive IV contrast 08/11. Etiology of CKD unclear will check urine studies.  She did mention that she follows up with the nephrologist in [place] and was told that she may have mild chronic kidney disease.  CHF: with acute exacerbation. Initial proBNP >70k, pulmonary vascular congestion on chest imaging. ECHO  pending. Optimize volume status and medications.  Cardiology has been consulted.  Acute hypoxemic respiratory failure: intially hypoxic, requiring supplemental oxygen. Etiology r/t acute hypervolemia, complicated by possible PNA, + COVID-19. She is getting IV steroids and remdesivir.  Hypertension: re-introduce home meds as tolerated. Will restart coreg and hydralazine at lower doses and monitor response. Hold valsartan for now      Risk and complexity: High       Plan:  Initially with mild hyperkalemia, since resolved. Currently electrolytes and waste products stable. Renal function is at baseline.  Avoid nephrotoxic meds, further IV contrast dye.  Strict intake and output. Encourage oral intake.  Optimize volume status, careful monitoring of response to diuretics.  Continue with rest of the current treatment plan and surveillance labs.  Details were discussed with the patient no family in the room.    Details were also discussed with the hospitalist service.   Further recommendations will depend on clinical course of the patient during the current hospitalization.    I also discussed the details with the nursing staff.  Rest as ordered.    In closing, I sincerely appreciate opportunity to participate in care of this patient. If I can be of any further assistance with the management of this patient, please don’t hesitate to contact me.    Aung Velarde MD, TAMI    08/12/24  07:35 EDT    Dictated using Dragon.

## 2024-08-12 NOTE — THERAPY DISCHARGE NOTE
PT order was received.  Evaluation was not performed, per patient's request.  She reports that she worked with therapy this morning , she worked with OT.  She reports she is at her baseline of mobility and is hoping to get in the shower this evening.  She is instructed on the importance of mobility and agrees that she will continue to get up and moving in her room.  She is also instructed to let the nurses know if she feels like she is getting weak or needs PT assistance.  PT will sign off at this time.

## 2024-08-12 NOTE — PLAN OF CARE
Problem: Adult Inpatient Plan of Care  Goal: Plan of Care Review  Outcome: Ongoing, Progressing  Flowsheets (Taken 8/11/2024 2218)  Progress: no change  Plan of Care Reviewed With: patient  Goal: Patient-Specific Goal (Individualized)  Outcome: Ongoing, Progressing  Goal: Absence of Hospital-Acquired Illness or Injury  Outcome: Ongoing, Progressing  Intervention: Identify and Manage Fall Risk  Recent Flowsheet Documentation  Taken 8/11/2024 2200 by Julio Angulo RN  Safety Promotion/Fall Prevention:   activity supervised   assistive device/personal items within reach   fall prevention program maintained   nonskid shoes/slippers when out of bed   safety round/check completed  Taken 8/11/2024 2000 by Julio Angulo RN  Safety Promotion/Fall Prevention:   activity supervised   assistive device/personal items within reach   fall prevention program maintained   nonskid shoes/slippers when out of bed   safety round/check completed  Intervention: Prevent Skin Injury  Recent Flowsheet Documentation  Taken 8/11/2024 2200 by Julio Angulo RN  Body Position: position changed independently  Taken 8/11/2024 2000 by Julio Angulo RN  Body Position: position changed independently  Intervention: Prevent and Manage VTE (Venous Thromboembolism) Risk  Recent Flowsheet Documentation  Taken 8/11/2024 2200 by Julio Angulo RN  Activity Management: activity encouraged  Taken 8/11/2024 2000 by Julio Angulo RN  Activity Management: activity encouraged  VTE Prevention/Management: patient refused intervention  Intervention: Prevent Infection  Recent Flowsheet Documentation  Taken 8/11/2024 2200 by Julio Angulo RN  Infection Prevention:   environmental surveillance performed   equipment surfaces disinfected   single patient room provided   hand hygiene promoted   rest/sleep promoted  Taken 8/11/2024 2000 by Julio Angulo RN  Infection Prevention:   environmental surveillance performed   equipment surfaces  disinfected   single patient room provided   hand hygiene promoted   rest/sleep promoted  Goal: Optimal Comfort and Wellbeing  Outcome: Ongoing, Progressing  Goal: Readiness for Transition of Care  Outcome: Ongoing, Progressing     Problem: Obstructive Sleep Apnea Risk or Actual Comorbidity Management  Goal: Unobstructed Breathing During Sleep  Outcome: Ongoing, Progressing     Problem: Skin Injury Risk Increased  Goal: Skin Health and Integrity  Outcome: Ongoing, Progressing  Intervention: Optimize Skin Protection  Recent Flowsheet Documentation  Taken 8/11/2024 2200 by Julio Angulo RN  Head of Bed (HOB) Positioning: HOB at 20 degrees  Taken 8/11/2024 2000 by Julio Angulo RN  Head of Bed (HOB) Positioning: HOB at 20 degrees     Problem: Gas Exchange Impaired  Goal: Optimal Gas Exchange  Outcome: Ongoing, Progressing  Intervention: Optimize Oxygenation and Ventilation  Recent Flowsheet Documentation  Taken 8/11/2024 2200 by Julio Angulo RN  Head of Bed (HOB) Positioning: HOB at 20 degrees  Taken 8/11/2024 2000 by Julio Angulo RN  Head of Bed (HOB) Positioning: HOB at 20 degrees   Goal Outcome Evaluation:  Plan of Care Reviewed With: patient        Progress: no change

## 2024-08-13 ENCOUNTER — READMISSION MANAGEMENT (OUTPATIENT)
Dept: CALL CENTER | Facility: HOSPITAL | Age: 79
End: 2024-08-13
Payer: MEDICARE

## 2024-08-13 VITALS
HEIGHT: 60 IN | DIASTOLIC BLOOD PRESSURE: 75 MMHG | HEART RATE: 63 BPM | SYSTOLIC BLOOD PRESSURE: 161 MMHG | RESPIRATION RATE: 16 BRPM | OXYGEN SATURATION: 99 % | BODY MASS INDEX: 28 KG/M2 | WEIGHT: 142.64 LBS | TEMPERATURE: 97.5 F

## 2024-08-13 PROBLEM — D89.832 CYTOKINE RELEASE SYNDROME, GRADE 2: Status: ACTIVE | Noted: 2024-08-13

## 2024-08-13 LAB
ALBUMIN SERPL-MCNC: 3.1 G/DL (ref 3.5–5.2)
ALBUMIN/GLOB SERPL: 1.2 G/DL
ALP SERPL-CCNC: 70 U/L (ref 39–117)
ALT SERPL W P-5'-P-CCNC: 23 U/L (ref 1–33)
ANION GAP SERPL CALCULATED.3IONS-SCNC: 14.5 MMOL/L (ref 5–15)
AST SERPL-CCNC: 51 U/L (ref 1–32)
BASOPHILS # BLD AUTO: 0.01 10*3/MM3 (ref 0–0.2)
BASOPHILS NFR BLD AUTO: 0.2 % (ref 0–1.5)
BILIRUB SERPL-MCNC: 0.4 MG/DL (ref 0–1.2)
BUN SERPL-MCNC: 43 MG/DL (ref 8–23)
BUN/CREAT SERPL: 34.7 (ref 7–25)
CALCIUM SPEC-SCNC: 9.3 MG/DL (ref 8.6–10.5)
CHLORIDE SERPL-SCNC: 104 MMOL/L (ref 98–107)
CO2 SERPL-SCNC: 20.5 MMOL/L (ref 22–29)
CREAT SERPL-MCNC: 1.24 MG/DL (ref 0.57–1)
DEPRECATED RDW RBC AUTO: 48.3 FL (ref 37–54)
EGFRCR SERPLBLD CKD-EPI 2021: 44.4 ML/MIN/1.73
EOSINOPHIL # BLD AUTO: 0.01 10*3/MM3 (ref 0–0.4)
EOSINOPHIL NFR BLD AUTO: 0.2 % (ref 0.3–6.2)
ERYTHROCYTE [DISTWIDTH] IN BLOOD BY AUTOMATED COUNT: 13.3 % (ref 12.3–15.4)
GLOBULIN UR ELPH-MCNC: 2.6 GM/DL
GLUCOSE SERPL-MCNC: 83 MG/DL (ref 65–99)
HCT VFR BLD AUTO: 32.4 % (ref 34–46.6)
HGB BLD-MCNC: 11.2 G/DL (ref 12–15.9)
IMM GRANULOCYTES # BLD AUTO: 0.03 10*3/MM3 (ref 0–0.05)
IMM GRANULOCYTES NFR BLD AUTO: 0.6 % (ref 0–0.5)
LYMPHOCYTES # BLD AUTO: 1.09 10*3/MM3 (ref 0.7–3.1)
LYMPHOCYTES NFR BLD AUTO: 22.9 % (ref 19.6–45.3)
MCH RBC QN AUTO: 33.8 PG (ref 26.6–33)
MCHC RBC AUTO-ENTMCNC: 34.6 G/DL (ref 31.5–35.7)
MCV RBC AUTO: 97.9 FL (ref 79–97)
MONOCYTES # BLD AUTO: 0.48 10*3/MM3 (ref 0.1–0.9)
MONOCYTES NFR BLD AUTO: 10.1 % (ref 5–12)
NEUTROPHILS NFR BLD AUTO: 3.15 10*3/MM3 (ref 1.7–7)
NEUTROPHILS NFR BLD AUTO: 66 % (ref 42.7–76)
NRBC BLD AUTO-RTO: 0 /100 WBC (ref 0–0.2)
PLATELET # BLD AUTO: 99 10*3/MM3 (ref 140–450)
PMV BLD AUTO: 11 FL (ref 6–12)
POTASSIUM SERPL-SCNC: 4.1 MMOL/L (ref 3.5–5.2)
PROT SERPL-MCNC: 5.7 G/DL (ref 6–8.5)
RBC # BLD AUTO: 3.31 10*6/MM3 (ref 3.77–5.28)
SODIUM SERPL-SCNC: 139 MMOL/L (ref 136–145)
WBC NRBC COR # BLD AUTO: 4.77 10*3/MM3 (ref 3.4–10.8)

## 2024-08-13 PROCEDURE — 99239 HOSP IP/OBS DSCHRG MGMT >30: CPT | Performed by: STUDENT IN AN ORGANIZED HEALTH CARE EDUCATION/TRAINING PROGRAM

## 2024-08-13 PROCEDURE — 85025 COMPLETE CBC W/AUTO DIFF WBC: CPT | Performed by: STUDENT IN AN ORGANIZED HEALTH CARE EDUCATION/TRAINING PROGRAM

## 2024-08-13 PROCEDURE — 94618 PULMONARY STRESS TESTING: CPT

## 2024-08-13 PROCEDURE — 80053 COMPREHEN METABOLIC PANEL: CPT | Performed by: STUDENT IN AN ORGANIZED HEALTH CARE EDUCATION/TRAINING PROGRAM

## 2024-08-13 PROCEDURE — 25010000002 HEPARIN (PORCINE) PER 1000 UNITS: Performed by: STUDENT IN AN ORGANIZED HEALTH CARE EDUCATION/TRAINING PROGRAM

## 2024-08-13 PROCEDURE — 25010000002 DEXAMETHASONE SODIUM PHOSPHATE 10 MG/ML SOLUTION: Performed by: STUDENT IN AN ORGANIZED HEALTH CARE EDUCATION/TRAINING PROGRAM

## 2024-08-13 RX ORDER — SPIRONOLACTONE 25 MG/1
12.5 TABLET ORAL DAILY
Qty: 30 TABLET | Refills: 0 | Status: SHIPPED | OUTPATIENT
Start: 2024-08-13

## 2024-08-13 RX ORDER — FUROSEMIDE 20 MG/1
20 TABLET ORAL DAILY
Qty: 30 TABLET | Refills: 0 | Status: SHIPPED | OUTPATIENT
Start: 2024-08-13 | End: 2024-08-22 | Stop reason: SDUPTHER

## 2024-08-13 RX ORDER — LEVOTHYROXINE SODIUM 0.15 MG/1
175 TABLET ORAL DAILY
Start: 2024-08-13

## 2024-08-13 RX ORDER — DIGOXIN 125 MCG
125 TABLET ORAL
Qty: 30 TABLET | Refills: 0 | Status: SHIPPED | OUTPATIENT
Start: 2024-08-14

## 2024-08-13 RX ORDER — DEXAMETHASONE 6 MG/1
6 TABLET ORAL DAILY
Qty: 9 TABLET | Refills: 0 | Status: SHIPPED | OUTPATIENT
Start: 2024-08-13 | End: 2024-08-25 | Stop reason: HOSPADM

## 2024-08-13 RX ORDER — FUROSEMIDE 20 MG/1
20 TABLET ORAL DAILY
Status: DISCONTINUED | OUTPATIENT
Start: 2024-08-14 | End: 2024-08-13 | Stop reason: HOSPADM

## 2024-08-13 RX ADMIN — HEPARIN SODIUM 5000 UNITS: 5000 INJECTION INTRAVENOUS; SUBCUTANEOUS at 09:33

## 2024-08-13 RX ADMIN — SACUBITRIL AND VALSARTAN 1 TABLET: 24; 26 TABLET, FILM COATED ORAL at 09:34

## 2024-08-13 RX ADMIN — LOPERAMIDE HYDROCHLORIDE 2 MG: 2 CAPSULE ORAL at 09:35

## 2024-08-13 RX ADMIN — CARVEDILOL 25 MG: 25 TABLET, FILM COATED ORAL at 09:35

## 2024-08-13 RX ADMIN — Medication 10 ML: at 09:36

## 2024-08-13 RX ADMIN — DEXAMETHASONE 6 MG: 4 TABLET ORAL at 09:34

## 2024-08-13 RX ADMIN — DEXAMETHASONE SODIUM PHOSPHATE 6 MG: 10 INJECTION INTRAMUSCULAR; INTRAVENOUS at 09:33

## 2024-08-13 RX ADMIN — SPIRONOLACTONE 12.5 MG: 25 TABLET, FILM COATED ORAL at 09:35

## 2024-08-13 NOTE — DISCHARGE SUMMARY
[facility]      Name:  Sallie Daily   Age:  79 y.o.  Sex:  female  :  1945  MRN:  [id number]   Visit Number:  [id number]    Admission Date:  2024  Date of Discharge:  2024  Primary Care Physician:  Minnie Avila, DO    Important issues to note:    -Heart failure reduced ejection fraction EF 15-20%; start Entresto 24-26 mg daily, spironolactone 25 mg daily, digoxin 125 mcg every other day.  -Recommend less than 1.5 L daily fluid restriction, less than 1500 mg a day sodium restriction.  -Follow-up with cardiology-Dr. Orona in 1 month.  -Follow-up with nephrology.  -Continue Decadron course for COVID-19.  -Requiring 2L with exertion.     Discharge Diagnoses:     Acute respiratory failure with hypoxia  Acute exacerbation of heart failure  COVID-19  CKD  Elevated troponins  Heart failure reduced ejection fraction  Hyperkalemia  Hyperglycemia, resolved     Chronic:  coronary artery disease, CHF, CKD, anemia, arthritis, diverticulosis, hypertension, hyperlipidemia, hypothyroidism, IBD with sacral stimulator, sleep apnea, osteoporosis.    Problem List:     Active Hospital Problems    Diagnosis  POA    **Acute exacerbation of CHF (congestive heart failure) [I50.9]  Yes    Cytokine release syndrome, grade 2 [D89.832]  No      Resolved Hospital Problems   No resolved problems to display.     Presenting Problem:    Chief Complaint   Patient presents with    Shortness of Breath     Pt. Came in via EMS C/O nausea, vomiting, difficulty breathing. She was going to BR when they got there and could barely sit up, Spo2 89%, end tidal of 16, 4L NC went up to 94% with end tidal of 30. Elevated BP in route.      Consults:     Consulting Physician(s)         Provider   Role Specialty     Aung Velarde MD, TAMI      Consulting Physician Nephrology     Arun Donis MD      Consulting Physician Cardiology          Procedures Performed:    none    History of  presenting illness/Hospital Course:    Sallie Daily is a 79-year-old woman with past medical history of coronary artery disease, CHF, CKD, anemia, arthritis, diverticulosis, hypertension, hyperlipidemia, hypothyroidism, IBD with sacral stimulator, sleep apnea, osteoporosis.  Presented to [facility] ED on 8/11/2024 with concern for shortness of air onset night prior to presentation.  Was not able to get off the commode while having a bowel movement shortness of air.  Denied fevers or chills, abdominal pain, productive cough, N/V/D, urinary symptoms, leg pain or swelling.     ED summary: Afebrile, vital signs stable on 4 L.  EKG left bundle branch block similar to previous morphology, nonspecific ST-T wave changes.  Troponins 99, 111, ACS not suspected.  proBNP over 70,000.  Potassium 5.5.  Creatinine 1.23.  Blood glucose 220.  .  COVID-19 positive CXR cardiomegaly and mild pulmonary vascular congestion, left lung base atelectasis or pneumonia.  CT angio chest no evidence of PE, small to moderate bilateral pleural effusions and mild bibasilar atelectasis, small amount of ascites.  She was provided dexamethasone, Lasix.     Inpatient general floor admission 8/11/2024 with acute respiratory failure with hypoxia secondary to suspected elements of acute exacerbation of heart failure as well as COVID-19, elevated troponins ACS not suspected likely demand ischemia, hyperkalemia, hyperglycemia.     Clinically did well during course.  Echocardiogram showed EF 15-20%.  Was assessed by cardiology-Dr. Donis; started on heart failure reduced ejection fraction medications.  Received diuretics per nephrology.  Respiratory status improved.    Stable for discharge home 8/13/2024.    -Heart failure reduced ejection fraction EF 15-20%; start Entresto 24-26 mg daily, spironolactone 25 mg daily, digoxin 125 mcg every other day.  -Recommend less than 1.5 L daily fluid restriction, less than 1500 mg a day sodium  restriction.  -Follow-up with cardiology-Dr. Orona in 1 month.  -Follow-up with nephrology.  -Continue Decadron course for COVID-19.  -Requiring 2L with exertion.     Vital Signs:    Temp:  [97.4 °F (36.3 °C)-98.1 °F (36.7 °C)] 97.5 °F (36.4 °C)  Heart Rate:  [62-75] 63  Resp:  [16-20] 16  BP: (143-172)/(70-99) 161/75    Physical Exam:    General Appearance:  Alert and cooperative.    Head:  Atraumatic and normocephalic.   Eyes: Conjunctivae and sclerae normal, no icterus. No pallor.   Ears:  Ears with no abnormalities noted.   Throat: No oral lesions, no thrush, oral mucosa moist.   Neck: Supple, trachea midline, no thyromegaly.   Back:   No kyphoscoliosis present. No tenderness to palpation.   Lungs:   Breath sounds heard bilaterally equally.  No crackles or wheezing. No Pleural rub or bronchial breathing.   Heart:  Normal S1 and S2, no murmur, no gallop, no rub. No JVD.   Abdomen:   Normal bowel sounds, no masses, no organomegaly. Soft, nontender, nondistended, no rebound tenderness.   Extremities: Supple, no edema, no cyanosis, no clubbing.   Pulses: Pulses palpable bilaterally.   Skin: Warm.   Neurologic: Alert and oriented x 3. No facial asymmetry. Moves all four limbs. No tremors.     Pertinent Lab Results:     Results from last 7 days   Lab Units 08/13/24  0545 08/12/24  0601 08/11/24  1153   SODIUM mmol/L 139 140 138   POTASSIUM mmol/L 4.1 4.4 5.5*   CHLORIDE mmol/L 104 106 103   CO2 mmol/L 20.5* 21.1* 16.1*   BUN mg/dL 43* 35* 22   CREATININE mg/dL 1.24* 1.22* 1.23*   CALCIUM mg/dL 9.3 9.0 9.2   BILIRUBIN mg/dL 0.4 0.3 0.9   ALK PHOS U/L 70 87 115   ALT (SGPT) U/L 23 27 32   AST (SGOT) U/L 51* 64* 105*   GLUCOSE mg/dL 83 97 220*     Results from last 7 days   Lab Units 08/13/24  0545 08/12/24  0601 08/11/24  1153   WBC 10*3/mm3 4.77 5.25 8.40   HEMOGLOBIN g/dL 11.2* 11.0* 13.1   HEMATOCRIT % 32.4* 31.5* 39.7   PLATELETS 10*3/mm3 99* 103* 136*         Results from last 7 days   Lab Units 08/11/24  1405  08/11/24  1153   HSTROP T ng/L 111* 99*     Results from last 7 days   Lab Units 08/11/24  1153   PROBNP pg/mL >70,000.0*                       Pertinent Radiology Results:    Imaging Results (All)       Procedure Component Value Units Date/Time    CT Angiogram Chest Pulmonary Embolism [[id number]] Collected: 08/11/24 1356     Updated: 08/11/24 1401    Narrative:      PROCEDURE: CT ANGIOGRAM CHEST PULMONARY EMBOLISM-     HISTORY: Hypoxic, COVID-positive, eval PE; J96.01-Acute respiratory  failure with hypoxia        COMPARISON: None     FINDINGS: Thin section axial CT images of the chest were obtained with  contrast. 3D reformatted images were also obtained. This study was  performed with techniques to keep radiation doses as low as reasonably  achievable, (ALARA). Individualized dose reduction techniques using  automated exposure control or adjustment of mA and/or kV according to  the patient size were employed.     There is no evidence of pulmonary embolism. The thoracic aorta is not  well opacified but there is no evidence of aneurysm or dissection.  Multiple mildly enlarged mediastinal nodes are seen which are  nonspecific but favor reactive. No mediastinal mass is identified. There  is mild bibasilar atelectasis. There are small to moderate bilateral  pleural effusions. There is no evidence of pneumothorax. No focal chest  wall abnormality is identified.     Limited images of the upper abdomen reveal a small amount of ascites.          Impression:         No evidence of pulmonary embolism.     Small to moderate bilateral pleural effusions and mild bibasilar  atelectasis.     Small amount of ascites.              CTDI: 5.32 mGy  DLP:173.42 mGy.cm        This report was signed and finalized on 8/11/2024 1:59 PM by Fer Agarwal MD.       XR Chest 1 View [[id number]] Collected: 08/11/24 1234     Updated: 08/11/24 1237    Narrative:      PROCEDURE: XR CHEST 1 VW-     HISTORY: SOA; J96.01-Acute respiratory failure  with hypoxia        COMPARISON: None.     FINDINGS: Cardiomegaly is noted. The mediastinum is normal. There is  mild pulmonary vascular congestion. Left lung base opacities are seen  consistent with atelectasis or pneumonia. There is no pneumothorax. The  bony thorax in intact. A soft tissue calcification is seen adjacent to  the left humeral head.       Impression:      Cardiomegaly and mild pulmonary vascular congestion.     Left lung base atelectasis or pneumonia.           This report was signed and finalized on 8/11/2024 12:35 PM by Fer Agarwal MD.               Echo:    Results for orders placed during the hospital encounter of 08/11/24    Adult Transthoracic Echo Complete w/ Color, Spectral and Contrast if necessary per protocol    Interpretation Summary    Left ventricular systolic function is severely decreased. Left ventricular ejection fraction appears to 15-20%. The left ventricular cavity is mildly dilated. Grade I diastolic dysfunction present.    Regional wall motion abnormalities as below.  No left ventricular thrombus noted.    Normal right ventricular size with borderline systolic function.    The right atrial cavity is mild to moderately  dilated.    Mild mitral valve regurgitation is present.    Mild to moderate tricuspid valve regurgitation is present. Estimated right ventricular systolic pressure from tricuspid regurgitation is markedly elevated (>55 mmHg).    There is a small left pleural effusion.    Condition on Discharge:      Stable.    Code status during the hospital stay:    Code Status and Medical Interventions: No CPR (Do Not Attempt to Resuscitate); Limited Support; No intubation (DNI)   Ordered at: 08/11/24 1611     Medical Intervention Limits:    No intubation (DNI)     Code Status (Patient has no pulse and is not breathing):    No CPR (Do Not Attempt to Resuscitate)     Medical Interventions (Patient has pulse or is breathing):    Limited Support     Discharge  Disposition:    Home or Self Care    Discharge Medications:       Discharge Medications        New Medications        Instructions Start Date   dexAMETHasone 6 MG tablet  Commonly known as: DECADRON   6 mg, Oral, Daily      digoxin 125 MCG tablet  Commonly known as: LANOXIN   125 mcg, Oral, Every 48 Hours   Start Date: August 14, 2024     furosemide 20 MG tablet  Commonly known as: Lasix   20 mg, Oral, Daily      sacubitril-valsartan 24-26 MG tablet  Commonly known as: ENTRESTO   1 tablet, Oral, Every 12 Hours Scheduled      spironolactone 25 MG tablet  Commonly known as: ALDACTONE   12.5 mg, Oral, Daily             Changes to Medications        Instructions Start Date   levothyroxine 150 MCG tablet  Commonly known as: SYNTHROID, LEVOTHROID  What changed: how much to take   150 mcg, Oral, Daily             Continue These Medications        Instructions Start Date   carvedilol 25 MG tablet  Commonly known as: COREG   TAKE 1 TABLET BY MOUTH  TWICE DAILY WITH MEALS      chlorthalidone 25 MG tablet  Commonly known as: HYGROTON   25 mg, Oral, Daily      Cholecalciferol 125 MCG (5000 UT) tablet   Vitamin D3 125 mcg (5,000 unit) tablet   Take by oral route.      Diclofenac Sodium 1 % gel gel  Commonly known as: VOLTAREN   4 g, Topical, 4 Times Daily PRN      levocetirizine 5 MG tablet  Commonly known as: XYZAL   1 tablet, Oral, Daily      loperamide 2 MG capsule  Commonly known as: IMODIUM   2 mg, Oral, 4 Times Daily PRN      Melatonin 10 MG capsule   melatonin 10 mg capsule   Take 1 capsule every day by oral route.      omeprazole 20 MG capsule  Commonly known as: priLOSEC   1 capsule, Oral, Daily      pramipexole 0.5 MG tablet  Commonly known as: MIRAPEX   0.5 mg, Oral, Every 12 Hours Scheduled      sertraline 100 MG tablet  Commonly known as: ZOLOFT   Zoloft 100 mg tablet   Take 1 tablet every day by oral route.      tiZANidine 4 MG tablet  Commonly known as: ZANAFLEX   tizanidine 4 mg tablet             Stop These  Medications      hydrALAZINE 100 MG tablet  Commonly known as: APRESOLINE     predniSONE 20 MG tablet  Commonly known as: DELTASONE     valsartan 320 MG tablet  Commonly known as: DIOVAN            Discharge Diet:     Diet Instructions       Diet: Cardiac Diets, Fluid Restriction (240 mL/tray) Diets; No Salt Packet; Regular (IDDSI 7); Thin (IDDSI 0); 1500 mL/day      Discharge Diet:  Cardiac Diets  Fluid Restriction (240 mL/tray) Diets       Cardiac Diet: No Salt Packet    Texture: Regular (IDDSI 7)    Fluid Consistency: Thin (IDDSI 0)    Fluid Restriction Diet (240 mL/tray): 1500 mL/day    Less than 1500mg/day salt          Activity at Discharge:     Activity Instructions       Activity as Tolerated            Follow-up Appointments:     Follow-up Information       Minnie Avila DO Follow up in 3 day(s).    Specialty: Family Medicine  Why: Hospital Follow up on Thursday August 15, 2024 at 8:00AM.  Contact information:  [address]  [facility] [address]  [phone]               Jonh Orona MD Follow up in 1 month(s).    Specialties: Cardiology, Interventional Cardiology  Why: Hospital Follow up on September 13, 2024 at 11:00AM with Yasmine VANEGAS.  Contact information:  [address]  [facility] [address]  [phone]               Aung Velarde MD, FASN Follow up in 2 week(s).    Specialty: Nephrology  Why: Hospital Follow up on Tuesday August 27, 2024 at 2:15PM.  Contact information:  [address] [place]  [facility] [address]  [phone]                           Future Appointments   Date Time Provider Department Center   8/15/2024  8:00 AM Minnie Avila DO [facility]   9/13/2024 11:00 AM Yasmine Watson APRN [facility]     Test Results Pending at Discharge:           Brian Joseph Kerley, DO  08/13/24  09:11 EDT    Time: I spent 35 minutes on this discharge activity which included: face-to-face encounter with the patient, reviewing the data in  the system, coordination of the care with the nursing staff as well as consultants, documentation, and entering orders.     Dictated utilizing Dragon dictation.

## 2024-08-13 NOTE — PROGRESS NOTES
Exercise Oximetry    Patient Name:Sallie Daily   MRN: [id number]   Date: 08/13/24             ROOM AIR BASELINE   SpO2%              91   Heart Rate            72   Blood Pressure      EXERCISE ON ROOM AIR SpO2% EXERCISE ON O2 @ LPM SpO2%   1 MINUTE 89 1 MINUTE    2 MINUTES 88 2 MINUTES    3 MINUTES  3 MINUTES                      2  90   4 MINUTES  4 MINUTES 92   5 MINUTES  5 MINUTES 93   6 MINUTES  6 MINUTES 94              Distance Walked   Distance Walked   Dyspnea (Maribel Scale)   Dyspnea (Maribel Scale)     6   Fatigue (Maribel Scale)   Fatigue (Maribel Scale)           6   SpO2% Post Exercise   SpO2% Post Exercise          97   HR Post Exercise   HR Post Exercise             73   Time to Recovery   Time to Recovery     Comments: pt does not require o2 at rest. However, requires nc 2lpm while ambulating.

## 2024-08-13 NOTE — PLAN OF CARE
Problem: Adult Inpatient Plan of Care  Goal: Plan of Care Review  Outcome: Ongoing, Progressing  Flowsheets (Taken 8/13/2024 0137)  Progress: no change  Plan of Care Reviewed With: patient  Goal: Patient-Specific Goal (Individualized)  Outcome: Ongoing, Progressing  Goal: Absence of Hospital-Acquired Illness or Injury  Outcome: Ongoing, Progressing  Intervention: Identify and Manage Fall Risk  Recent Flowsheet Documentation  Taken 8/13/2024 0000 by Julio Angulo RN  Safety Promotion/Fall Prevention:   activity supervised   assistive device/personal items within reach   fall prevention program maintained   nonskid shoes/slippers when out of bed   safety round/check completed  Taken 8/12/2024 2200 by Julio Angulo RN  Safety Promotion/Fall Prevention:   activity supervised   assistive device/personal items within reach   fall prevention program maintained   nonskid shoes/slippers when out of bed   safety round/check completed  Taken 8/12/2024 2000 by Julio Angulo RN  Safety Promotion/Fall Prevention:   activity supervised   assistive device/personal items within reach   fall prevention program maintained   nonskid shoes/slippers when out of bed   safety round/check completed  Intervention: Prevent Skin Injury  Recent Flowsheet Documentation  Taken 8/13/2024 0000 by Julio Angulo RN  Body Position: position changed independently  Taken 8/12/2024 2200 by Julio Angulo RN  Body Position: position changed independently  Taken 8/12/2024 2000 by Julio Angulo RN  Body Position: position changed independently  Intervention: Prevent and Manage VTE (Venous Thromboembolism) Risk  Recent Flowsheet Documentation  Taken 8/13/2024 0000 by Julio Angulo, RN  Activity Management: activity encouraged  Taken 8/12/2024 2200 by Julio Angulo RN  Activity Management: activity encouraged  Taken 8/12/2024 2000 by Julio Angulo, RN  Activity Management: activity encouraged  Intervention: Prevent Infection  Recent  Flowsheet Documentation  Taken 8/13/2024 0000 by Julio Angulo RN  Infection Prevention:   environmental surveillance performed   equipment surfaces disinfected   single patient room provided   hand hygiene promoted   rest/sleep promoted  Taken 8/12/2024 2200 by Julio Angulo RN  Infection Prevention:   environmental surveillance performed   equipment surfaces disinfected   single patient room provided   hand hygiene promoted   rest/sleep promoted  Taken 8/12/2024 2000 by Julio Angulo RN  Infection Prevention:   environmental surveillance performed   equipment surfaces disinfected   single patient room provided   hand hygiene promoted   rest/sleep promoted  Goal: Optimal Comfort and Wellbeing  Outcome: Ongoing, Progressing  Goal: Readiness for Transition of Care  Outcome: Ongoing, Progressing     Problem: Obstructive Sleep Apnea Risk or Actual Comorbidity Management  Goal: Unobstructed Breathing During Sleep  Outcome: Ongoing, Progressing     Problem: Skin Injury Risk Increased  Goal: Skin Health and Integrity  Outcome: Ongoing, Progressing  Intervention: Optimize Skin Protection  Recent Flowsheet Documentation  Taken 8/13/2024 0000 by Julio Angulo RN  Head of Bed (HOB) Positioning: HOB at 20 degrees  Taken 8/12/2024 2200 by Julio Angulo RN  Head of Bed (HOB) Positioning: HOB at 20 degrees  Taken 8/12/2024 2000 by Julio Angulo RN  Head of Bed (HOB) Positioning: HOB at 20 degrees     Problem: Gas Exchange Impaired  Goal: Optimal Gas Exchange  Outcome: Ongoing, Progressing  Intervention: Optimize Oxygenation and Ventilation  Recent Flowsheet Documentation  Taken 8/13/2024 0000 by Julio Angulo RN  Head of Bed (HOB) Positioning: HOB at 20 degrees  Taken 8/12/2024 2200 by Julio Angulo RN  Head of Bed (HOB) Positioning: HOB at 20 degrees  Taken 8/12/2024 2000 by Julio Angulo RN  Head of Bed (HOB) Positioning: HOB at 20 degrees   Goal Outcome Evaluation:  Plan of Care Reviewed With:  patient        Progress: no change

## 2024-08-13 NOTE — CASE MANAGEMENT/SOCIAL WORK
Case Management Discharge Note                Selected Continued Care - Discharged on 8/13/2024 Admission date: 8/11/2024 - Discharge disposition: Home or Self Care      Destination    No services have been selected for the patient.                Durable Medical Equipment Coordination complete.      Service Provider Selected Services Address Phone Fax Patient Preferred    [facility] [address] [place] [address] [address] WILL[address]SON [address] [place] [address] [phone] --              Dialysis/Infusion    No services have been selected for the patient.                Home Medical Care    No services have been selected for the patient.                Therapy    No services have been selected for the patient.                Community Resources    No services have been selected for the patient.                Community & DME    No services have been selected for the patient.                    Transportation Services  Private: Car    Final Discharge Disposition Code: 01 - home or self-care

## 2024-08-13 NOTE — OUTREACH NOTE
Prep Survey      Flowsheet Row Responses   [facility] patient discharged from? [place]   Is LACE score < 7 ? No   Eligibility [facility]   Date of Admission 08/11/24   Date of Discharge 08/13/24   Discharge Disposition Home or Self Care   Discharge diagnosis Acute exacerbation of CHF (congestive heart failure)   Does the patient have one of the following disease processes/diagnoses(primary or secondary)? CHF   Does the patient have Home health ordered? No   Is there a DME ordered? Yes   What DME was ordered? Oxygen Therapy--[facility]   Medication alerts for this patient see AVS   Prep survey completed? Yes            Anna Marie ALVAREZ - Registered Nurse

## 2024-08-13 NOTE — PROGRESS NOTES
[facility] Progress Note  [facility]. [place]        Patient Name: Sallie Daily  : 1945  MRN: [id number]   LOS: 2 days    Patient Care Team:  Minnie Avila DO as PCP - General (Family Medicine)    Chief Complaint:    Chief Complaint   Patient presents with    Shortness of Breath     Pt. Came in via EMS C/O nausea, vomiting, difficulty breathing. She was going to BR when they got there and could barely sit up, Spo2 89%, end tidal of 16, 4L NC went up to 94% with end tidal of 30. Elevated BP in route.     Primary Care Physician:  Minnie Avila DO  Date of admission: 2024    Subjective     Interval History:   Follow-up acute on chronic kidney disease stage IIIa.  she is awake in bed, eating breakfast. Her oxygen demands have lessened. She denies CP or SOA at rest. She reports trouble sleeping and restless legs overnight.   Events noted from last 24 hours.  I reviewed the chart and other providers notes, labs and procedures done since my last note.    Review of Systems:   As noted above.    Objective     Vitals:   Temp:  [97.4 °F (36.3 °C)-98.1 °F (36.7 °C)] 97.5 °F (36.4 °C)  Heart Rate:  [62-75] 63  Resp:  [16-20] 16  BP: (143-172)/(70-99) 161/75  Flow (L/min):  [2] 2    Intake/Output Summary (Last 24 hours) at 2024 0754  Last data filed at 2024 2130  Gross per 24 hour   Intake 1320 ml   Output 1300 ml   Net 20 ml       Physical Exam:    Constitutional: Awake, alert  Eyes: sclerae anicteric, no conjunctival injection  HEENT: mucous membranes dry  Neck: Supple, no thyromegaly, no lymphadenopathy, trachea midline, No JVD  Respiratory: mild bibasilar rhonchi, nonlabored respirations   Cardiovascular: RRR, 1/6 to 2/6 systolic murmurs, no rubs, or gallops.  Gastrointestinal: Positive bowel sounds, obese, soft, nontender, nondistended  Musculoskeletal: No edema, no clubbing or cyanosis  Psychiatric: Appropriate affect, cooperative  Neurologic:  Oriented x 3, moving all extremities, Cranial Nerves grossly intact, speech clear  Skin: warm and dry, no rashes     Scheduled Meds:     Current Facility-Administered Medications   Medication Dose Route Frequency Provider Last Rate Last Admin    acetaminophen (TYLENOL) tablet 650 mg  650 mg Oral Q4H PRN Kerley, Brian Joseph, DO   650 mg at 08/12/24 1020    Or    acetaminophen (TYLENOL) 160 MG/5ML oral solution 650 mg  650 mg Oral Q4H PRN Kerley, Brian Joseph, DO        Or    acetaminophen (TYLENOL) suppository 650 mg  650 mg Rectal Q4H PRN Kerley, Brian Joseph, DO        Calcium Replacement - Follow Nurse / BPA Driven Protocol   Does not apply PRN Kerley, Brian Joseph, DO        carvedilol (COREG) tablet 25 mg  25 mg Oral BID With Meals Arun Donis MD   25 mg at 08/12/24 1828    dexAMETHasone (DECADRON) tablet 6 mg  6 mg Oral Daily Kerley, Brian Joseph, DO   6 mg at 08/12/24 0918    Or    dexAMETHasone sodium phosphate injection 6 mg  6 mg Intravenous Daily Kerley, Brian Joseph, DO        digoxin (LANOXIN) tablet 125 mcg  125 mcg Oral Q48H Arun Donis MD   125 mcg at 08/12/24 1641    furosemide (LASIX) injection 40 mg  40 mg Intravenous BID Kerley, Brian Joseph, DO   40 mg at 08/12/24 1828    heparin (porcine) 5000 UNIT/ML injection 5,000 Units  5,000 Units Subcutaneous Q12H Kerley, Brian Joseph, DO   5,000 Units at 08/12/24 2020    loperamide (IMODIUM) capsule 2 mg  2 mg Oral BID Kerley, Brian Joseph, DO   2 mg at 08/12/24 2020    Magnesium Standard Dose Replacement - Follow Nurse / BPA Driven Protocol   Does not apply PRN Kerley, Brian Joseph, DO        nitroglycerin (NITROSTAT) SL tablet 0.4 mg  0.4 mg Sublingual Q5 Min PRN Kerley, Brian Joseph, DO        ondansetron (ZOFRAN) injection 4 mg  4 mg Intravenous Q6H PRN Kerley, Brian Joseph, DO        Pharmacy Consult - Remdesivir for Severe COVID-19 (Within 7 days of symptom onset)   Does not apply Continuous PRN Kerley, Brian Joseph, DO         Phosphorus Replacement - Follow Nurse / BPA Driven Protocol   Does not apply PRN Kerley, Brian Joseph, DO        Potassium Replacement - Follow Nurse / BPA Driven Protocol   Does not apply PRN Kerley, Brian Joseph, DO        remdesivir 100 mg in sodium chloride 0.9 % 270 mL IVPB (powder vial)  100 mg Intravenous Q24H Kerley, Brian Joseph, DO   100 mg at 08/12/24 1828    sacubitril-valsartan (ENTRESTO) 24-26 MG tablet 1 tablet  1 tablet Oral Q12H Arun Donis MD   1 tablet at 08/12/24 2020    sodium chloride 0.9 % flush 10 mL  10 mL Intravenous Q12H Kerley, Brian Joseph, DO   10 mL at 08/12/24 2021    sodium chloride 0.9 % flush 10 mL  10 mL Intravenous PRN Kerley, Brian Joseph, DO        sodium chloride 0.9 % infusion 40 mL  40 mL Intravenous PRN Kerley, Brian Joseph, DO        spironolactone (ALDACTONE) tablet 12.5 mg  12.5 mg Oral Daily Arun Donis MD   12.5 mg at 08/12/24 1641       carvedilol, 25 mg, Oral, BID With Meals  dexAMETHasone, 6 mg, Oral, Daily   Or  dexAMETHasone, 6 mg, Intravenous, Daily  digoxin, 125 mcg, Oral, Q48H  furosemide, 40 mg, Intravenous, BID  heparin (porcine), 5,000 Units, Subcutaneous, Q12H  loperamide, 2 mg, Oral, BID  remdesivir, 100 mg, Intravenous, Q24H  sacubitril-valsartan, 1 tablet, Oral, Q12H  sodium chloride, 10 mL, Intravenous, Q12H  spironolactone, 12.5 mg, Oral, Daily        IV Meds:   Pharmacy Consult - Remdesivir,         Results Reviewed:   I have personally reviewed the results from the time of this admission to 8/13/2024 07:54 EDT     Results from last 7 days   Lab Units 08/13/24  0545 08/12/24  0601 08/11/24  1153   SODIUM mmol/L 139 140 138   POTASSIUM mmol/L 4.1 4.4 5.5*   CHLORIDE mmol/L 104 106 103   CO2 mmol/L 20.5* 21.1* 16.1*   BUN mg/dL 43* 35* 22   CREATININE mg/dL 1.24* 1.22* 1.23*   CALCIUM mg/dL 9.3 9.0 9.2   BILIRUBIN mg/dL 0.4 0.3 0.9   ALK PHOS U/L 70 87 115   ALT (SGPT) U/L 23 27 32   AST (SGOT) U/L 51* 64* 105*   GLUCOSE mg/dL 83 97 220*  "      Estimated Creatinine Clearance: 30.9 mL/min (A) (by C-G formula based on SCr of 1.24 mg/dL (H)).    Results from last 7 days   Lab Units 08/11/24  1153   MAGNESIUM mg/dL 1.9             Results from last 7 days   Lab Units 08/13/24  0545 08/12/24  0601 08/11/24  1153   WBC 10*3/mm3 4.77 5.25 8.40   HEMOGLOBIN g/dL 11.2* 11.0* 13.1   PLATELETS 10*3/mm3 99* 103* 136*             Brief Urine Lab Results       None            No results found for: \"UTPCR\"    Imaging Results (Last 24 Hours)       ** No results found for the last 24 hours. **                Assessment / Plan     ASSESSMENT:    Acute exacerbation of CHF (congestive heart failure)    CKD IIIa: Evidence of chronic renal impairment per chart review, sCr baseline around 1.2 since 2021. Initial sCr 1.23 on presentation which is at her baseline. No evidence of acute kidney injury although she did receive IV contrast 08/11. Etiology of CKD unclear will check urine studies.  She did mention that she follows up with the nephrologist in [place] and was told that she may have mild chronic kidney disease.  CHF: with acute exacerbation. Initial proBNP >70k, pulmonary vascular congestion on chest imaging. ECHO pending. Optimize volume status and medications.  Cardiology has been consulted.  Acute hypoxemic respiratory failure: intially hypoxic, requiring supplemental oxygen. Etiology r/t acute hypervolemia, complicated by possible PNA, + COVID-19. She is getting IV steroids and remdesivir.  Hypertension: re-introduce home meds as tolerated. Will restart coreg and hydralazine at lower doses and monitor response. Entresto was started by cardiology.      PLAN:    Currently electrolytes and waste products stable. Renal function is at baseline.  Likely getting discharged today, plans to stop IV diuretics and transition to low dose loop PO, most likely in the next few days.  She has been eating and drinking fairly well.  No cardiac intervention was done yesterday.  " Medical management as per cardiology.  Details were discussed with the patient no family in the room.  She will need follow-up with us in 2 weeks.  Details were also discussed with the hospitalist service and or other providers as needed.   Continue with rest of the current treatment plan, and monitor with surveillance labs.  Further recommendations will depend on clinical course of the patient during the current hospitalization.   I have reviewed the copied text to this note, it was edited and the changes made as needed.  It is accurate to the point, when the note was signed today.     Thank you for involving us in the care of Sallie Daily.  Please feel free to call with any questions.    Aung Velarde MD, FASN  08/13/24  07:54 EDT    [facility]  [phone]      Part of this note may be an electronic transcription/translation of spoken language to printed text using the Dragon Dictation System.

## 2024-08-14 ENCOUNTER — TRANSITIONAL CARE MANAGEMENT TELEPHONE ENCOUNTER (OUTPATIENT)
Dept: CALL CENTER | Facility: HOSPITAL | Age: 79
End: 2024-08-14
Payer: MEDICARE

## 2024-08-14 NOTE — OUTREACH NOTE
Call Center TCM Note      Flowsheet Row Responses   [facility] patient discharged from? [facility]   Does the patient have one of the following disease processes/diagnoses(primary or secondary)? CHF   TCM attempt successful? Yes   Call start time 1728   Call end time 1744   Meds reviewed with patient/caregiver? Yes   Is the patient having any side effects they believe may be caused by any medication additions or changes? No   Does the patient have all medications ordered at discharge? Yes   Is the patient taking all medications as directed (includes completed medication regime)? Yes   Medication comments States has all medications needed, but has some questions about her medications. States plans to discuss with her PCP. Will route message to PCP office.   Comments PCP hospital f/u appt 08/20/24, within TCM time frame. Cardiology appt 09/13/24, has nephrology appt also.   Does the patient have an appointment with their PCP within 7-14 days of discharge? Yes   Has home health visited the patient within 72 hours of discharge? N/A   Has all DME been delivered? Yes   DME comments States has home O2 to use PRN.   Pulse Ox monitoring None   Psychosocial issues? No   Did the patient receive a copy of their discharge instructions? Yes   Nursing interventions Reviewed instructions with patient   What is the patient's perception of their health status since discharge? Same   Nursing interventions Nurse provided patient education   Is the patient able to teach back signs and symptoms of worsening condition? (i.e. weight gain, shortness of air, etc.) Yes   If the patient is a current smoker, are they able to teach back resources for cessation? Not a smoker   Is the patient/caregiver able to teach back the hierarchy of who to call/visit for symptoms/problems? PCP, Specialist, Home health nurse, Urgent Care, ED, 911 Yes   Is the patient able to teach back Heart Failure Zones? No   CHF Zone this Call Yellow Zone   Yellow Zone  Not feeling as good as usual   Yellow Zone Interventions Consider contacting your doctor or health care team   TCM call completed? Yes   Wrap up additional comments Patient states is feeling about the same. States still very tired, and has slept more than usual today. States believes is due to not sleeping while hospitalized. States has lost 8#. Denies any edema, worsening SOA, or chest pain. States has questions about her medications, and will discuss with PCP. Will route message to PCP office. Denies any needs today. TCM complete.   Call end time 1744   Would this patient benefit from a Referral to [facility] Social Work? No   Is the patient interested in additional calls from an ambulatory ? No             Shell Kemp RN    8/14/2024, 17:46 EDT

## 2024-08-14 NOTE — OUTREACH NOTE
Call Center TCM Note      Flowsheet Row Responses   [facility] patient discharged from? [facility]   Does the patient have one of the following disease processes/diagnoses(primary or secondary)? CHF   TCM attempt successful? No  [ requests to call back this afternoon.]   Unsuccessful attempts Attempt 1   Call Status Left message             Shell Kemp RN    8/14/2024, 10:53 EDT

## 2024-08-18 NOTE — TELEPHONE ENCOUNTER
Caller: [place], [place]    Relationship: Self    Best call back number: [phone]    What form or medical record are you requesting: NEW PATIENT PAPERWORK    Who is requesting this form or medical record from you: SELF    How would you like to receive the form or medical records (pick-up, mail, fax): [employer]    Timeframe paperwork needed: AS SOON AS POSSIBLE    Additional notes: PATIENT IS REQUESTING NEW PATIENT PAPERWORK VIA [email or web address], IF POSSIBLE.     THANK YOU.      stated

## 2024-08-19 ENCOUNTER — TELEPHONE (OUTPATIENT)
Dept: INTERNAL MEDICINE | Facility: CLINIC | Age: 79
End: 2024-08-19
Payer: MEDICARE

## 2024-08-19 NOTE — TELEPHONE ENCOUNTER
"Lvm for patient.    Relay     \" Provider out of office due to illness. Please reschedule or offer telehealth.\"        "

## 2024-08-22 ENCOUNTER — TELEMEDICINE (OUTPATIENT)
Dept: INTERNAL MEDICINE | Facility: CLINIC | Age: 79
End: 2024-08-22
Payer: MEDICARE

## 2024-08-22 ENCOUNTER — READMISSION MANAGEMENT (OUTPATIENT)
Dept: CALL CENTER | Facility: HOSPITAL | Age: 79
End: 2024-08-22
Payer: MEDICARE

## 2024-08-22 DIAGNOSIS — J90 PLEURAL EFFUSION: ICD-10-CM

## 2024-08-22 DIAGNOSIS — J18.9 COMMUNITY ACQUIRED PNEUMONIA OF LEFT LOWER LOBE OF LUNG: Primary | ICD-10-CM

## 2024-08-22 RX ORDER — FUROSEMIDE 20 MG/1
20 TABLET ORAL DAILY
Qty: 90 TABLET | Refills: 0 | Status: SHIPPED | OUTPATIENT
Start: 2024-08-22

## 2024-08-22 RX ORDER — CEFDINIR 300 MG/1
300 CAPSULE ORAL 2 TIMES DAILY
Qty: 14 CAPSULE | Refills: 0 | Status: SHIPPED | OUTPATIENT
Start: 2024-08-22 | End: 2024-08-25 | Stop reason: HOSPADM

## 2024-08-22 RX ORDER — GUAIFENESIN 600 MG/1
1200 TABLET, EXTENDED RELEASE ORAL 2 TIMES DAILY
Qty: 30 TABLET | Refills: 0 | Status: SHIPPED | OUTPATIENT
Start: 2024-08-22

## 2024-08-22 RX ORDER — AZITHROMYCIN 250 MG/1
TABLET, FILM COATED ORAL
Qty: 6 TABLET | Refills: 0 | Status: SHIPPED | OUTPATIENT
Start: 2024-08-22 | End: 2024-08-25 | Stop reason: HOSPADM

## 2024-08-22 NOTE — PROGRESS NOTES
"Chief Complaint  Hospital Follow Up Visit    Subjective           Sallie Daily presents to [facility]  History of Present Illness  Pt presents for hospital f/u for arf due to covid and pleural effusions. She is still having soa. Having to use 2l oxygen as needed. Pulse ox has been running between 89-94. She was prescribed lasix but had not been taking it as the first time she took it she urinated on herself. However, her daughter came over to see her today and said pt sounded like she was drowning. Daughter made her take lasix, and pt has improved. Pt still having very bad fatigue.     Objective   Vital Signs:   There were no vitals taken for this visit.    Estimated body mass index is 27.86 kg/m² as calculated from the following:    Height as of 8/12/24: 152.4 cm (60\").    Weight as of 8/13/24: 64.7 kg (142 lb 10.2 oz).     Physical Exam   Constitutional: She appears well-developed and well-nourished.   HENT:   Head: Normocephalic and atraumatic.   Eyes: Conjunctivae are normal.   Neck: Neck normal appearance.  Pulmonary/Chest: Effort normal.   Neurological: She is alert.   Psychiatric: She has a normal mood and affect.     Result Review :                   Assessment and Plan      Diagnoses and all orders for this visit:    1. Community acquired pneumonia of left lower lobe of lung (Primary)  -     cefdinir (OMNICEF) 300 MG capsule; Take 1 capsule by mouth 2 (Two) Times a Day.  Dispense: 14 capsule; Refill: 0  -     azithromycin (Zithromax Z-Alejandro) 250 MG tablet; Take 2 tablets by mouth on day 1, then 1 tablet daily on days 2-5  Dispense: 6 tablet; Refill: 0  -     guaiFENesin (Mucinex) 600 MG 12 hr tablet; Take 2 tablets by mouth 2 (Two) Times a Day.  Dispense: 30 tablet; Refill: 0    2. Pleural effusion  -     furosemide (Lasix) 20 MG tablet; Take 1 tablet by mouth Daily.  Dispense: 90 tablet; Refill: 0    Counseled to take lasix for soa to help get fluid off of lungs.  Xray " showed lll pna, so will dual treat with omnicef azithro, due to pt kidney function decision made not to use levaquin at this time    Follow Up     Return in about 4 weeks (around 9/19/2024).  Patient was given instructions and counseling regarding her condition or for health maintenance advice. Please see specific information pulled into the AVS if appropriate.     Mode of Visit: Video  Location of patient: home  Location of provider: home  You have chosen to receive care through a telehealth visit.  Does the patient consent to use a video/audio connection for your medical care today? Yes  The visit included audio and video interaction. No technical issues occurred during this visit.

## 2024-08-22 NOTE — OUTREACH NOTE
CHF Week 2 Survey      Flowsheet Row Responses   [facility] patient discharged from? [facility]   Does the patient have one of the following disease processes/diagnoses(primary or secondary)? CHF   Week 2 attempt successful? Yes   Call start time 0837   Call end time 0844   Discharge diagnosis Acute exacerbation of CHF (congestive heart failure)   Does the patient have all medications ordered at discharge? Yes   Is the patient taking all medications as directed (includes completed medication regime)? Yes   Medication comments  reports pt taking medications as ordered   Does the patient have a primary care provider?  Yes   Does the patient have an appointment with their PCP within 7 days of discharge? Greater than 7 days   Nursing Interventions Verified appointment date/time/provider  [appt 8/22/24]   Has the patient kept scheduled appointments due by today? Yes   Has home health visited the patient within 72 hours of discharge? N/A   What DME was ordered? Oxygen Therapy--[facility]   Has all DME been delivered? Yes   DME comments O2 orders indicate continous usage, pt using cpap at hs   Pulse Ox monitoring Intermittent   Pulse Ox device source Patient   O2 Sat comments  reports sats 95% with 2 lpm nc, education provided about sat readings   O2 Sat: education provided Sat levels   Psychosocial issues? No   Did the patient receive a copy of their discharge instructions? Yes   Nursing interventions Reviewed instructions with patient   What is the patient's perception of their health status since discharge? Same  [ reports no new or worsening s/s.  uncertain if pt is weighing qd.  Advised to weigh qd and notify if 2-3# daily gain or 5# weekly gain, edema to feet/ankles, increase in SOA.]   Nursing interventions Nurse provided patient education   Is the patient able to teach back signs and symptoms of worsening condition? (i.e. weight gain, shortness of air, etc.) Yes   Is the  patient/caregiver able to teach back the hierarchy of who to call/visit for symptoms/problems? PCP, Specialist, Home health nurse, Urgent Care, ED, 911 No   Is the patient able to teach back Heart Failure Zones? No   CHF Week 2 call completed? Yes   Revoked No further contact(revokes)-requires comment  [no immediates needs,  tcm f/u 8/22/24 and cardio f/u on 9/13/24]   Call end time 0844            EMEKA MARTINEZ - Registered Nurse

## 2024-08-23 ENCOUNTER — APPOINTMENT (OUTPATIENT)
Dept: CT IMAGING | Facility: HOSPITAL | Age: 79
DRG: 194 | End: 2024-08-23
Payer: MEDICARE

## 2024-08-23 ENCOUNTER — HOSPITAL ENCOUNTER (INPATIENT)
Facility: HOSPITAL | Age: 79
LOS: 2 days | Discharge: HOME HEALTH | DRG: 194 | End: 2024-08-25
Attending: STUDENT IN AN ORGANIZED HEALTH CARE EDUCATION/TRAINING PROGRAM | Admitting: FAMILY MEDICINE
Payer: MEDICARE

## 2024-08-23 ENCOUNTER — APPOINTMENT (OUTPATIENT)
Dept: GENERAL RADIOLOGY | Facility: HOSPITAL | Age: 79
DRG: 194 | End: 2024-08-23
Payer: MEDICARE

## 2024-08-23 DIAGNOSIS — U07.1 COVID-19: ICD-10-CM

## 2024-08-23 DIAGNOSIS — R11.2 NAUSEA, VOMITING, AND DIARRHEA: ICD-10-CM

## 2024-08-23 DIAGNOSIS — R50.9 FEVER, UNSPECIFIED FEVER CAUSE: Primary | ICD-10-CM

## 2024-08-23 DIAGNOSIS — R19.7 NAUSEA, VOMITING, AND DIARRHEA: ICD-10-CM

## 2024-08-23 DIAGNOSIS — R53.1 GENERALIZED WEAKNESS: ICD-10-CM

## 2024-08-23 PROBLEM — J18.9 COMMUNITY ACQUIRED PNEUMONIA: Status: ACTIVE | Noted: 2024-08-23

## 2024-08-23 LAB
ALBUMIN SERPL-MCNC: 3.4 G/DL (ref 3.5–5.2)
ALBUMIN/GLOB SERPL: 1.1 G/DL
ALP SERPL-CCNC: 50 U/L (ref 39–117)
ALT SERPL W P-5'-P-CCNC: 11 U/L (ref 1–33)
ANION GAP SERPL CALCULATED.3IONS-SCNC: 11.6 MMOL/L (ref 5–15)
AST SERPL-CCNC: 25 U/L (ref 1–32)
B PARAPERT DNA SPEC QL NAA+PROBE: NOT DETECTED
B PERT DNA SPEC QL NAA+PROBE: NOT DETECTED
BASOPHILS # BLD AUTO: 0.02 10*3/MM3 (ref 0–0.2)
BASOPHILS NFR BLD AUTO: 0.2 % (ref 0–1.5)
BILIRUB SERPL-MCNC: 0.4 MG/DL (ref 0–1.2)
BUN SERPL-MCNC: 54 MG/DL (ref 8–23)
BUN/CREAT SERPL: 40.9 (ref 7–25)
C PNEUM DNA NPH QL NAA+NON-PROBE: NOT DETECTED
CALCIUM SPEC-SCNC: 9.1 MG/DL (ref 8.6–10.5)
CHLORIDE SERPL-SCNC: 100 MMOL/L (ref 98–107)
CO2 SERPL-SCNC: 23.4 MMOL/L (ref 22–29)
CREAT SERPL-MCNC: 1.32 MG/DL (ref 0.57–1)
D-LACTATE SERPL-SCNC: 0.8 MMOL/L (ref 0.5–2)
DEPRECATED RDW RBC AUTO: 47.7 FL (ref 37–54)
DIGOXIN SERPL-MCNC: 0.78 NG/ML (ref 0.6–1.2)
EGFRCR SERPLBLD CKD-EPI 2021: 41.2 ML/MIN/1.73
EOSINOPHIL # BLD AUTO: 0.01 10*3/MM3 (ref 0–0.4)
EOSINOPHIL NFR BLD AUTO: 0.1 % (ref 0.3–6.2)
ERYTHROCYTE [DISTWIDTH] IN BLOOD BY AUTOMATED COUNT: 13.3 % (ref 12.3–15.4)
FLUAV SUBTYP SPEC NAA+PROBE: NOT DETECTED
FLUBV RNA ISLT QL NAA+PROBE: NOT DETECTED
GEN 5 2HR TROPONIN T REFLEX: 80 NG/L
GLOBULIN UR ELPH-MCNC: 3.1 GM/DL
GLUCOSE SERPL-MCNC: 113 MG/DL (ref 65–99)
HADV DNA SPEC NAA+PROBE: NOT DETECTED
HCOV 229E RNA SPEC QL NAA+PROBE: NOT DETECTED
HCOV HKU1 RNA SPEC QL NAA+PROBE: NOT DETECTED
HCOV NL63 RNA SPEC QL NAA+PROBE: NOT DETECTED
HCOV OC43 RNA SPEC QL NAA+PROBE: NOT DETECTED
HCT VFR BLD AUTO: 40.2 % (ref 34–46.6)
HGB BLD-MCNC: 14.2 G/DL (ref 12–15.9)
HMPV RNA NPH QL NAA+NON-PROBE: NOT DETECTED
HPIV1 RNA ISLT QL NAA+PROBE: NOT DETECTED
HPIV2 RNA SPEC QL NAA+PROBE: NOT DETECTED
HPIV3 RNA NPH QL NAA+PROBE: NOT DETECTED
HPIV4 P GENE NPH QL NAA+PROBE: NOT DETECTED
IMM GRANULOCYTES # BLD AUTO: 0.11 10*3/MM3 (ref 0–0.05)
IMM GRANULOCYTES NFR BLD AUTO: 1.1 % (ref 0–0.5)
LYMPHOCYTES # BLD AUTO: 0.77 10*3/MM3 (ref 0.7–3.1)
LYMPHOCYTES NFR BLD AUTO: 7.8 % (ref 19.6–45.3)
M PNEUMO IGG SER IA-ACNC: NOT DETECTED
MCH RBC QN AUTO: 34.1 PG (ref 26.6–33)
MCHC RBC AUTO-ENTMCNC: 35.3 G/DL (ref 31.5–35.7)
MCV RBC AUTO: 96.6 FL (ref 79–97)
MONOCYTES # BLD AUTO: 0.61 10*3/MM3 (ref 0.1–0.9)
MONOCYTES NFR BLD AUTO: 6.2 % (ref 5–12)
NEUTROPHILS NFR BLD AUTO: 8.33 10*3/MM3 (ref 1.7–7)
NEUTROPHILS NFR BLD AUTO: 84.6 % (ref 42.7–76)
NRBC BLD AUTO-RTO: 0 /100 WBC (ref 0–0.2)
NT-PROBNP SERPL-MCNC: ABNORMAL PG/ML (ref 0–1800)
PLATELET # BLD AUTO: 98 10*3/MM3 (ref 140–450)
PMV BLD AUTO: 11.6 FL (ref 6–12)
POTASSIUM SERPL-SCNC: 4.5 MMOL/L (ref 3.5–5.2)
PROT SERPL-MCNC: 6.5 G/DL (ref 6–8.5)
RBC # BLD AUTO: 4.16 10*6/MM3 (ref 3.77–5.28)
RHINOVIRUS RNA SPEC NAA+PROBE: NOT DETECTED
RSV RNA NPH QL NAA+NON-PROBE: NOT DETECTED
SARS-COV-2 RNA NPH QL NAA+NON-PROBE: DETECTED
SODIUM SERPL-SCNC: 135 MMOL/L (ref 136–145)
TROPONIN T DELTA: 6 NG/L
TROPONIN T SERPL HS-MCNC: 74 NG/L
WBC NRBC COR # BLD AUTO: 9.85 10*3/MM3 (ref 3.4–10.8)

## 2024-08-23 PROCEDURE — 25010000002 ONDANSETRON PER 1 MG: Performed by: STUDENT IN AN ORGANIZED HEALTH CARE EDUCATION/TRAINING PROGRAM

## 2024-08-23 PROCEDURE — 0202U NFCT DS 22 TRGT SARS-COV-2: CPT | Performed by: STUDENT IN AN ORGANIZED HEALTH CARE EDUCATION/TRAINING PROGRAM

## 2024-08-23 PROCEDURE — 93005 ELECTROCARDIOGRAM TRACING: CPT | Performed by: STUDENT IN AN ORGANIZED HEALTH CARE EDUCATION/TRAINING PROGRAM

## 2024-08-23 PROCEDURE — 85025 COMPLETE CBC W/AUTO DIFF WBC: CPT | Performed by: STUDENT IN AN ORGANIZED HEALTH CARE EDUCATION/TRAINING PROGRAM

## 2024-08-23 PROCEDURE — 70450 CT HEAD/BRAIN W/O DYE: CPT

## 2024-08-23 PROCEDURE — 83880 ASSAY OF NATRIURETIC PEPTIDE: CPT | Performed by: STUDENT IN AN ORGANIZED HEALTH CARE EDUCATION/TRAINING PROGRAM

## 2024-08-23 PROCEDURE — 71275 CT ANGIOGRAPHY CHEST: CPT

## 2024-08-23 PROCEDURE — 25510000001 IOPAMIDOL 61 % SOLUTION: Performed by: STUDENT IN AN ORGANIZED HEALTH CARE EDUCATION/TRAINING PROGRAM

## 2024-08-23 PROCEDURE — 36415 COLL VENOUS BLD VENIPUNCTURE: CPT

## 2024-08-23 PROCEDURE — 25010000002 CEFTRIAXONE PER 250 MG: Performed by: STUDENT IN AN ORGANIZED HEALTH CARE EDUCATION/TRAINING PROGRAM

## 2024-08-23 PROCEDURE — 84145 PROCALCITONIN (PCT): CPT | Performed by: STUDENT IN AN ORGANIZED HEALTH CARE EDUCATION/TRAINING PROGRAM

## 2024-08-23 PROCEDURE — 25810000003 SODIUM CHLORIDE 0.9 % SOLUTION: Performed by: STUDENT IN AN ORGANIZED HEALTH CARE EDUCATION/TRAINING PROGRAM

## 2024-08-23 PROCEDURE — 84484 ASSAY OF TROPONIN QUANT: CPT | Performed by: STUDENT IN AN ORGANIZED HEALTH CARE EDUCATION/TRAINING PROGRAM

## 2024-08-23 PROCEDURE — 87040 BLOOD CULTURE FOR BACTERIA: CPT | Performed by: STUDENT IN AN ORGANIZED HEALTH CARE EDUCATION/TRAINING PROGRAM

## 2024-08-23 PROCEDURE — 80053 COMPREHEN METABOLIC PANEL: CPT | Performed by: STUDENT IN AN ORGANIZED HEALTH CARE EDUCATION/TRAINING PROGRAM

## 2024-08-23 PROCEDURE — 71045 X-RAY EXAM CHEST 1 VIEW: CPT

## 2024-08-23 PROCEDURE — 74177 CT ABD & PELVIS W/CONTRAST: CPT

## 2024-08-23 PROCEDURE — 83605 ASSAY OF LACTIC ACID: CPT | Performed by: STUDENT IN AN ORGANIZED HEALTH CARE EDUCATION/TRAINING PROGRAM

## 2024-08-23 PROCEDURE — 80162 ASSAY OF DIGOXIN TOTAL: CPT | Performed by: STUDENT IN AN ORGANIZED HEALTH CARE EDUCATION/TRAINING PROGRAM

## 2024-08-23 PROCEDURE — 99285 EMERGENCY DEPT VISIT HI MDM: CPT

## 2024-08-23 RX ORDER — IOPAMIDOL 612 MG/ML
85 INJECTION, SOLUTION INTRAVASCULAR
Status: COMPLETED | OUTPATIENT
Start: 2024-08-23 | End: 2024-08-23

## 2024-08-23 RX ORDER — ACETAMINOPHEN 325 MG/1
975 TABLET ORAL ONCE
Status: COMPLETED | OUTPATIENT
Start: 2024-08-23 | End: 2024-08-23

## 2024-08-23 RX ORDER — SODIUM CHLORIDE 0.9 % (FLUSH) 0.9 %
10 SYRINGE (ML) INJECTION AS NEEDED
Status: DISCONTINUED | OUTPATIENT
Start: 2024-08-23 | End: 2024-08-25 | Stop reason: HOSPADM

## 2024-08-23 RX ORDER — ONDANSETRON 2 MG/ML
4 INJECTION INTRAMUSCULAR; INTRAVENOUS ONCE
Status: COMPLETED | OUTPATIENT
Start: 2024-08-23 | End: 2024-08-23

## 2024-08-23 RX ADMIN — IOPAMIDOL 85 ML: 612 INJECTION, SOLUTION INTRAVENOUS at 21:25

## 2024-08-23 RX ADMIN — SODIUM CHLORIDE 500 ML: 9 INJECTION, SOLUTION INTRAVENOUS at 20:38

## 2024-08-23 RX ADMIN — CEFTRIAXONE 1000 MG: 1 INJECTION, POWDER, FOR SOLUTION INTRAMUSCULAR; INTRAVENOUS at 23:24

## 2024-08-23 RX ADMIN — ONDANSETRON 4 MG: 2 INJECTION INTRAMUSCULAR; INTRAVENOUS at 20:40

## 2024-08-23 RX ADMIN — ACETAMINOPHEN 975 MG: 325 TABLET, FILM COATED ORAL at 20:45

## 2024-08-24 LAB
BACTERIA UR QL AUTO: ABNORMAL /HPF
BILIRUB UR QL STRIP: NEGATIVE
CLARITY UR: CLEAR
COLOR UR: YELLOW
GLUCOSE UR STRIP-MCNC: NEGATIVE MG/DL
HGB UR QL STRIP.AUTO: NEGATIVE
HYALINE CASTS UR QL AUTO: ABNORMAL /LPF
KETONES UR QL STRIP: NEGATIVE
LEUKOCYTE ESTERASE UR QL STRIP.AUTO: ABNORMAL
NITRITE UR QL STRIP: NEGATIVE
PH UR STRIP.AUTO: <=5 [PH] (ref 5–8)
PROCALCITONIN SERPL-MCNC: 0.11 NG/ML (ref 0–0.25)
PROT UR QL STRIP: ABNORMAL
RBC # UR STRIP: ABNORMAL /HPF
REF LAB TEST METHOD: ABNORMAL
SP GR UR STRIP: 1.02 (ref 1–1.03)
SQUAMOUS #/AREA URNS HPF: ABNORMAL /HPF
UROBILINOGEN UR QL STRIP: ABNORMAL
WBC # UR STRIP: ABNORMAL /HPF

## 2024-08-24 PROCEDURE — 81001 URINALYSIS AUTO W/SCOPE: CPT | Performed by: FAMILY MEDICINE

## 2024-08-24 PROCEDURE — 97162 PT EVAL MOD COMPLEX 30 MIN: CPT

## 2024-08-24 PROCEDURE — 99223 1ST HOSP IP/OBS HIGH 75: CPT | Performed by: FAMILY MEDICINE

## 2024-08-24 PROCEDURE — 25010000002 HEPARIN (PORCINE) PER 1000 UNITS: Performed by: FAMILY MEDICINE

## 2024-08-24 RX ORDER — BISACODYL 5 MG/1
5 TABLET, DELAYED RELEASE ORAL DAILY PRN
Status: DISCONTINUED | OUTPATIENT
Start: 2024-08-24 | End: 2024-08-25 | Stop reason: HOSPADM

## 2024-08-24 RX ORDER — DOXYCYCLINE 100 MG/1
100 CAPSULE ORAL EVERY 12 HOURS SCHEDULED
Status: DISCONTINUED | OUTPATIENT
Start: 2024-08-24 | End: 2024-08-25 | Stop reason: HOSPADM

## 2024-08-24 RX ORDER — DIGOXIN 125 MCG
125 TABLET ORAL
Status: DISCONTINUED | OUTPATIENT
Start: 2024-08-24 | End: 2024-08-24

## 2024-08-24 RX ORDER — PRAMIPEXOLE DIHYDROCHLORIDE 0.25 MG/1
0.5 TABLET ORAL NIGHTLY
Status: DISCONTINUED | OUTPATIENT
Start: 2024-08-24 | End: 2024-08-25 | Stop reason: HOSPADM

## 2024-08-24 RX ORDER — SODIUM CHLORIDE 9 MG/ML
40 INJECTION, SOLUTION INTRAVENOUS AS NEEDED
Status: DISCONTINUED | OUTPATIENT
Start: 2024-08-24 | End: 2024-08-25 | Stop reason: HOSPADM

## 2024-08-24 RX ORDER — SODIUM CHLORIDE 0.9 % (FLUSH) 0.9 %
10 SYRINGE (ML) INJECTION EVERY 12 HOURS SCHEDULED
Status: DISCONTINUED | OUTPATIENT
Start: 2024-08-24 | End: 2024-08-25 | Stop reason: HOSPADM

## 2024-08-24 RX ORDER — ONDANSETRON 4 MG/1
4 TABLET, ORALLY DISINTEGRATING ORAL EVERY 6 HOURS PRN
Status: DISCONTINUED | OUTPATIENT
Start: 2024-08-24 | End: 2024-08-25 | Stop reason: HOSPADM

## 2024-08-24 RX ORDER — ACETAMINOPHEN 160 MG/5ML
650 SOLUTION ORAL EVERY 4 HOURS PRN
Status: DISCONTINUED | OUTPATIENT
Start: 2024-08-24 | End: 2024-08-25 | Stop reason: HOSPADM

## 2024-08-24 RX ORDER — ALUMINA, MAGNESIA, AND SIMETHICONE 2400; 2400; 240 MG/30ML; MG/30ML; MG/30ML
15 SUSPENSION ORAL EVERY 6 HOURS PRN
Status: DISCONTINUED | OUTPATIENT
Start: 2024-08-24 | End: 2024-08-25 | Stop reason: HOSPADM

## 2024-08-24 RX ORDER — AMOXICILLIN 250 MG
2 CAPSULE ORAL 2 TIMES DAILY PRN
Status: DISCONTINUED | OUTPATIENT
Start: 2024-08-24 | End: 2024-08-25 | Stop reason: HOSPADM

## 2024-08-24 RX ORDER — BISACODYL 10 MG
10 SUPPOSITORY, RECTAL RECTAL DAILY PRN
Status: DISCONTINUED | OUTPATIENT
Start: 2024-08-24 | End: 2024-08-25 | Stop reason: HOSPADM

## 2024-08-24 RX ORDER — ACETAMINOPHEN 325 MG/1
650 TABLET ORAL EVERY 4 HOURS PRN
Status: DISCONTINUED | OUTPATIENT
Start: 2024-08-24 | End: 2024-08-25 | Stop reason: HOSPADM

## 2024-08-24 RX ORDER — SODIUM CHLORIDE 0.9 % (FLUSH) 0.9 %
10 SYRINGE (ML) INJECTION AS NEEDED
Status: DISCONTINUED | OUTPATIENT
Start: 2024-08-24 | End: 2024-08-25 | Stop reason: HOSPADM

## 2024-08-24 RX ORDER — CARVEDILOL 25 MG/1
25 TABLET ORAL 2 TIMES DAILY WITH MEALS
Status: DISCONTINUED | OUTPATIENT
Start: 2024-08-24 | End: 2024-08-25 | Stop reason: HOSPADM

## 2024-08-24 RX ORDER — HEPARIN SODIUM 5000 [USP'U]/ML
5000 INJECTION, SOLUTION INTRAVENOUS; SUBCUTANEOUS EVERY 12 HOURS SCHEDULED
Status: DISCONTINUED | OUTPATIENT
Start: 2024-08-24 | End: 2024-08-25 | Stop reason: HOSPADM

## 2024-08-24 RX ORDER — NITROGLYCERIN 0.4 MG/1
0.4 TABLET SUBLINGUAL
Status: DISCONTINUED | OUTPATIENT
Start: 2024-08-24 | End: 2024-08-25 | Stop reason: HOSPADM

## 2024-08-24 RX ORDER — CHLORTHALIDONE 25 MG/1
25 TABLET ORAL DAILY
Status: DISCONTINUED | OUTPATIENT
Start: 2024-08-24 | End: 2024-08-25 | Stop reason: HOSPADM

## 2024-08-24 RX ORDER — SPIRONOLACTONE 25 MG/1
12.5 TABLET ORAL DAILY
Status: DISCONTINUED | OUTPATIENT
Start: 2024-08-24 | End: 2024-08-25 | Stop reason: HOSPADM

## 2024-08-24 RX ORDER — ONDANSETRON 2 MG/ML
4 INJECTION INTRAMUSCULAR; INTRAVENOUS EVERY 6 HOURS PRN
Status: DISCONTINUED | OUTPATIENT
Start: 2024-08-24 | End: 2024-08-25 | Stop reason: HOSPADM

## 2024-08-24 RX ORDER — POLYETHYLENE GLYCOL 3350 17 G/17G
17 POWDER, FOR SOLUTION ORAL DAILY PRN
Status: DISCONTINUED | OUTPATIENT
Start: 2024-08-24 | End: 2024-08-25 | Stop reason: HOSPADM

## 2024-08-24 RX ORDER — ACETAMINOPHEN 650 MG/1
650 SUPPOSITORY RECTAL EVERY 4 HOURS PRN
Status: DISCONTINUED | OUTPATIENT
Start: 2024-08-24 | End: 2024-08-25 | Stop reason: HOSPADM

## 2024-08-24 RX ORDER — FUROSEMIDE 20 MG
20 TABLET ORAL DAILY
Status: DISCONTINUED | OUTPATIENT
Start: 2024-08-24 | End: 2024-08-25 | Stop reason: HOSPADM

## 2024-08-24 RX ADMIN — SPIRONOLACTONE 12.5 MG: 25 TABLET, FILM COATED ORAL at 09:12

## 2024-08-24 RX ADMIN — HEPARIN SODIUM 5000 UNITS: 5000 INJECTION INTRAVENOUS; SUBCUTANEOUS at 21:05

## 2024-08-24 RX ADMIN — CHLORTHALIDONE 25 MG: 25 TABLET ORAL at 08:57

## 2024-08-24 RX ADMIN — FUROSEMIDE 20 MG: 20 TABLET ORAL at 08:57

## 2024-08-24 RX ADMIN — Medication 10 ML: at 00:32

## 2024-08-24 RX ADMIN — Medication 10 ML: at 08:59

## 2024-08-24 RX ADMIN — CARVEDILOL 25 MG: 25 TABLET, FILM COATED ORAL at 18:15

## 2024-08-24 RX ADMIN — SACUBITRIL AND VALSARTAN 1 TABLET: 24; 26 TABLET, FILM COATED ORAL at 21:06

## 2024-08-24 RX ADMIN — LEVOTHYROXINE SODIUM 175 MCG: 0.15 TABLET ORAL at 10:27

## 2024-08-24 RX ADMIN — Medication 10 ML: at 21:15

## 2024-08-24 RX ADMIN — DOXYCYCLINE 100 MG: 100 CAPSULE ORAL at 21:04

## 2024-08-24 RX ADMIN — DOXYCYCLINE 100 MG: 100 INJECTION, POWDER, LYOPHILIZED, FOR SOLUTION INTRAVENOUS at 00:32

## 2024-08-24 RX ADMIN — CARVEDILOL 25 MG: 25 TABLET, FILM COATED ORAL at 08:57

## 2024-08-24 RX ADMIN — PRAMIPEXOLE DIHYDROCHLORIDE 0.5 MG: 0.25 TABLET ORAL at 00:33

## 2024-08-24 RX ADMIN — SACUBITRIL AND VALSARTAN 1 TABLET: 24; 26 TABLET, FILM COATED ORAL at 00:33

## 2024-08-24 RX ADMIN — SACUBITRIL AND VALSARTAN 1 TABLET: 24; 26 TABLET, FILM COATED ORAL at 09:12

## 2024-08-24 RX ADMIN — DOXYCYCLINE 100 MG: 100 CAPSULE ORAL at 13:29

## 2024-08-24 RX ADMIN — PRAMIPEXOLE DIHYDROCHLORIDE 0.5 MG: 0.25 TABLET ORAL at 21:12

## 2024-08-24 RX ADMIN — HEPARIN SODIUM 5000 UNITS: 5000 INJECTION INTRAVENOUS; SUBCUTANEOUS at 02:10

## 2024-08-24 RX ADMIN — SERTRALINE 100 MG: 50 TABLET, FILM COATED ORAL at 08:57

## 2024-08-24 NOTE — PLAN OF CARE
Goal Outcome Evaluation:  Plan of Care Reviewed With: patient        Progress: improving  Outcome Evaluation: VSS. Pt on 2 liters of oxygen.

## 2024-08-24 NOTE — ED PROVIDER NOTES
[facility] EMERGENCY DEPARTMENT  Emergency Department Encounter  Emergency Medicine Physician Note       Pt Name: Sallie Daily  MRN: [id number]  Pt :   1945  Room Number:  10/10  Date of encounter:  2024  PCP: Minnie Avila DO  ED Physician: Se Andrea DO    HPI:  Sallie Daily is a 79 y.o. female who presents to the ED with chief complaint of generalized weakness.  Patient is companied by her daughter contributes to HPI.  Gradual onset of symptoms over the past several days.  Patient complains of severe nausea, vomiting, diarrhea. Experiencing generalized weakness to the point that patient is falling asleep when talking to family members.  She saw her PCP yesterday.  Was started on Z-Alejandro and Omnicef for presumed pneumonia.  She was also recently hospitalized for COVID-19 and CHF estimation.  Wears oxygen at baseline.  Reports associated fever.  Denies chest pain or shortness of breath, abdominal pain, problems with urination, bloody stools. Patient's daughter also reports intermittent episodes of confusion.    PAST MEDICAL HISTORY  Past Medical History:   Diagnosis Date    Abnormal ECG 2021    Post neck surgery    Allergic Sulfa    Anemia     Arthritis     Asthma 24    Couldn’t breathe    Cataract Removed    Cellulitis     CHF (congestive heart failure)     Chronic kidney disease 2002    Colon polyp     Coronary artery disease     Congestion after neck surgery led to pneumonia which led to heart “heart attack”    Diverticulitis     Diverticulosis Several    GERD (gastroesophageal reflux disease)     Gout     Heart attack     3/2021  mi    HL (hearing loss)     Hyperlipidemia     Hypertension     Hyperthyroidism Caught early    Inflammatory bowel disease [employer] device    Low back pain     Obesity     Osteoporosis     Pneumonia     Renal insufficiency     Sleep apnea 2010    Thyroid disease     Urinary tract infection Once    Visual  impairment Nearsighted     Current Outpatient Medications   Medication Instructions    azithromycin (Zithromax Z-Alejandro) 250 MG tablet Take 2 tablets by mouth on day 1, then 1 tablet daily on days 2-5    carvedilol (COREG) 25 MG tablet TAKE 1 TABLET BY MOUTH  TWICE DAILY WITH MEALS    cefdinir (OMNICEF) 300 mg, Oral, 2 Times Daily    chlorthalidone (HYGROTON) 25 mg, Oral, Daily    Cholecalciferol 125 MCG (5000 UT) tablet Vitamin D3 125 mcg (5,000 unit) tablet   Take by oral route.    Diclofenac Sodium (VOLTAREN) 4 g, Topical, 4 Times Daily PRN    digoxin (LANOXIN) 125 mcg, Oral, Every 48 Hours    furosemide (LASIX) 20 mg, Oral, Daily    guaiFENesin (MUCINEX) 1,200 mg, Oral, 2 Times Daily    levocetirizine (XYZAL) 5 MG tablet 1 tablet, Oral, Daily    levothyroxine (SYNTHROID, LEVOTHROID) 150 mcg, Oral, Daily    loperamide (IMODIUM) 2 mg, Oral, 4 Times Daily PRN    Melatonin 10 MG capsule melatonin 10 mg capsule   Take 1 capsule every day by oral route.    omeprazole (priLOSEC) 20 MG capsule 1 capsule, Oral, Daily    pramipexole (MIRAPEX) 0.5 mg, Oral, Every 12 Hours Scheduled    sacubitril-valsartan (ENTRESTO) 24-26 MG tablet 1 tablet, Oral, Every 12 Hours Scheduled    sertraline (ZOLOFT) 100 MG tablet Zoloft 100 mg tablet   Take 1 tablet every day by oral route.    spironolactone (ALDACTONE) 12.5 mg, Oral, Daily    tiZANidine (ZANAFLEX) 4 MG tablet tizanidine 4 mg tablet      PAST SURGICAL HISTORY  Past Surgical History:   Procedure Laterality Date    CAROTID ENDARTERECTOMY  2011    COLON SURGERY  1.5 ft removed    COLONOSCOPY      2/2022, q1yr , [facility]    EYE SURGERY  Cataract/lens    HERNIA REPAIR  5 times    HYSTERECTOMY  1990 ?    JOINT REPLACEMENT  Knee, rotator cuff x 2    NECK SURGERY      REPLACEMENT TOTAL KNEE      ROTATOR CUFF REPAIR      SINUS SURGERY  2021    SPINE SURGERY  Upper spine    TONSILLECTOMY  1950 ?    TUBAL ABDOMINAL LIGATION         FAMILY HISTORY  Family History   Problem Relation  Age of Onset    Thyroid disease Mother     Osteoporosis Mother     Obesity Mother     Heart attack Mother     Arthritis Mother         Age 98 as of 2024    Obesity Father     Heart attack Father     Cancer Father          2019 at age. 89+, had Parkinson’s disease    Thyroid disease Sister     Thyroid disease Sister     Thyroid disease Daughter        SOCIAL HISTORY  Social History     Socioeconomic History    Marital status:    Tobacco Use    Smoking status: Former     Current packs/day: 0.00     Average packs/day: 1 pack/day for 42.1 years (42.1 ttl pk-yrs)     Types: Cigarettes     Start date: 10/1/1963     Quit date: 2005     Years since quittin.7    Smokeless tobacco: Never   Vaping Use    Vaping status: Never Used   Substance and Sexual Activity    Alcohol use: Not Currently     Comment: Glass of wine or beer occasionally    Drug use: Never    Sexual activity: Not Currently     Partners: Male     Birth control/protection: Tubal ligation, Birth control pill, Hysterectomy, Pill     ALLERGIES  Sulfa antibiotics    REVIEW OF SYSTEMS  All systems reviewed and negative except for those discussed in HPI.     PHYSICAL EXAM  ED Triage Vitals [24]   Temp Heart Rate Resp BP SpO2   (!) 100.6 °F (38.1 °C) 82 14 162/75 91 %      Temp src Heart Rate Source Patient Position BP Location FiO2 (%)   Oral Monitor Sitting Right arm --     I have reviewed the triage vital signs and nursing notes.    General: Alert.  Ill-appearing, but nontoxic.  No acute distress.  Head: Normocephalic.  Atraumatic.  Eyes: No scleral icterus.  ENT: Moist mucous membranes.  Cardiovascular: Regular rate and rhythm.  No murmurs.  No rubs.  2+ distal pulses bilaterally.  Respiratory: Equal breath sounds bilaterally.  No rales.  No rhonchi.  No wheezing.  GI: Abdomen is soft.  Nondistended.  Nontender to palpation.  No rebound.  No guarding.  No CVA tenderness.  MSK: Moves all 4 extremities.  Neurologic:  Oriented x 3.  No focal deficits.  Skin: No erythema.  No edema. No pallor. No cyanosis.  Psych: Normal mood and affect.    LAB RESULTS  Recent Results (from the past 24 hour(s))   Comprehensive Metabolic Panel    Collection Time: 08/23/24  7:48 PM    Specimen: Blood   Result Value Ref Range    Glucose 113 (H) 65 - 99 mg/dL    BUN 54 (H) 8 - 23 mg/dL    Creatinine 1.32 (H) 0.57 - 1.00 mg/dL    Sodium 135 (L) 136 - 145 mmol/L    Potassium 4.5 3.5 - 5.2 mmol/L    Chloride 100 98 - 107 mmol/L    CO2 23.4 22.0 - 29.0 mmol/L    Calcium 9.1 8.6 - 10.5 mg/dL    Total Protein 6.5 6.0 - 8.5 g/dL    Albumin 3.4 (L) 3.5 - 5.2 g/dL    ALT (SGPT) 11 1 - 33 U/L    AST (SGOT) 25 1 - 32 U/L    Alkaline Phosphatase 50 39 - 117 U/L    Total Bilirubin 0.4 0.0 - 1.2 mg/dL    Globulin 3.1 gm/dL    A/G Ratio 1.1 g/dL    BUN/Creatinine Ratio 40.9 (H) 7.0 - 25.0    Anion Gap 11.6 5.0 - 15.0 mmol/L    eGFR 41.2 (L) >60.0 mL/min/1.73   High Sensitivity Troponin T    Collection Time: 08/23/24  7:48 PM    Specimen: Blood   Result Value Ref Range    HS Troponin T 74 (C) <14 ng/L   Digoxin Level    Collection Time: 08/23/24  7:48 PM    Specimen: Blood   Result Value Ref Range    Digoxin 0.78 0.60 - 1.20 ng/mL   CBC Auto Differential    Collection Time: 08/23/24  7:48 PM    Specimen: Blood   Result Value Ref Range    WBC 9.85 3.40 - 10.80 10*3/mm3    RBC 4.16 3.77 - 5.28 10*6/mm3    Hemoglobin 14.2 12.0 - 15.9 g/dL    Hematocrit 40.2 34.0 - 46.6 %    MCV 96.6 79.0 - 97.0 fL    MCH 34.1 (H) 26.6 - 33.0 pg    MCHC 35.3 31.5 - 35.7 g/dL    RDW 13.3 12.3 - 15.4 %    RDW-SD 47.7 37.0 - 54.0 fl    MPV 11.6 6.0 - 12.0 fL    Platelets 98 (L) 140 - 450 10*3/mm3    Neutrophil % 84.6 (H) 42.7 - 76.0 %    Lymphocyte % 7.8 (L) 19.6 - 45.3 %    Monocyte % 6.2 5.0 - 12.0 %    Eosinophil % 0.1 (L) 0.3 - 6.2 %    Basophil % 0.2 0.0 - 1.5 %    Immature Grans % 1.1 (H) 0.0 - 0.5 %    Neutrophils, Absolute 8.33 (H) 1.70 - 7.00 10*3/mm3    Lymphocytes, Absolute 0.77  0.70 - 3.10 10*3/mm3    Monocytes, Absolute 0.61 0.10 - 0.90 10*3/mm3    Eosinophils, Absolute 0.01 0.00 - 0.40 10*3/mm3    Basophils, Absolute 0.02 0.00 - 0.20 10*3/mm3    Immature Grans, Absolute 0.11 (H) 0.00 - 0.05 10*3/mm3    nRBC 0.0 0.0 - 0.2 /100 WBC   BNP    Collection Time: 08/23/24  7:48 PM    Specimen: Blood   Result Value Ref Range    proBNP 17,372.0 (H) 0.0 - 1,800.0 pg/mL   Respiratory Panel PCR w/COVID-19(SARS-CoV-2) ERIN/ALICIA/SUPA/PAD/COR/DINORA In-House, NP Swab in UTM/VTM, 2 HR TAT - Swab, Nasopharynx    Collection Time: 08/23/24  8:42 PM    Specimen: Nasopharynx; Swab   Result Value Ref Range    ADENOVIRUS, PCR Not Detected Not Detected    Coronavirus 229E Not Detected Not Detected    Coronavirus HKU1 Not Detected Not Detected    Coronavirus NL63 Not Detected Not Detected    Coronavirus OC43 Not Detected Not Detected    COVID19 Detected (C) Not Detected - Ref. Range    Human Metapneumovirus Not Detected Not Detected    Human Rhinovirus/Enterovirus Not Detected Not Detected    Influenza A PCR Not Detected Not Detected    Influenza B PCR Not Detected Not Detected    Parainfluenza Virus 1 Not Detected Not Detected    Parainfluenza Virus 2 Not Detected Not Detected    Parainfluenza Virus 3 Not Detected Not Detected    Parainfluenza Virus 4 Not Detected Not Detected    RSV, PCR Not Detected Not Detected    Bordetella pertussis pcr Not Detected Not Detected    Bordetella parapertussis PCR Not Detected Not Detected    Chlamydophila pneumoniae PCR Not Detected Not Detected    Mycoplasma pneumo by PCR Not Detected Not Detected   High Sensitivity Troponin T 2Hr    Collection Time: 08/23/24 10:21 PM    Specimen: Arm, Left; Blood   Result Value Ref Range    HS Troponin T 80 (C) <14 ng/L    Troponin T Delta 6 (C) >=-4 - <+4 ng/L       RADIOLOGY  CT Abdomen Pelvis With Contrast    Result Date: 8/23/2024  FINAL REPORT TECHNIQUE: null CLINICAL HISTORY: Fever, NVD, AMS, weakness COMPARISON: null FINDINGS: Exam: CT  Abdomen and Pelvis with contrast Comparison: None Clinical history: Fever, NVD, AMS, weakness Findings: Small hiatal hernia The liver, spleen and pancreas do not demonstrate any acute process. No gallstones or evidence for acute cholecystitis No choledocholithiasis or biliary obstruction. Normal adrenal glands. No renal calculi or hydronephrosis. Small renal vascular calcification noted. No ureteral calculi or hydroureter. Symmetric enhancement of the kidneys bilaterally. No abdominal aortic aneurysm. No small bowel obstruction Appendix is normal in caliber Colonic diverticulosis without diverticulitis. Moderate to large colonic fecal load. Urinary bladder does not demonstrate stones or wall thickening. Prior hysterectomy. Prior left lower quadrant abdominal wall hernia repair No free fluid in the pelvis.     Impression: 1. Colonic diverticulosis without CT evidence for acute diverticulitis. Moderate to large colonic fecal load. Normal appendix. Authenticated and Electronically Signed by Pau Duffy MD on 08/23/2024 09:50:17 PM    CT Angiogram Chest Pulmonary Embolism    Result Date: 8/23/2024  FINAL REPORT TECHNIQUE: null CLINICAL HISTORY: Fever, AMS, hypoxia, weakness COMPARISON: null FINDINGS: Exam: CTA Chest with IV contrast. Procedure: Coronal and sagittal MIP reformats were performed Comparison: 08/11/2024 Clinical history: Fever, altered mental status, hypoxia, weakness Findings: Limited due to patient motion artifact. No pulmonary emboli. No thoracic aortic aneurysm or dissection. Atherosclerotic calcifications are seen at the aorta and coronary arteries. No hilar or mediastinal adenopathy. Cardiac size is enlarged. No significant pericardial fluid collection. No pneumothorax. No pleural fluid collection. Previously seen bilateral pleural fluid collections have resolved. Pulmonary ground-glass opacities seen in the lungs bilaterally may represent pulmonary edema or atypical pneumonia. Small hiatal  hernia No thoracic spine compression fractures     Impression: 1. No pulmonary emboli. 2. No thoracic aortic aneurysm or dissection 3. Bilateral pulmonary ground-glass opacities may represent pulmonary edema or atypical pneumonia. 4. Previously seen bilateral pleural fluid collections have resolved. Authenticated and Electronically Signed by Pau Duffy MD on 08/23/2024 09:49:02 PM    CT Head Without Contrast    Result Date: 8/23/2024  FINAL REPORT TECHNIQUE: null CLINICAL HISTORY: AMS, patient lethargic, weakness, fever, hypoxia COMPARISON: null FINDINGS: Exam: CT Brain without contrast Comparison: None Clinical history: Altered mental status, patient lethargic, weakness, fever, hypoxia Findings: No acute intracranial hemorrhage. Mild Cerebral volume loss. No abnormal extra-axial fluid collections. No intracranial mass No midline shift. Bilateral cerebral white matter disease, likely ischemic microvascular in nature No skull fracture.     Impression: 1. No acute intracranial hemorrhage. 2. Mild cerebral volume loss. Mild Cerebral white matter disease, likely ischemic microvascular in nature] Authenticated and Electronically Signed by Pau Duffy MD on 08/23/2024 09:40:39 PM    XR Chest 1 View    Result Date: 8/23/2024  FINAL REPORT TECHNIQUE: null CLINICAL HISTORY: Dyspnea, fatigue, malaise; recent COVID Dx COMPARISON: null FINDINGS: 1 view chest x-ray Comparison: CR - XR CHEST 1 VW - 08/11/2024 12:15 PM EDT Findings: Very mild interstitial opacities are present within the right lower lung. There is a small calcified granuloma within the left lower lung. Heart is enlarged. The aorta is elongated. Postsurgical changes of the distal clavicles. No acute fracture. Chronic deformity of the left humeral head. Chronic ossific body adjacent to the left humeral head.     IMPRESSION: Very mild interstitial opacities within the right lower lung may be secondary to atelectasis, scarring or interstitial infiltrates.  Authenticated and Electronically Signed by Coral Pichardo MD on 08/23/2024 08:44:39 PM     PROCEDURES  Procedures    RISK STRATIFICATION    MEDICAL DECISION MAKING  79 y.o. female with past medical history listed above who presents with generalized weakness, nausea vomiting diarrhea, intermittent confusion.    Patient arrives via EMS.  I have reviewed the EMS documentation/notes and included that information in my HPI.    Vital signs markable for fever, hypertension, otherwise within normal limits.    Based on clinical presentation and physical exam, differential diagnosis includes, but is not limited to, viral URI, pneumonia, UTI, intra-abdominal process, CHF, intracranial structural abnormality, dehydration.    External notes reviewed.  Hospitalist discharge summary from 8/13/2024 reviewed.  Patient admitted with acute respiratory failure with hypoxia, COVID-19, exacerbation of heart failure.  Echocardiogram from 8/12/2024 reviewed.  EF 15-20%.    At least 3 different tests have been ordered on this patient.    Please see ED course below for my interpretation of the ED workup.  ED Course as of 08/23/24 2318   Fri Aug 23, 2024   2000 EKG per my interpretation normal sinus rhythm, rate 78, left axis deviation, left bundle branch block, QRS duration 178 ms, no STEMI, normal QTC interval.   [JS]   2217 I reviewed the labs listed above. Notable findings are highlighted below.    Old laboratory data was reviewed from the medical records and compared to today's results.   [JS]   2218 CBC & Differential(!) [JS]   2218 Comprehensive Metabolic Panel(!) [JS]   2218 Creatinine(!): 1.32 [JS]   2218 HS Troponin T(!!): 74  Baseline . [JS]   2218 proBNP(!): 17,372.0 [JS]   2218 Respiratory Panel PCR w/COVID-19(SARS-CoV-2) ERIN/ALICIA/SUPA/PAD/COR/DINORA In-House, NP Swab in UTM/VTM, 2 HR TAT - Swab, Nasopharynx(!!) [JS]   2218 COVID19(!!): Detected [JS]   2218 I have independently reviewed and interpreted the imaging listed above.   My interpretations are listed below:    -Chest x-ray negative for infiltrate.    -CT head negative for intracranial hemorrhage or mass effect.    -CTA chest negative for saddle pulmonary embolism.    -CT abdomen pelvis negative for small bowel obstruction.    Radiologist notes bilateral pulmonary groundglass opacities may represent pulmonary edema or atypical pneumonia.  Previously seen bilateral pleural fluid collections have resolved.  Colonic diverticulosis.     [JS]      ED Course User Index  [JS] Se Andrea DO     Medications administered in ED:  Medications   sodium chloride 0.9 % flush 10 mL (has no administration in time range)   cefTRIAXone (ROCEPHIN) 1,000 mg in sodium chloride 0.9 % 100 mL IVPB-VTB (has no administration in time range)   doxycycline (VIBRAMYCIN) 100 mg in sodium chloride 0.9 % 100 mL IVPB-VTB (has no administration in time range)   sodium chloride 0.9 % bolus 500 mL (500 mL Intravenous New Bag 8/23/24 2038)   ondansetron (ZOFRAN) injection 4 mg (4 mg Intravenous Given 8/23/24 2040)   acetaminophen (TYLENOL) tablet 975 mg (975 mg Oral Given 8/23/24 2045)   iopamidol (ISOVUE-300) 61 % injection 85 mL (85 mL Intravenous Given 8/23/24 2125)     On re-evaluation, patient resting comfortably.  Vital signs remained stable on baseline supplemental oxygen. Will proceed with medical admission for further workup and management.  I discussed the findings of the ED workup with the patient and her daughter including my recommendation for admission.  Patient agreeable with plan and disposition.    Case discussed with Dr. Huber (hospitalist) who agrees to evaluate and admit the patient.  We discussed the HPI, pertinent PMHx, ED course and workup. He requests procalcitonin, blood cultures x 2, lactic acid and empiric coverage with IV Rocephin/doxycycline.    Chronic conditions affecting care: None    Social determinants of health impacting treatment or disposition: None    REPEAT VITAL SIGNS  AS  OF 23:18 EDT VITALS:  BP - 162/73  HR - 73  TEMP - 99.4 °F (37.4 °C) (Oral)  O2 SATS - 97%    DIAGNOSIS  Final diagnoses:   Fever, unspecified fever cause   COVID-19   Generalized weakness   Nausea, vomiting, and diarrhea     DISPOSITION  ED Disposition       ED Disposition   Intended Admit    Condition   --    Comment   --             Please note that portions of this document were completed with voice recognition software.        Se Andrea,   08/24/24 [id number]

## 2024-08-24 NOTE — THERAPY EVALUATION
Patient Name: Sallie Daily  : 1945    MRN: [id number]                              Today's Date: 2024       Admit Date: 2024    Visit Dx:     ICD-10-CM ICD-9-CM   1. Fever, unspecified fever cause  R50.9 780.60   2. COVID-19  U07.1 079.89   3. Generalized weakness  R53.1 780.79   4. Nausea, vomiting, and diarrhea  R11.2 787.91    R19.7 787.01     Patient Active Problem List   Diagnosis    Essential hypertension    Coronary artery disease involving native coronary artery of native heart without angina pectoris    Pure hypercholesterolemia    Stage 3 chronic kidney disease    Severe obesity (BMI 35.0-39.9) with comorbidity    LBBB (left bundle branch block)    Muscle spasm    Acute exacerbation of CHF (congestive heart failure)    Cytokine release syndrome, grade 2    Community acquired pneumonia     Past Medical History:   Diagnosis Date    Abnormal ECG 2021    Post neck surgery    Allergic Sulfa    Anemia     Arthritis     Asthma 24    Couldn’t breathe    Cataract Removed    Cellulitis     CHF (congestive heart failure)     Chronic kidney disease 2002    Colon polyp     Coronary artery disease     Congestion after neck surgery led to pneumonia which led to heart “heart attack”    Diverticulitis     Diverticulosis Several    GERD (gastroesophageal reflux disease)     Gout     Heart attack     3/2021  mi    HL (hearing loss)     Hyperlipidemia     Hypertension     Hyperthyroidism Caught early    Inflammatory bowel disease [employer] device    Low back pain     Obesity     Osteoporosis     Pneumonia     Renal insufficiency     Sleep apnea 2010    Thyroid disease     Urinary tract infection Once    Visual impairment Nearsighted     Past Surgical History:   Procedure Laterality Date    CAROTID ENDARTERECTOMY      COLON SURGERY  1.5 ft removed    COLONOSCOPY      2022, q1yr , [facility]    EYE SURGERY  Cataract/lens    HERNIA REPAIR  5 times    HYSTERECTOMY   1990 ?    JOINT REPLACEMENT  Knee, rotator cuff x 2    NECK SURGERY      REPLACEMENT TOTAL KNEE      ROTATOR CUFF REPAIR      SINUS SURGERY  2021    SPINE SURGERY  Upper spine    TONSILLECTOMY  1950 ?    TUBAL ABDOMINAL LIGATION        General Information       [facility] Name 08/24/24 [facility]0          Physical Therapy Time and Intention    Document Type evaluation  -     Mode of Treatment physical therapy  -[facility] Name 08/24/24 [facility]0          General Information    Patient Profile Reviewed yes  -     Prior Level of Function independent:;ADL's;all household mobility;community mobility  -     Existing Precautions/Restrictions fall  -     Barriers to Rehab medically complex;previous functional deficit  -[facility] Name 08/24/24 1359          Living Environment    People in Home spouse  -[facility] Name 08/24/24 1356          Home Main Entrance    Number of Stairs, Main Entrance none  -[facility] Name 08/24/24 [facility]3          Stairs Within Home, Primary    Number of Stairs, Within Home, Primary none  -[facility] Name 08/24/24 135          Cognition    Orientation Status (Cognition) oriented x 4  -[facility] Name 08/24/24 [facility]1          Safety Issues, Functional Mobility    Safety Issues Affecting Function (Mobility) awareness of need for assistance;insight into deficits/self-awareness;safety precaution awareness;safety precautions follow-through/compliance  -     Impairments Affecting Function (Mobility) balance;endurance/activity tolerance;shortness of breath;strength  -               User Key  (r) = Recorded By, (t) = Taken By, (c) = Cosigned By      Initials Name Provider Type     Julio Ramon, PT Physical Therapist                   Mobility       [facility] Name 08/24/24 1700          Bed Mobility    Bed Mobility supine-sit  -     Supine-Sit [place] (Bed Mobility) modified independence  -     Assistive Device (Bed Mobility) bed rails;head of bed elevated  -[facility] Name 08/24/24 3711           Sit-Stand Transfer    Sit-Stand [place] (Transfers) standby assist  -     Assistive Device (Sit-Stand Transfers) walker, front-wheeled  -MK       Row Name 08/24/24 5387          Gait/Stairs (Locomotion)    [place] Level (Gait) contact guard  -     Assistive Device (Gait) walker, front-wheeled  -MK     Patient was able to Ambulate yes  -MK     Distance in Feet (Gait) 56  -MK     Deviations/Abnormal Patterns (Gait) gait speed decreased;base of support, wide  -MK     Bilateral Gait Deviations forward flexed posture  -               User Key  (r) = Recorded By, (t) = Taken By, (c) = Cosigned By      Initials Name Provider Type    Julio Miranda PT Physical Therapist                   Obj/Interventions       Row Name 08/24/24 1350          Range of Motion Comprehensive    General Range of Motion no range of motion deficits identified  -       Row Name 08/24/24 1354          Strength Comprehensive (MMT)    General Manual Muscle Testing (MMT) Assessment lower extremity strength deficits identified  -     Comment, General Manual Muscle Testing (MMT) Assessment B LE 4/5  -MK       Row Name 08/24/24 9653          Balance    Balance Assessment sitting static balance;sitting dynamic balance;sit to stand dynamic balance;standing static balance;standing dynamic balance  -     Static Sitting Balance modified independence  -MK     Dynamic Sitting Balance modified independence  -     Position, Sitting Balance unsupported;sitting edge of bed  -MK     Sit to Stand Dynamic Balance standby assist  -MK     Static Standing Balance contact guard  -MK     Dynamic Standing Balance contact guard  -MK     Position/Device Used, Standing Balance walker, front-wheeled  -MK     Balance Interventions sitting;standing;sit to stand  -               User Key  (r) = Recorded By, (t) = Taken By, (c) = Cosigned By      Initials Name Provider Type    Julio Miranda PT Physical Therapist                   Goals/Plan        Row Name 08/24/24 1400          Transfer Goal 1 (PT)    Activity/Assistive Device (Transfer Goal 1, PT) transfers, all  -MK     [place] Level/Cues Needed (Transfer Goal 1, PT) independent  -MK     Time Frame (Transfer Goal 1, PT) long term goal (LTG);10 days  -MK     Progress/Outcome (Transfer Goal 1, PT) new goal  -MK       Row Name 08/24/24 1400          Gait Training Goal 1 (PT)    Activity/Assistive Device (Gait Training Goal 1, PT) gait (walking locomotion)  -MK     [place] Level (Gait Training Goal 1, PT) independent  -MK     Distance (Gait Training Goal 1, PT) 150 ft  -MK     Time Frame (Gait Training Goal 1, PT) long term goal (LTG);10 days  -MK     Progress/Outcome (Gait Training Goal 1, PT) new goal  -MK       Row Name 08/24/24 1400          Patient Education Goal (PT)    Activity (Patient Education Goal, PT) Pt to participate in B LE ther ex program at least 3x per week  -MK     [place]/Cues/Accuracy (Memory Goal 2, PT) demonstrates adequately  -MK     Time Frame (Patient Education Goal, PT) long term goal (LTG);10 days  -MK     Progress/Outcome (Patient Education Goal, PT) new goal  -MK       Row Name 08/24/24 1400          Therapy Assessment/Plan (PT)    Planned Therapy Interventions (PT) balance training;bed mobility training;gait training;home exercise program;manual therapy techniques;neuromuscular re-education;patient/family education;strengthening;transfer training  -               User Key  (r) = Recorded By, (t) = Taken By, (c) = Cosigned By      Initials Name Provider Type    Julio Miranda, PT Physical Therapist                   Clinical Impression       Row Name 08/24/24 1878          Pain    Pretreatment Pain Rating 0/10 - no pain  -     Posttreatment Pain Rating 0/10 - no pain  -     Additional Documentation Pain Scale: Numbers Pre/Post-Treatment (Group)  -MK       Row Name 08/24/24 9473          Plan of Care Review    Plan of Care Reviewed With patient  -      Progress no change  -     Outcome Evaluation Pt participated in PT evaluation well this date. Pt had 2 L O2 donned with sats at 98%. pt came from supine to sitting mod I and sat EOB mod I. Pt able to don socks IND. Pt refused to wear O2 during ambulation, and completed STS SBA. Pt reports that she does not use O2 at home. Pt completed 56 ft of ambulation using RW, CGA. pt toileted IND and stood to wash hands SBA. Pt was returned to bedside chair and was left with all needs met, O2 donned with sats at 87% after ambulation. O2 rebounded to 96% upon departure of PT. Pt is expected to benefit from skilled PT during this inpatient stay and would further benefit from HH upon dc.  -       Row Name 08/24/24 2916          Therapy Assessment/Plan (PT)    Patient/Family Therapy Goals Statement (PT) go home with   -     Rehab Potential (PT) good, to achieve stated therapy goals  -     Criteria for Skilled Interventions Met (PT) yes;skilled treatment is necessary  -     Therapy Frequency (PT) 5 times/wk  -     Predicted Duration of Therapy Intervention (PT) 2 weeks  -       Row Name 08/24/24 1356          Vital Signs    Pre SpO2 (%) 98  -MK     O2 Delivery Pre Treatment supplemental O2  2 L  -MK     Intra SpO2 (%) 87  -MK     O2 Delivery Intra Treatment room air  -     Post SpO2 (%) 96  -MK     O2 Delivery Post Treatment supplemental O2  2 L  -MK     Pre Patient Position Supine  -     Intra Patient Position Standing  -     Post Patient Position Sitting  -       Row Name 08/24/24 1356          Positioning and Restraints    Pre-Treatment Position in bed  -     Post Treatment Position chair  -     In Chair sitting;call light within reach;encouraged to call for assist;exit alarm on  -               User Key  (r) = Recorded By, (t) = Taken By, (c) = Cosigned By      Initials Name Provider Type    Julio Miranda PT Physical Therapist                   Outcome Measures       Row Name 08/24/24 9602           How much help from another person do you currently need...    Turning from your back to your side while in flat bed without using bedrails? 4  -MK     Moving from lying on back to sitting on the side of a flat bed without bedrails? 4  -MK     Moving to and from a bed to a chair (including a wheelchair)? 4  -MK     Standing up from a chair using your arms (e.g., wheelchair, bedside chair)? 4  -MK     Climbing 3-5 steps with a railing? 3  -MK     To walk in hospital room? 3  -     AM-PAC 6 Clicks Score (PT) 22  -     Highest Level of Mobility Goal 7 --> Walk 25 feet or more  -       Row Name 08/24/24 1401          Functional Assessment    Outcome Measure Options AM-PAC 6 Clicks Basic Mobility (PT)  -               User Key  (r) = Recorded By, (t) = Taken By, (c) = Cosigned By      Initials Name Provider Type     Julio Ramon, PT Physical Therapist                                 Physical Therapy Education       Title: PT OT SLP Therapies (In Progress)       Topic: Physical Therapy (In Progress)       Point: Mobility training (Done)       Learning Progress Summary             Patient Acceptance, E,D, DU,VU by  at 8/24/2024 1401                         Point: Home exercise program (Not Started)       Learner Progress:  Not documented in this visit.              Point: Body mechanics (Done)       Learning Progress Summary             Patient Acceptance, E,D, DU,VU by  at 8/24/2024 1401                         Point: Precautions (Not Started)       Learner Progress:  Not documented in this visit.                              User Key       Initials Effective Dates Name Provider Type Discipline     06/13/23 -  Julio Ramon PT Physical Therapist PT                  PT Recommendation and Plan  Planned Therapy Interventions (PT): balance training, bed mobility training, gait training, home exercise program, manual therapy techniques, neuromuscular re-education, patient/family education, strengthening,  transfer training  Plan of Care Reviewed With: patient  Progress: no change  Outcome Evaluation: Pt participated in PT evaluation well this date. Pt had 2 L O2 donned with sats at 98%. pt came from supine to sitting mod I and sat EOB mod I. Pt able to don socks IND. Pt refused to wear O2 during ambulation, and completed STS SBA. Pt reports that she does not use O2 at home. Pt completed 56 ft of ambulation using RW, CGA. pt toileted IND and stood to wash hands SBA. Pt was returned to bedside chair and was left with all needs met, O2 donned with sats at 87% after ambulation. O2 rebounded to 96% upon departure of PT. Pt is expected to benefit from skilled PT during this inpatient stay and would further benefit from HH upon dc.     Time Calculation:   PT Evaluation Complexity  History, PT Evaluation Complexity: 3 or more personal factors and/or comorbidities  Examination of Body Systems (PT Eval Complexity): total of 3 or more elements  Clinical Presentation (PT Evaluation Complexity): evolving  Clinical Decision Making (PT Evaluation Complexity): moderate complexity  Overall Complexity (PT Evaluation Complexity): moderate complexity     PT Charges       Row Name 08/24/24 1106             Time Calculation    Start Time 1106  -MK      PT Received On 08/24/24  -SUSANA      PT Goal Re-Cert Due Date 09/03/24  -                User Key  (r) = Recorded By, (t) = Taken By, (c) = Cosigned By      Initials Name Provider Type    Julio Miranda PT Physical Therapist                  Therapy Charges for Today       Code Description Service Date Service Provider Modifiers Qty    93094010941 HC PT EVAL MOD COMPLEXITY 3 8/24/2024 Julio Ramon, PT GP 1            PT G-Codes  Outcome Measure Options: AM-PAC 6 Clicks Basic Mobility (PT)  AM-PAC 6 Clicks Score (PT): 22  PT Discharge Summary  Anticipated Discharge Disposition (PT): home with home health    Julio Ramon PT  8/24/2024

## 2024-08-24 NOTE — PROGRESS NOTES
[facility]    Name:  Sallie Daily   Age:  79 y.o.  Sex:  female  :  1945  MRN:  [id number]   Visit Number:  [id number]  Admission Date:  2024  Date Of Service:  24  Primary Care Physician:  Minnie Avila DO     LOS: 1 day :    Chief Complaint:      Cough, fever, weakness    Subjective:    Patient very pleasant.  Resting comfortably in bed.  No complaints, denies shortness of breath, nausea/vomiting, diarrhea.  Complains only of weakness though improved since arrival.    Hospital Course:    79-year-old female with history of heart failure reduced ejection fraction, recent COVID-19 infection, hypertension, hyperlipidemia, ANJEL on CPAP, hypothyroidism, who had presented from home due to increasing shortness of breath, weakness and suspected fever.  History obtained for the patient, daughter, chart.  She states started feeling increased shortness of breath and cough over the last 24 hours, thinks she may have had a fever the last couple days.  She actually called her PCP yesterday and did a televisit was started on antibiotics.  She notes swelling in her legs has improved.  She has had no abdominal pain has had some dysuria.  Cough has been somewhat productive.  She finished a course of dexamethasone with COVID symptoms last week.     In the ER the patient was overall hemodynamically stable.  She is wearing 2 L oxygen saturating 95%.  She had mild troponin elevation.  Lactic acid was 0.8 procalcitonin 0.11.  Blood cultures were obtained.  A creatinine was 1.32.  She had a white count of 9 hemoglobin 14.  proBNP 17,000.  CT scan abdomen pelvis with constipation, otherwise no acute abnormality.  CT scan of the chest with no PE.  Prior pleural effusions have resolved.  There is groundglass opacities likely representing pneumonia versus edema.  The patient was given empiric antibiotics, we were asked to admit    Review of Systems:     All systems  "were reviewed and negative except as mentioned in subjective, assessment and plan.    Vital Signs:    Temp:  [98.5 °F (36.9 °C)-100.6 °F (38.1 °C)] 98.8 °F (37.1 °C)  Heart Rate:  [55-82] 69  Resp:  [14-20] 18  BP: (128-169)/(63-75) 151/72    Intake and output:    No intake/output data recorded.  No intake/output data recorded.    Physical Examination:    General Appearance:  Alert and cooperative.    Head:  Atraumatic and normocephalic.   Eyes: Conjunctivae and sclerae normal, no icterus. No pallor.   Throat: No oral lesions, no thrush, oral mucosa moist.   Neck: Supple, trachea midline, no thyromegaly.   Lungs:   Breath sounds heard bilaterally equally.  No wheezing or crackles. No Pleural rub or bronchial breathing.   Heart:  Normal S1 and S2, no murmur, no gallop, no rub. No JVD.   Abdomen:   Normal bowel sounds, no masses, no organomegaly. Soft, nontender, nondistended, no rebound tenderness.   Extremities: Supple, no edema, no cyanosis, no clubbing.   Skin: No bleeding or rash.   Neurologic: Alert and oriented x 3. No facial asymmetry. Moves all four limbs. No tremors.      Laboratory results:    Results from last 7 days   Lab Units 08/23/24 1948   SODIUM mmol/L 135*   POTASSIUM mmol/L 4.5   CHLORIDE mmol/L 100   CO2 mmol/L 23.4   BUN mg/dL 54*   CREATININE mg/dL 1.32*   CALCIUM mg/dL 9.1   BILIRUBIN mg/dL 0.4   ALK PHOS U/L 50   ALT (SGPT) U/L 11   AST (SGOT) U/L 25   GLUCOSE mg/dL 113*     Results from last 7 days   Lab Units 08/23/24 1948   WBC 10*3/mm3 9.85   HEMOGLOBIN g/dL 14.2   HEMATOCRIT % 40.2   PLATELETS 10*3/mm3 98*         Results from last 7 days   Lab Units 08/23/24 2221 08/23/24 1948   HSTROP T ng/L 80* 74*         No results for input(s): \"PHART\", \"ICN5VSP\", \"PO2ART\", \"TCV8CFS\", \"BASEEXCESS\" in the last 8760 hours.   I have reviewed the patient's laboratory results.    Radiology results:    CT Abdomen Pelvis With Contrast    Result Date: 8/23/2024  FINAL REPORT TECHNIQUE: null CLINICAL " HISTORY: Fever, NVD, AMS, weakness COMPARISON: null FINDINGS: Exam: CT Abdomen and Pelvis with contrast Comparison: None Clinical history: Fever, NVD, AMS, weakness Findings: Small hiatal hernia The liver, spleen and pancreas do not demonstrate any acute process. No gallstones or evidence for acute cholecystitis No choledocholithiasis or biliary obstruction. Normal adrenal glands. No renal calculi or hydronephrosis. Small renal vascular calcification noted. No ureteral calculi or hydroureter. Symmetric enhancement of the kidneys bilaterally. No abdominal aortic aneurysm. No small bowel obstruction Appendix is normal in caliber Colonic diverticulosis without diverticulitis. Moderate to large colonic fecal load. Urinary bladder does not demonstrate stones or wall thickening. Prior hysterectomy. Prior left lower quadrant abdominal wall hernia repair No free fluid in the pelvis.     Impression: Impression: 1. Colonic diverticulosis without CT evidence for acute diverticulitis. Moderate to large colonic fecal load. Normal appendix. Authenticated and Electronically Signed by Pau Duffy MD on 08/23/2024 09:50:17 PM    CT Angiogram Chest Pulmonary Embolism    Result Date: 8/23/2024  FINAL REPORT TECHNIQUE: null CLINICAL HISTORY: Fever, AMS, hypoxia, weakness COMPARISON: null FINDINGS: Exam: CTA Chest with IV contrast. Procedure: Coronal and sagittal MIP reformats were performed Comparison: 08/11/2024 Clinical history: Fever, altered mental status, hypoxia, weakness Findings: Limited due to patient motion artifact. No pulmonary emboli. No thoracic aortic aneurysm or dissection. Atherosclerotic calcifications are seen at the aorta and coronary arteries. No hilar or mediastinal adenopathy. Cardiac size is enlarged. No significant pericardial fluid collection. No pneumothorax. No pleural fluid collection. Previously seen bilateral pleural fluid collections have resolved. Pulmonary ground-glass opacities seen in the lungs  bilaterally may represent pulmonary edema or atypical pneumonia. Small hiatal hernia No thoracic spine compression fractures     Impression: Impression: 1. No pulmonary emboli. 2. No thoracic aortic aneurysm or dissection 3. Bilateral pulmonary ground-glass opacities may represent pulmonary edema or atypical pneumonia. 4. Previously seen bilateral pleural fluid collections have resolved. Authenticated and Electronically Signed by Pau Duffy MD on 08/23/2024 09:49:02 PM    CT Head Without Contrast    Result Date: 8/23/2024  FINAL REPORT TECHNIQUE: null CLINICAL HISTORY: AMS, patient lethargic, weakness, fever, hypoxia COMPARISON: null FINDINGS: Exam: CT Brain without contrast Comparison: None Clinical history: Altered mental status, patient lethargic, weakness, fever, hypoxia Findings: No acute intracranial hemorrhage. Mild Cerebral volume loss. No abnormal extra-axial fluid collections. No intracranial mass No midline shift. Bilateral cerebral white matter disease, likely ischemic microvascular in nature No skull fracture.     Impression: Impression: 1. No acute intracranial hemorrhage. 2. Mild cerebral volume loss. Mild Cerebral white matter disease, likely ischemic microvascular in nature] Authenticated and Electronically Signed by Pau Duffy MD on 08/23/2024 09:40:39 PM    XR Chest 1 View    Result Date: 8/23/2024  FINAL REPORT TECHNIQUE: null CLINICAL HISTORY: Dyspnea, fatigue, malaise; recent COVID Dx COMPARISON: null FINDINGS: 1 view chest x-ray Comparison: CR - XR CHEST 1 VW - 08/11/2024 12:15 PM EDT Findings: Very mild interstitial opacities are present within the right lower lung. There is a small calcified granuloma within the left lower lung. Heart is enlarged. The aorta is elongated. Postsurgical changes of the distal clavicles. No acute fracture. Chronic deformity of the left humeral head. Chronic ossific body adjacent to the left humeral head.     Impression: IMPRESSION: Very mild  interstitial opacities within the right lower lung may be secondary to atelectasis, scarring or interstitial infiltrates. Authenticated and Electronically Signed by Coral Pichardo MD on 08/23/2024 08:44:39 PM   I have reviewed the patient's radiology reports.    Medication Review:     I have reviewed the patient's active and prn medications.     Problem List:      Community acquired pneumonia      Assessment:    Concern for community-acquired pneumonia/post-COVID pneumonia, POA  Recent COVID-19 infection  Heart failure reduced ejection fraction, not in exacerbation  CKD stage III    Plan:    Pneumonia/recent COVID:  -Symptomatology suspicious for bacterial infection post recent COVID-19 infection.    -Continue Rocephin and doxycycline.    -Monitor oxygen.   - Will hold on any further dexamethasone as she recently had this.       Heart failure reduced ejection fraction, stable:  Had recent exacerbation and workup of this 2 weeks ago.  Volume status has improved on imaging and on clinical exam.  Will continue with current medications including spironolactone, Entresto, Lasix, beta-blocker.       DVT Prophylaxis: Heparin  Code Status: DNR  Diet: Cardiac  Discharge Plan: Likely home in 1-2 days    Julio Livingston DO  08/24/24  11:17 EDT    Dictated utilizing Dragon dictation.

## 2024-08-24 NOTE — PLAN OF CARE
Goal Outcome Evaluation:  Plan of Care Reviewed With: patient        Progress: no change       VSS. BP elevated. See MAR.

## 2024-08-24 NOTE — PLAN OF CARE
Goal Outcome Evaluation:  Plan of Care Reviewed With: patient        Progress: no change  Outcome Evaluation: Pt participated in PT evaluation well this date. Pt had 2 L O2 donned with sats at 98%. pt came from supine to sitting mod I and sat EOB mod I. Pt able to don socks IND. Pt refused to wear O2 during ambulation, and completed STS SBA. Pt reports that she does not use O2 at home. Pt completed 56 ft of ambulation using RW, CGA. pt toileted IND and stood to wash hands SBA. Pt was returned to bedside chair and was left with all needs met, O2 donned with sats at 87% after ambulation. O2 rebounded to 96% upon departure of PT. Pt is expected to benefit from skilled PT during this inpatient stay and would further benefit from HH upon dc.      Anticipated Discharge Disposition (PT): home with home health

## 2024-08-24 NOTE — CASE MANAGEMENT/SOCIAL WORK
Discharge Planning Assessment   Kalpesh     Patient Name: Sallie Daily  MRN: [id number]  Today's Date: 8/24/2024    Admit Date: 8/23/2024    Plan: Met with pt for dcp, she provided demographics. No POA, AD, or financial stressors. Home DME includes O2 from [facility],, walker, and cpap. Dr. Fontaine is her primary, [facility] and [facility] are pharmacies used. Pt and her spouse have been at current residence for 2.5 yrs.; she plans to return home at [place] with family transporting.   Discharge Needs Assessment       Row Name 08/24/24 1134       Living Environment    People in Home spouse    Name(s) of People in Home Mychal Daily    Current Living Arrangements home    Duration at Residence 2.5 yrs    Potentially Unsafe Housing Conditions none    In the past 12 months has the electric, gas, oil, or water company threatened to shut off services in your home? No    Primary Care Provided by self    Family Caregiver if Needed child(becca), adult;spouse    Quality of Family Relationships helpful;involved    Able to Return to Prior Arrangements yes       Resource/Environmental Concerns    Resource/Environmental Concerns none    Transportation Concerns none       Transportation Needs    In the past 12 months, has lack of transportation kept you from medical appointments or from getting medications? no    In the past 12 months, has lack of transportation kept you from meetings, work, or from getting things needed for daily living? No       Food Insecurity    Within the past 12 months, you worried that your food would run out before you got the money to buy more. Never true    Within the past 12 months, the food you bought just didn't last and you didn't have money to get more. Never true       Transition Planning    Patient/Family Anticipates Transition to home with family    Patient/Family Anticipated Services at Transition none    Transportation Anticipated family or friend will provide       Discharge Needs Assessment     Readmission Within the Last 30 Days previous discharge plan unsuccessful  pt had covid now pnemonia    Equipment Currently Used at Home cpap    Concerns to be Addressed no discharge needs identified;denies needs/concerns at this time    Anticipated Changes Related to Illness none    Equipment Needed After Discharge none                   Discharge Plan       Row Name 08/24/24 1138       Plan    Plan Met with pt for dcp, she provided demographics. No POA, AD, or financial stressors. Home DME includes O2 from [facility],, walker, and cpap. Dr. Fontaine is her primary, [facility] and [facility] are pharmacies used. Pt and her spouse have been at current residence for 2.5 yrs.; she plans to return home at [place] with family transporting.                  Continued Care and Services - Admitted Since 8/23/2024    No active coordination exists for this encounter.       Selected Continued Care - Prior Encounters Includes continued care and service providers with selected services from prior encounters from 5/25/2024 to 8/24/2024      Discharged on 8/13/2024 Admission date: 8/11/2024 - Discharge disposition: Home or Self Care      Durable Medical Equipment       Service Provider Selected Services Address Phone Fax Patient Preferred    [facility] [address] [place] [id number] [phone] --                             Demographic Summary       Row Name 08/24/24 1131       General Information    Admission Type inpatient    Arrived From emergency department    Required Notices Provided Important Message from Medicare    Referral Source admission list    Reason for Consult discharge planning    Preferred Language English       Contact Information    Permission Granted to Share Info With family/designee                   Functional Status       Row Name 08/24/24 1132       Functional Status    Usual Activity Tolerance good    Current Activity Tolerance moderate        Physical Activity    On average, how many days per week do you engage in moderate to strenuous exercise (like a brisk walk)? 0 days    On average, how many minutes do you engage in exercise at this level? 0 min    Number of minutes of exercise per week 0       Functional Status, IADL    Medications independent    Meal Preparation independent    Housekeeping independent    Laundry assistive equipment    Shopping assistive equipment and person       Mental Status    General Appearance WDL WDL       Mental Status Summary    Recent Changes in Mental Status/Cognitive Functioning no changes       Employment/    Employment Status retired                   Psychosocial    No documentation.                  Abuse/Neglect    No documentation.                  Legal    No documentation.                  Substance Abuse    No documentation.                  Patient Forms    No documentation.                     Mirian Gaston RN

## 2024-08-24 NOTE — H&P
[facility]   HISTORY AND PHYSICAL      Name:  Sallie Daily   Age:  79 y.o.  Sex:  female  :  1945  MRN:  [id number]   Visit Number:  [id number]  Admission Date:  2024  Date Of Service:  24  Primary Care Physician:  Minnie Avila DO    Chief Complaint:     Cough, fever, weakness    History Of Presenting Illness:      79-year-old female with history of heart failure reduced ejection fraction, recent COVID-19 infection, hypertension, hyperlipidemia, ANJEL on CPAP, hypothyroidism, who had presented from home due to increasing shortness of breath, weakness and suspected fever.  History obtained for the patient, daughter, chart.  She states started feeling increased shortness of breath and cough over the last 24 hours, thinks she may have had a fever the last couple days.  She actually called her PCP yesterday and did a televisit was started on antibiotics.  She notes swelling in her legs has improved.  She has had no abdominal pain has had some dysuria.  Cough has been somewhat productive.  She finished a course of dexamethasone with COVID symptoms last week.    In the ER the patient was overall hemodynamically stable.  She is wearing 2 L oxygen saturating 95%.  She had mild troponin elevation.  Lactic acid was 0.8 procalcitonin 0.11.  Blood cultures were obtained.  A creatinine was 1.32.  She had a white count of 9 hemoglobin 14.  proBNP 17,000.  CT scan abdomen pelvis with constipation, otherwise no acute abnormality.  CT scan of the chest with no PE.  Prior pleural effusions have resolved.  There is groundglass opacities likely representing pneumonia versus edema.  The patient was given empiric antibiotics, we were asked to admit    Review Of Systems:    All systems were reviewed and negative except as mentioned in history of presenting illness, assessment and plan.    Past Medical History: Patient  has a past medical history of Abnormal ECG (2021), Allergic  (Sulfa), Anemia, Arthritis, Asthma (08/11/24), Cataract (Removed), Cellulitis, CHF (congestive heart failure) (11/21), Chronic kidney disease (1/2002), Colon polyp, Coronary artery disease (2021), Diverticulitis, Diverticulosis (Several), GERD (gastroesophageal reflux disease), Gout, Heart attack, HL (hearing loss), Hyperlipidemia, Hypertension, Hyperthyroidism (Caught early), Inflammatory bowel disease ([employer] device), Low back pain, Obesity, Osteoporosis, Pneumonia, Renal insufficiency (02/24), Sleep apnea (2010), Thyroid disease, Urinary tract infection (Once), and Visual impairment (Nearsighted).    Past Surgical History: Patient  has a past surgical history that includes Rotator cuff repair; Neck surgery; Replacement total knee; Colonoscopy; Colon surgery (1.5 ft removed); Eye surgery (Cataract/lens); Hernia repair (5 times); Hysterectomy (1990 ?); Joint replacement (Knee, rotator cuff x 2); Sinus surgery (2021); Spine surgery (Upper spine); Tonsillectomy (1950 ?); Tubal ligation; and Carotid endarterectomy (2011).    Social History: Patient  reports that she quit smoking about 18 years ago. Her smoking use included cigarettes. She started smoking about 60 years ago. She has a 42.1 pack-year smoking history. She has never used smokeless tobacco. She reports that she does not currently use alcohol. She reports that she does not use drugs.    Family History:  Patient's family history has been reviewed and found to be noncontributory.     Allergies:      Sulfa antibiotics    Home Medications:    Prior to Admission Medications       Prescriptions Last Dose Informant Patient Reported? Taking?    azithromycin (Zithromax Z-Alejandro) 250 MG tablet   No No    Take 2 tablets by mouth on day 1, then 1 tablet daily on days 2-5    carvedilol (COREG) 25 MG tablet   No No    TAKE 1 TABLET BY MOUTH  TWICE DAILY WITH MEALS    cefdinir (OMNICEF) 300 MG capsule   No No    Take 1 capsule by mouth 2 (Two) Times a Day.     chlorthalidone (HYGROTON) 25 MG tablet   No No    Take 1 tablet by mouth Daily.    Cholecalciferol 125 MCG (5000 UT) tablet   Yes No    Vitamin D3 125 mcg (5,000 unit) tablet   Take by oral route.    dexAMETHasone (DECADRON) 6 MG tablet   No No    Take 1 tablet by mouth Daily for 9 doses. Indications: COVID-19 Confirmed Infection    Diclofenac Sodium (VOLTAREN) 1 % gel gel   No No    Apply 4 g topically to the appropriate area as directed 4 (Four) Times a Day As Needed (pain).    digoxin (LANOXIN) 125 MCG tablet   No No    Take 1 tablet by mouth Every Other Day.    furosemide (Lasix) 20 MG tablet   No No    Take 1 tablet by mouth Daily.    guaiFENesin (Mucinex) 600 MG 12 hr tablet   No No    Take 2 tablets by mouth 2 (Two) Times a Day.    levocetirizine (XYZAL) 5 MG tablet   Yes No    Take 1 tablet by mouth Daily.    levothyroxine (SYNTHROID, LEVOTHROID) 150 MCG tablet   No No    Take 1 tablet by mouth Daily.    loperamide (IMODIUM) 2 MG capsule   Yes No    Take 1 capsule by mouth 4 (Four) Times a Day As Needed for Diarrhea.    Melatonin 10 MG capsule   Yes No    melatonin 10 mg capsule   Take 1 capsule every day by oral route.    omeprazole (priLOSEC) 20 MG capsule   Yes No    Take 1 capsule by mouth Daily.    pramipexole (MIRAPEX) 0.5 MG tablet   No No    Take 1 tablet by mouth Every 12 (Twelve) Hours.    sacubitril-valsartan (ENTRESTO) 24-26 MG tablet   No No    Take 1 tablet by mouth Every 12 (Twelve) Hours.    sertraline (ZOLOFT) 100 MG tablet   Yes No    Zoloft 100 mg tablet   Take 1 tablet every day by oral route.    spironolactone (ALDACTONE) 25 MG tablet   No No    Take 0.5 tablets by mouth Daily.    tiZANidine (ZANAFLEX) 4 MG tablet   Yes No    tizanidine 4 mg tablet          ED Medications:    Medications   sodium chloride 0.9 % flush 10 mL (has no administration in time range)   doxycycline (VIBRAMYCIN) 100 mg in sodium chloride 0.9 % 100 mL IVPB-VTB (has no administration in time range)   sodium  "chloride 0.9 % bolus 500 mL (500 mL Intravenous New Bag 8/23/24 2038)   ondansetron (ZOFRAN) injection 4 mg (4 mg Intravenous Given 8/23/24 2040)   acetaminophen (TYLENOL) tablet 975 mg (975 mg Oral Given 8/23/24 2045)   iopamidol (ISOVUE-300) 61 % injection 85 mL (85 mL Intravenous Given 8/23/24 2125)   cefTRIAXone (ROCEPHIN) 1,000 mg in sodium chloride 0.9 % 100 mL IVPB-VTB (1,000 mg Intravenous New Bag 8/23/24 2324)     Vital Signs:  Temp:  [99.4 °F (37.4 °C)-100.6 °F (38.1 °C)] 99.4 °F (37.4 °C)  Heart Rate:  [66-82] 66  Resp:  [14] 14  BP: (146-162)/(73-75) 146/74        08/23/24 1944   Weight: 64 kg (141 lb)     Body mass index is 27.54 kg/m².    Physical Exam:     Most recent vital Signs: /74   Pulse 66   Temp 99.4 °F (37.4 °C) (Oral)   Resp 14   Ht 152.4 cm (60\")   Wt 64 kg (141 lb)   SpO2 96%   BMI 27.54 kg/m²     Physical Exam  Constitutional:       General: She is not in acute distress.     Appearance: She is not ill-appearing.   Eyes:      Extraocular Movements: Extraocular movements intact.      Pupils: Pupils are equal, round, and reactive to light.   Cardiovascular:      Rate and Rhythm: Normal rate and regular rhythm.      Pulses: Normal pulses.      Heart sounds: No murmur heard.     No gallop.   Pulmonary:      Breath sounds: Rhonchi present. No wheezing or rales.   Abdominal:      General: Abdomen is flat. Bowel sounds are normal. There is no distension.      Tenderness: There is no abdominal tenderness. There is no guarding.   Musculoskeletal:         General: Normal range of motion.      Right lower leg: No edema.      Left lower leg: No edema.   Skin:     General: Skin is warm.      Capillary Refill: Capillary refill takes less than 2 seconds.      Findings: No lesion.   Neurological:      General: No focal deficit present.      Mental Status: She is alert. Mental status is at baseline.      Motor: Weakness present.         Laboratory data:    I have reviewed the labs done in the " "emergency room.    Results from last 7 days   Lab Units 08/23/24 1948   SODIUM mmol/L 135*   POTASSIUM mmol/L 4.5   CHLORIDE mmol/L 100   CO2 mmol/L 23.4   BUN mg/dL 54*   CREATININE mg/dL 1.32*   CALCIUM mg/dL 9.1   BILIRUBIN mg/dL 0.4   ALK PHOS U/L 50   ALT (SGPT) U/L 11   AST (SGOT) U/L 25   GLUCOSE mg/dL 113*     Results from last 7 days   Lab Units 08/23/24 1948   WBC 10*3/mm3 9.85   HEMOGLOBIN g/dL 14.2   HEMATOCRIT % 40.2   PLATELETS 10*3/mm3 98*         Results from last 7 days   Lab Units 08/23/24  2221 08/23/24 1948   HSTROP T ng/L 80* 74*     Results from last 7 days   Lab Units 08/23/24 1948   PROBNP pg/mL 17,372.0*                       Invalid input(s): \"USDES\", \"NITRITITE\", \"BACT\", \"EP\"    Pain Management Panel           No data to display                EKG:      Appears to be a sinus rhythm, normal rate.  There is a left bundle branch block present.  No obvious change from prior EKG 2 weeks ago    Radiology:    CT Abdomen Pelvis With Contrast    Result Date: 8/23/2024  FINAL REPORT TECHNIQUE: null CLINICAL HISTORY: Fever, NVD, AMS, weakness COMPARISON: null FINDINGS: Exam: CT Abdomen and Pelvis with contrast Comparison: None Clinical history: Fever, NVD, AMS, weakness Findings: Small hiatal hernia The liver, spleen and pancreas do not demonstrate any acute process. No gallstones or evidence for acute cholecystitis No choledocholithiasis or biliary obstruction. Normal adrenal glands. No renal calculi or hydronephrosis. Small renal vascular calcification noted. No ureteral calculi or hydroureter. Symmetric enhancement of the kidneys bilaterally. No abdominal aortic aneurysm. No small bowel obstruction Appendix is normal in caliber Colonic diverticulosis without diverticulitis. Moderate to large colonic fecal load. Urinary bladder does not demonstrate stones or wall thickening. Prior hysterectomy. Prior left lower quadrant abdominal wall hernia repair No free fluid in the pelvis.     Impression: " 1. Colonic diverticulosis without CT evidence for acute diverticulitis. Moderate to large colonic fecal load. Normal appendix. Authenticated and Electronically Signed by Pau Duffy MD on 08/23/2024 09:50:17 PM    CT Angiogram Chest Pulmonary Embolism    Result Date: 8/23/2024  FINAL REPORT TECHNIQUE: null CLINICAL HISTORY: Fever, AMS, hypoxia, weakness COMPARISON: null FINDINGS: Exam: CTA Chest with IV contrast. Procedure: Coronal and sagittal MIP reformats were performed Comparison: 08/11/2024 Clinical history: Fever, altered mental status, hypoxia, weakness Findings: Limited due to patient motion artifact. No pulmonary emboli. No thoracic aortic aneurysm or dissection. Atherosclerotic calcifications are seen at the aorta and coronary arteries. No hilar or mediastinal adenopathy. Cardiac size is enlarged. No significant pericardial fluid collection. No pneumothorax. No pleural fluid collection. Previously seen bilateral pleural fluid collections have resolved. Pulmonary ground-glass opacities seen in the lungs bilaterally may represent pulmonary edema or atypical pneumonia. Small hiatal hernia No thoracic spine compression fractures     Impression: 1. No pulmonary emboli. 2. No thoracic aortic aneurysm or dissection 3. Bilateral pulmonary ground-glass opacities may represent pulmonary edema or atypical pneumonia. 4. Previously seen bilateral pleural fluid collections have resolved. Authenticated and Electronically Signed by Pau Duffy MD on 08/23/2024 09:49:02 PM    CT Head Without Contrast    Result Date: 8/23/2024  FINAL REPORT TECHNIQUE: null CLINICAL HISTORY: AMS, patient lethargic, weakness, fever, hypoxia COMPARISON: null FINDINGS: Exam: CT Brain without contrast Comparison: None Clinical history: Altered mental status, patient lethargic, weakness, fever, hypoxia Findings: No acute intracranial hemorrhage. Mild Cerebral volume loss. No abnormal extra-axial fluid collections. No intracranial mass  No midline shift. Bilateral cerebral white matter disease, likely ischemic microvascular in nature No skull fracture.     Impression: 1. No acute intracranial hemorrhage. 2. Mild cerebral volume loss. Mild Cerebral white matter disease, likely ischemic microvascular in nature] Authenticated and Electronically Signed by Pau Duffy MD on 08/23/2024 09:40:39 PM    XR Chest 1 View    Result Date: 8/23/2024  FINAL REPORT TECHNIQUE: null CLINICAL HISTORY: Dyspnea, fatigue, malaise; recent COVID Dx COMPARISON: null FINDINGS: 1 view chest x-ray Comparison: CR - XR CHEST 1 VW - 08/11/2024 12:15 PM EDT Findings: Very mild interstitial opacities are present within the right lower lung. There is a small calcified granuloma within the left lower lung. Heart is enlarged. The aorta is elongated. Postsurgical changes of the distal clavicles. No acute fracture. Chronic deformity of the left humeral head. Chronic ossific body adjacent to the left humeral head.     IMPRESSION: Very mild interstitial opacities within the right lower lung may be secondary to atelectasis, scarring or interstitial infiltrates. Authenticated and Electronically Signed by Coral Pichardo MD on 08/23/2024 08:44:39 PM     Assessment:    Concern for community-acquired pneumonia/post-COVID pneumonia, POA  Recent COVID-19 infection  Heart failure reduced ejection fraction, not in exacerbation  CKD stage III    Plan:    Admit for further workup    Pneumonia/recent COVID:  Symptomatology suspicious for bacterial infection post recent COVID-19 infection.  Will place on Garcia antibiotics with Rocephin and doxycycline.  Monitor oxygen.  Will hold on any further dexamethasone as she recently had this.  Blood cultures pending.    Heart failure reduced ejection fraction, stable:  Had recent exacerbation and workup of this 2 weeks ago.  Volume status has improved on imaging and on clinical exam.  Will continue with current medications including spironolactone,  Entresto, Lasix, beta-blocker.    Patient otherwise meets inpatient level of care anticipate greater than 2 midnight stay.  Further recommendations predicated upon improvement clinical condition.    Risk Assessment: High  DVT Prophylaxis: Heparin  Code Status: DNR  Diet: Cardiac    Advance Care Planning   ACP discussion was held with the patient during this visit. Patient does not have an advance directive, declines further assistance.           Chiquita Huber,   08/24/24  00:02 EDT    Dictated utilizing Dragon dictation.

## 2024-08-24 NOTE — PLAN OF CARE
Goal Outcome Evaluation:  Plan of Care Reviewed With: patient        Progress: improving  Outcome Evaluation: VSS; pt alert and oriented; no acute events noted

## 2024-08-25 ENCOUNTER — READMISSION MANAGEMENT (OUTPATIENT)
Dept: CALL CENTER | Facility: HOSPITAL | Age: 79
End: 2024-08-25
Payer: MEDICARE

## 2024-08-25 VITALS
RESPIRATION RATE: 18 BRPM | BODY MASS INDEX: 27.48 KG/M2 | OXYGEN SATURATION: 91 % | TEMPERATURE: 98.3 F | WEIGHT: 139.99 LBS | HEIGHT: 60 IN | SYSTOLIC BLOOD PRESSURE: 160 MMHG | HEART RATE: 68 BPM | DIASTOLIC BLOOD PRESSURE: 75 MMHG

## 2024-08-25 LAB
ANION GAP SERPL CALCULATED.3IONS-SCNC: 11.8 MMOL/L (ref 5–15)
BUN SERPL-MCNC: 40 MG/DL (ref 8–23)
BUN/CREAT SERPL: 35.7 (ref 7–25)
CALCIUM SPEC-SCNC: 9.1 MG/DL (ref 8.6–10.5)
CHLORIDE SERPL-SCNC: 100 MMOL/L (ref 98–107)
CO2 SERPL-SCNC: 23.2 MMOL/L (ref 22–29)
CREAT SERPL-MCNC: 1.12 MG/DL (ref 0.57–1)
DEPRECATED RDW RBC AUTO: 46.5 FL (ref 37–54)
EGFRCR SERPLBLD CKD-EPI 2021: 50.1 ML/MIN/1.73
ERYTHROCYTE [DISTWIDTH] IN BLOOD BY AUTOMATED COUNT: 13 % (ref 12.3–15.4)
GLUCOSE SERPL-MCNC: 81 MG/DL (ref 65–99)
HCT VFR BLD AUTO: 37.1 % (ref 34–46.6)
HGB BLD-MCNC: 13 G/DL (ref 12–15.9)
MCH RBC QN AUTO: 34.1 PG (ref 26.6–33)
MCHC RBC AUTO-ENTMCNC: 35 G/DL (ref 31.5–35.7)
MCV RBC AUTO: 97.4 FL (ref 79–97)
PLATELET # BLD AUTO: 84 10*3/MM3 (ref 140–450)
PMV BLD AUTO: 11.5 FL (ref 6–12)
POTASSIUM SERPL-SCNC: 4.2 MMOL/L (ref 3.5–5.2)
RBC # BLD AUTO: 3.81 10*6/MM3 (ref 3.77–5.28)
SODIUM SERPL-SCNC: 135 MMOL/L (ref 136–145)
WBC NRBC COR # BLD AUTO: 6.02 10*3/MM3 (ref 3.4–10.8)

## 2024-08-25 PROCEDURE — 99239 HOSP IP/OBS DSCHRG MGMT >30: CPT | Performed by: INTERNAL MEDICINE

## 2024-08-25 PROCEDURE — 25010000002 CEFTRIAXONE PER 250 MG: Performed by: FAMILY MEDICINE

## 2024-08-25 PROCEDURE — 25010000002 HEPARIN (PORCINE) PER 1000 UNITS: Performed by: FAMILY MEDICINE

## 2024-08-25 PROCEDURE — 85027 COMPLETE CBC AUTOMATED: CPT | Performed by: INTERNAL MEDICINE

## 2024-08-25 PROCEDURE — 80048 BASIC METABOLIC PNL TOTAL CA: CPT | Performed by: INTERNAL MEDICINE

## 2024-08-25 RX ORDER — ONDANSETRON 4 MG/1
4 TABLET, ORALLY DISINTEGRATING ORAL EVERY 8 HOURS PRN
Qty: 30 TABLET | Refills: 0 | Status: SHIPPED | OUTPATIENT
Start: 2024-08-25

## 2024-08-25 RX ORDER — DOXYCYCLINE 100 MG/1
100 CAPSULE ORAL EVERY 12 HOURS SCHEDULED
Qty: 7 CAPSULE | Refills: 0 | Status: SHIPPED | OUTPATIENT
Start: 2024-08-25 | End: 2024-08-29

## 2024-08-25 RX ADMIN — FUROSEMIDE 20 MG: 20 TABLET ORAL at 08:25

## 2024-08-25 RX ADMIN — CEFTRIAXONE 1000 MG: 1 INJECTION, POWDER, FOR SOLUTION INTRAMUSCULAR; INTRAVENOUS at 00:21

## 2024-08-25 RX ADMIN — HEPARIN SODIUM 5000 UNITS: 5000 INJECTION INTRAVENOUS; SUBCUTANEOUS at 08:26

## 2024-08-25 RX ADMIN — SPIRONOLACTONE 12.5 MG: 25 TABLET, FILM COATED ORAL at 08:25

## 2024-08-25 RX ADMIN — DOXYCYCLINE 100 MG: 100 CAPSULE ORAL at 08:25

## 2024-08-25 RX ADMIN — SACUBITRIL AND VALSARTAN 1 TABLET: 24; 26 TABLET, FILM COATED ORAL at 08:25

## 2024-08-25 RX ADMIN — CARVEDILOL 25 MG: 25 TABLET, FILM COATED ORAL at 08:25

## 2024-08-25 RX ADMIN — SERTRALINE 100 MG: 50 TABLET, FILM COATED ORAL at 08:25

## 2024-08-25 RX ADMIN — CHLORTHALIDONE 25 MG: 25 TABLET ORAL at 08:25

## 2024-08-25 RX ADMIN — LEVOTHYROXINE SODIUM 175 MCG: 0.15 TABLET ORAL at 10:38

## 2024-08-25 RX ADMIN — Medication 10 ML: at 08:30

## 2024-08-25 NOTE — CASE MANAGEMENT/SOCIAL WORK
Case Management Discharge Note                Selected Continued Care - Admitted Since 8/23/2024       Destination    No services have been selected for the patient.                Durable Medical Equipment    No services have been selected for the patient.                Dialysis/Infusion    No services have been selected for the patient.                Home Medical Care Coordination complete.      Service Provider Selected Services Address Phone Fax Patient Preferred    [facility] [address], [address] [address] [phone] --       Internal Comment last updated by Mirian Gaston RN 8/25/2024 0956    Accepted by Gloria Huitron    No services have been selected for the patient.                Community Resources    No services have been selected for the patient.                Community & DME    No services have been selected for the patient.                    Selected Continued Care - Prior Encounters Includes continued care and service providers with selected services from prior encounters from 5/25/2024 to 8/25/2024      Discharged on 8/13/2024 Admission date: 8/11/2024 - Discharge disposition: Home or Self Care      Durable Medical Equipment       Service Provider Selected Services Address Phone Fax Patient Preferred    [facility] [address] [address] [phone] --                          Transportation Services  Private: Car    Final Discharge Disposition Code: 06 - home with home health care

## 2024-08-25 NOTE — OUTREACH NOTE
Prep Survey      Flowsheet Row Responses   Confucianism facility patient discharged from? [facility]   Is LACE score < 7 ? No   Eligibility [facility]   Date of Admission 08/23/24   Date of Discharge 08/25/24   Discharge Disposition Home-Health Care Svc   Discharge diagnosis Community acquired pneumonia   Does the patient have one of the following disease processes/diagnoses(primary or secondary)? Pneumonia   Does the patient have Home health ordered? Yes   What is the Home health agency?  [facility]    Is there a DME ordered? No   Prep survey completed? Yes            Josephine PINTO - Registered Nurse

## 2024-08-25 NOTE — DISCHARGE SUMMARY
[facility]   [facility]      Name:  Sallie Daily   Age:  79 y.o.  Sex:  female  :  1945  MRN:  [id number]   Visit Number:  [id number]    Admission Date:  2024  Date of Discharge:  2024  Primary Care Physician:  Minnie Avila, DO      Discharge Diagnoses:     Concern for community-acquired pneumonia/post-COVID pneumonia, POA  Recent COVID-19 infection  Heart failure reduced ejection fraction, not in exacerbation  CKD stage III    Problem List:     Active Hospital Problems    Diagnosis  POA    **Community acquired pneumonia [J18.9]  Yes      Resolved Hospital Problems   No resolved problems to display.     Presenting Problem:    Chief Complaint   Patient presents with    Fatigue    Nausea    Vomiting    Diarrhea      Consults:     Consulting Physician(s)                     None              Procedures Performed:        History of presenting illness/Hospital Course:    79-year-old female with history of heart failure reduced ejection fraction, recent COVID-19 infection, hypertension, hyperlipidemia, ANJEL on CPAP, hypothyroidism, who had presented from home due to increasing shortness of breath, weakness and suspected fever. She finished a course of dexamethasone with COVID symptoms last week.     In the ER the patient was overall hemodynamically stable.  She is wearing 2 L oxygen saturating 95%.  She had mild troponin elevation.  Lactic acid was 0.8 procalcitonin 0.11.  Blood cultures were obtained.  A creatinine was 1.32.  She had a white count of 9 hemoglobin 14.  proBNP 17,000.  CT scan abdomen pelvis with constipation, otherwise no acute abnormality.  CT scan of the chest with no PE.  Prior pleural effusions have resolved.  There is groundglass opacities likely representing pneumonia versus edema.  The patient was given empiric antibiotics, we were asked to admit    Pneumonia/recent COVID:  -Symptomatology suspicious for bacterial infection post  recent COVID-19 infection.    -Doxycycline prescribed at discharge to complete 5 days of treatment.  -Monitor oxygen.  Remained stable on her chronic 2 L nasal cannula.  - Will hold on any further dexamethasone as she recently had this.       Heart failure reduced ejection fraction, stable:  Had recent exacerbation and workup of this 2 weeks ago.  Volume status has improved on imaging and on clinical exam.  Will continue with current medications including spironolactone, Entresto, Lasix, beta-blocker.    Patient perform well with PT/OT.  Discharged home with home health services.  Follow-up primary physician within 1 week.  Zofran prescribed for symptomatic as needed support.  Strict return instructions given.    Vital Signs:    Temp:  [98 °F (36.7 °C)-98.5 °F (36.9 °C)] 98.3 °F (36.8 °C)  Heart Rate:  [54-71] 68  Resp:  [16-18] 18  BP: (116-160)/(68-90) 160/75    Physical Exam:    General Appearance:  Alert and cooperative.    Head:  Atraumatic and normocephalic.   Eyes: Conjunctivae and sclerae normal, no icterus. No pallor.   Ears:  Ears with no abnormalities noted.   Throat: No oral lesions, no thrush, oral mucosa moist.   Neck: Supple, trachea midline, no thyromegaly.       Lungs:   Breath sounds heard bilaterally equally.  No crackles or wheezing. No Pleural rub or bronchial breathing.   Heart:  Normal S1 and S2, no murmur, no gallop, no rub. No JVD.   Abdomen:   Normal bowel sounds, no masses, no organomegaly. Soft, nontender, nondistended, no rebound tenderness.   Extremities: Supple, no edema, no cyanosis, no clubbing.   Pulses: Pulses palpable bilaterally.   Skin: No bleeding or rash.   Neurologic: Alert and oriented x 3. No facial asymmetry. Moves all four limbs.      Pertinent Lab Results:     Results from last 7 days   Lab Units 08/25/24  0543 08/23/24  1948   SODIUM mmol/L 135* 135*   POTASSIUM mmol/L 4.2 4.5   CHLORIDE mmol/L 100 100   CO2 mmol/L 23.2 23.4   BUN mg/dL 40* 54*   CREATININE mg/dL 1.12*  1.32*   CALCIUM mg/dL 9.1 9.1   BILIRUBIN mg/dL  --  0.4   ALK PHOS U/L  --  50   ALT (SGPT) U/L  --  11   AST (SGOT) U/L  --  25   GLUCOSE mg/dL 81 113*     Results from last 7 days   Lab Units 08/25/24  0543 08/23/24 1948   WBC 10*3/mm3 6.02 9.85   HEMOGLOBIN g/dL 13.0 14.2   HEMATOCRIT % 37.1 40.2   PLATELETS 10*3/mm3 84* 98*         Results from last 7 days   Lab Units 08/23/24  2221 08/23/24 1948   HSTROP T ng/L 80* 74*     Results from last 7 days   Lab Units 08/23/24 1948   PROBNP pg/mL 17,372.0*                 Results from last 7 days   Lab Units 08/23/24 2241   BLOODCX  No growth at 24 hours  No growth at 24 hours       Pertinent Radiology Results:    Imaging Results (All)       Procedure Component Value Units Date/Time    CT Abdomen Pelvis With Contrast [[id number]] Collected: 08/23/24 2150     Updated: 08/23/24 2151    Narrative:      FINAL REPORT    TECHNIQUE:  null    CLINICAL HISTORY:  Fever, NVD, AMS, weakness    COMPARISON:  null    FINDINGS:  Exam: CT Abdomen and Pelvis with contrast    Comparison: None    Clinical history: Fever, NVD, AMS, weakness    Findings:    Small hiatal hernia    The liver, spleen and pancreas do not demonstrate any acute process.    No gallstones or evidence for acute cholecystitis    No choledocholithiasis or biliary obstruction.    Normal adrenal glands.    No renal calculi or hydronephrosis. Small renal vascular calcification noted.    No ureteral calculi or hydroureter.    Symmetric enhancement of the kidneys bilaterally.    No abdominal aortic aneurysm.    No small bowel obstruction    Appendix is normal in caliber    Colonic diverticulosis without diverticulitis. Moderate to large colonic fecal load.    Urinary bladder does not demonstrate stones or wall thickening.    Prior hysterectomy.    Prior left lower quadrant abdominal wall hernia repair    No free fluid in the pelvis.      Impression:      Impression:    1. Colonic diverticulosis without CT evidence for  acute diverticulitis. Moderate to large colonic fecal load. Normal appendix.    Authenticated and Electronically Signed by Pau Duffy MD  on 08/23/2024 09:50:17 PM    CT Angiogram Chest Pulmonary Embolism [[id number]] Collected: 08/23/24 2149     Updated: 08/23/24 2150    Narrative:      FINAL REPORT    TECHNIQUE:  null    CLINICAL HISTORY:  Fever, AMS, hypoxia, weakness    COMPARISON:  null    FINDINGS:  Exam: CTA Chest with IV contrast.    Procedure: Coronal and sagittal MIP reformats were performed    Comparison: 08/11/2024    Clinical history: Fever, altered mental status, hypoxia, weakness    Findings:    Limited due to patient motion artifact.    No pulmonary emboli.    No thoracic aortic aneurysm or dissection.    Atherosclerotic calcifications are seen at the aorta and coronary arteries.    No hilar or mediastinal adenopathy.    Cardiac size is enlarged.    No significant pericardial fluid collection.    No pneumothorax.    No pleural fluid collection. Previously seen bilateral pleural fluid collections have resolved.    Pulmonary ground-glass opacities seen in the lungs bilaterally may represent pulmonary edema or atypical pneumonia.    Small hiatal hernia    No thoracic spine compression fractures      Impression:      Impression:    1. No pulmonary emboli.    2. No thoracic aortic aneurysm or dissection    3. Bilateral pulmonary ground-glass opacities may represent pulmonary edema or atypical pneumonia.    4. Previously seen bilateral pleural fluid collections have resolved.    Authenticated and Electronically Signed by Pau Duffy MD  on 08/23/2024 09:49:02 PM    CT Head Without Contrast [[id number]] Collected: 08/23/24 2140     Updated: 08/23/24 2142    Narrative:      FINAL REPORT    TECHNIQUE:  null    CLINICAL HISTORY:  AMS, patient lethargic, weakness, fever, hypoxia    COMPARISON:  null    FINDINGS:  Exam: CT Brain without contrast    Comparison: None    Clinical history: Altered mental  status, patient lethargic, weakness, fever, hypoxia    Findings:    No acute intracranial hemorrhage.    Mild Cerebral volume loss.    No abnormal extra-axial fluid collections.    No intracranial mass    No midline shift.    Bilateral cerebral white matter disease, likely ischemic microvascular in nature    No skull fracture.      Impression:      Impression:    1. No acute intracranial hemorrhage.    2. Mild cerebral volume loss. Mild Cerebral white matter disease, likely ischemic microvascular in nature]    Authenticated and Electronically Signed by Pau Duffy MD  on 08/23/2024 09:40:39 PM    XR Chest 1 View [[id number]] Collected: 08/23/24 2044     Updated: 08/23/24 2046    Narrative:      FINAL REPORT    TECHNIQUE:  null    CLINICAL HISTORY:  Dyspnea, fatigue, malaise; recent COVID Dx    COMPARISON:  null    FINDINGS:  1 view chest x-ray    Comparison: CR - XR CHEST 1 VW - 08/11/2024 12:15 PM EDT    Findings:    Very mild interstitial opacities are present within the right lower lung. There is a small calcified granuloma within the left lower lung.    Heart is enlarged. The aorta is elongated.    Postsurgical changes of the distal clavicles. No acute fracture. Chronic deformity of the left humeral head. Chronic ossific body adjacent to the left humeral head.      Impression:      IMPRESSION:    Very mild interstitial opacities within the right lower lung may be secondary to atelectasis, scarring or interstitial infiltrates.    Authenticated and Electronically Signed by Coral Pichardo MD on  08/23/2024 08:44:39 PM            Echo:    Results for orders placed during the hospital encounter of 08/11/24    Adult Transthoracic Echo Complete w/ Color, Spectral and Contrast if necessary per protocol    Interpretation Summary    Left ventricular systolic function is severely decreased. Left ventricular ejection fraction appears to 15-20%. The left ventricular cavity is mildly dilated. Grade I diastolic  dysfunction present.    Regional wall motion abnormalities as below.  No left ventricular thrombus noted.    Normal right ventricular size with borderline systolic function.    The right atrial cavity is mild to moderately  dilated.    Mild mitral valve regurgitation is present.    Mild to moderate tricuspid valve regurgitation is present. Estimated right ventricular systolic pressure from tricuspid regurgitation is markedly elevated (>55 mmHg).    There is a small left pleural effusion.    Condition on Discharge:      Stable.    Code status during the hospital stay:    Code Status and Medical Interventions: No CPR (Do Not Attempt to Resuscitate); Limited Support; No intubation (DNI), No cardioversion   Ordered at: 08/24/24 0007     Medical Intervention Limits:    No intubation (DNI)    No cardioversion     Code Status (Patient has no pulse and is not breathing):    No CPR (Do Not Attempt to Resuscitate)     Medical Interventions (Patient has pulse or is breathing):    Limited Support     Discharge Disposition:    Home-Health Care [facility]    Discharge Medications:       Discharge Medications        New Medications        Instructions Start Date   doxycycline 100 MG capsule  Commonly known as: MONODOX   100 mg, Oral, Every 12 Hours Scheduled      ondansetron ODT 4 MG disintegrating tablet  Commonly known as: ZOFRAN-ODT   4 mg, Translingual, Every 8 Hours PRN             Continue These Medications        Instructions Start Date   carvedilol 25 MG tablet  Commonly known as: COREG   TAKE 1 TABLET BY MOUTH  TWICE DAILY WITH MEALS      chlorthalidone 25 MG tablet  Commonly known as: HYGROTON   25 mg, Oral, Daily      Cholecalciferol 125 MCG (5000 UT) tablet   Vitamin D3 125 mcg (5,000 unit) tablet   Take by oral route.      Diclofenac Sodium 1 % gel gel  Commonly known as: VOLTAREN   4 g, Topical, 4 Times Daily PRN      digoxin 125 MCG tablet  Commonly known as: LANOXIN   125 mcg, Oral, Every 48 Hours      Entresto 24-26 MG  tablet  Generic drug: sacubitril-valsartan   1 tablet, Oral, Every 12 Hours Scheduled      furosemide 20 MG tablet  Commonly known as: Lasix   20 mg, Oral, Daily      guaiFENesin 600 MG 12 hr tablet  Commonly known as: Mucinex   1,200 mg, Oral, 2 Times Daily      levocetirizine 5 MG tablet  Commonly known as: XYZAL   1 tablet, Oral, Daily      levothyroxine 150 MCG tablet  Commonly known as: SYNTHROID, LEVOTHROID   150 mcg, Oral, Daily      loperamide 2 MG capsule  Commonly known as: IMODIUM   2 mg, Oral, 4 Times Daily PRN      Melatonin 10 MG capsule   melatonin 10 mg capsule   Take 1 capsule every day by oral route.      omeprazole 20 MG capsule  Commonly known as: priLOSEC   1 capsule, Oral, Daily      pramipexole 0.5 MG tablet  Commonly known as: MIRAPEX   0.5 mg, Oral, Every 12 Hours Scheduled      sertraline 100 MG tablet  Commonly known as: ZOLOFT   Zoloft 100 mg tablet   Take 1 tablet every day by oral route.      spironolactone 25 MG tablet  Commonly known as: ALDACTONE   12.5 mg, Oral, Daily      tiZANidine 4 MG tablet  Commonly known as: ZANAFLEX   tizanidine 4 mg tablet             Stop These Medications      azithromycin 250 MG tablet  Commonly known as: Zithromax Z-Alejandro     cefdinir 300 MG capsule  Commonly known as: OMNICEF     dexAMETHasone 6 MG tablet  Commonly known as: DECADRON            Discharge Diet:     Diet Instructions       Diet: Cardiac Diets; Healthy Heart (2-3 Na+); Regular (IDDSI 7); Thin (IDDSI 0)      Discharge Diet: Cardiac Diets    Cardiac Diet: Healthy Heart (2-3 Na+)    Texture: Regular (IDDSI 7)    Fluid Consistency: Thin (IDDSI 0)          Activity at Discharge:     Activity Instructions       Activity as Tolerated            Follow-up Appointments:    Additional Instructions for the Follow-ups that You Need to Schedule       Ambulatory Referral to Home Health   As directed      Face to Face Visit Date: 8/25/2024   Follow-up provider for Plan of Care?: I treated the patient in  an acute care facility and will not continue treatment after discharge.   Follow-up provider: MINNIE AVILA [[id number]]   Reason/Clinical Findings: Covid, oxygen dependence, weakness   Describe mobility limitations that make leaving home difficult: Covid, oxygen dependence, weakness   Nursing/Therapeutic Services Requested: Physical Therapy Occupational Therapy   PT orders: Strengthening Total joint pathway Therapeutic exercise Gait Training Electrical stimulation Ultrasound Home safety assessment Transfer training   Weight Bearing Status: As Tolerated   Occupational orders: Activities of daily living Home safety assessment Energy conservation Strengthening Cognition Fine motor   Frequency: 1 Week 1        Discharge Follow-up with PCP   As directed       Currently Documented PCP:    Minnie Avila DO    PCP Phone Number:    [phone]     Follow Up Details: 1 week               Follow-up Information       Minnie Avila DO .    Specialty: Family Medicine  Why: 1 week  Contact information:  [address] [facility] [address]  [facility] [address]  [phone]                           Future Appointments   Date Time Provider Department Center   9/13/2024 11:30 AM Yasmine Watson APRN [facility]     Test Results Pending at Discharge:    Pending Labs       Order Current Status    Blood Culture With MONROE - Blood, Hand, Right Preliminary result    Blood Culture With [facility] - Blood, Wrist, Right Preliminary result               Julio Livingston DO  08/25/24  09:37 EDT    Time: I spent >30 minutes on this discharge activity which included: face-to-face encounter with the patient, reviewing the data in the system, coordination of the care with the nursing staff as well as consultants, documentation, and entering orders.     Dictated utilizing Dragon dictation.

## 2024-08-25 NOTE — CASE MANAGEMENT/SOCIAL WORK
Met with pt who is agreeable to HH. She doesn't have provider preference. Able to be accepted by [facility].

## 2024-08-25 NOTE — PLAN OF CARE
Goal Outcome Evaluation:  Plan of Care Reviewed With: patient           Outcome Evaluation: Patient did well overnight. Vital signs stable and will continue to monitor. Currently titrated to 1 L of oxygen. No overnight events to report.

## 2024-08-26 ENCOUNTER — TRANSITIONAL CARE MANAGEMENT TELEPHONE ENCOUNTER (OUTPATIENT)
Dept: CALL CENTER | Facility: HOSPITAL | Age: 79
End: 2024-08-26
Payer: MEDICARE

## 2024-08-26 ENCOUNTER — TELEPHONE (OUTPATIENT)
Dept: TELEMETRY | Facility: HOSPITAL | Age: 79
End: 2024-08-26
Payer: MEDICARE

## 2024-08-26 NOTE — OUTREACH NOTE
Call Center TCM Note      Flowsheet Row Responses   [facility] patient discharged from? [facility]   Does the patient have one of the following disease processes/diagnoses(primary or secondary)? Pneumonia   TCM attempt successful? Yes   Call start time 1303   Call end time 1308   Discharge diagnosis Community acquired pneumonia   Person spoke with today (if not patient) and relationship pt   Meds reviewed with patient/caregiver? Yes   Is the patient having any side effects they believe may be caused by any medication additions or changes? No   Does the patient have all medications ordered at discharge? Yes   Is the patient taking all medications as directed (includes completed medication regime)? Yes   Comments Cardiology 9/13/24   Does the patient have an appointment with their PCP within 7-14 days of discharge? Yes  [8/27/2024  8:30 AM]   What is the Home health agency?  [facility]    Has home health visited the patient within 72 hours of discharge? Call prior to 72 hours   Psychosocial issues? No   Did the patient receive a copy of their discharge instructions? Yes   Nursing interventions Reviewed instructions with patient   What is the patient's perception of their health status since discharge? Improving   Nursing Interventions Nurse provided patient education   If the patient is a current smoker, are they able to teach back resources for cessation? Not a smoker   Is the patient/caregiver able to teach back the hierarchy of who to call/visit for symptoms/problems? PCP, Specialist, Home health nurse, Urgent Care, ED, 911 Yes   Is the patient/caregiver able to teach back signs and symptoms of worsening condition: Fever/chills, Shortness of breath, Chest pain   Is the patient/caregiver able to teach back importance of completing antibiotic course of treatment? Yes   TCM call completed? Yes   Wrap up additional comments Pt report an unproductive cough, and a little SOB. Reviewed AVS/meds with pt. Pt declined  appt made for PCP fu appt, and wants office to call to reschedule. Nephrology 8/27/24, and Cardiology 9/13/24.   Call end time 1308   Would this patient benefit from a Referral to [facility] Social Work? No   Is the patient interested in additional calls from an ambulatory ? No            Nadira Payne RN    8/26/2024, 13:10 EDT

## 2024-08-28 DIAGNOSIS — J90 PLEURAL EFFUSION: ICD-10-CM

## 2024-08-28 LAB
BACTERIA SPEC AEROBE CULT: NORMAL
BACTERIA SPEC AEROBE CULT: NORMAL

## 2024-08-28 NOTE — TELEPHONE ENCOUNTER
Rx Refill Note  Requested Prescriptions     Pending Prescriptions Disp Refills    sacubitril-valsartan (ENTRESTO) 24-26 MG tablet 60 tablet 0     Sig: Take 1 tablet by mouth Every 12 (Twelve) Hours.    spironolactone (ALDACTONE) 25 MG tablet 30 tablet 0     Sig: Take 0.5 tablets by mouth Daily.    digoxin (LANOXIN) 125 MCG tablet 30 tablet 0     Sig: Take 1 tablet by mouth Every Other Day.    furosemide (Lasix) 20 MG tablet 90 tablet 0     Sig: Take 1 tablet by mouth Daily.      Last office visit with prescribing clinician: 6/22/2023   Last telemedicine visit with prescribing clinician: 8/22/2024   Next office visit with prescribing clinician: Visit date not found                         Would you like a call back once the refill request has been completed: [] Yes [] No    If the office needs to give you a call back, can they leave a voicemail: [] Yes [] No    Claudette Rowe MA  08/28/24, 12:38 EDT

## 2024-08-28 NOTE — TELEPHONE ENCOUNTER
"  Caller: [place], [place] \"Kailyn\"    Relationship: Self    Best call back number: [phone]    Requested Prescriptions:   Requested Prescriptions     Pending Prescriptions Disp Refills    sacubitril-valsartan (ENTRESTO) 24-26 MG tablet 60 tablet 0     Sig: Take 1 tablet by mouth Every 12 (Twelve) Hours.    spironolactone (ALDACTONE) 25 MG tablet 30 tablet 0     Sig: Take 0.5 tablets by mouth Daily.    digoxin (LANOXIN) 125 MCG tablet 30 tablet 0     Sig: Take 1 tablet by mouth Every Other Day.    furosemide (Lasix) 20 MG tablet 90 tablet 0     Sig: Take 1 tablet by mouth Daily.        Pharmacy where request should be sent: [address] [phone] [facility] [phone]      Last office visit with prescribing clinician: 6/22/2023   Last telemedicine visit with prescribing clinician: 8/22/2024   Next office visit with prescribing clinician: Visit date not found     Additional details provided by patient: PATIENT HAS ONLY 3 DAYS LEFT OF MEDICATIONS     Does the patient have less than a 3 day supply:  [x] Yes  [] No    Estela Bardales Rep   08/28/24 11:42 EDT         "

## 2024-08-29 RX ORDER — DIGOXIN 125 MCG
125 TABLET ORAL
Qty: 30 TABLET | Refills: 0 | Status: SHIPPED | OUTPATIENT
Start: 2024-08-29

## 2024-08-29 RX ORDER — SPIRONOLACTONE 25 MG/1
12.5 TABLET ORAL DAILY
Qty: 30 TABLET | Refills: 0 | Status: SHIPPED | OUTPATIENT
Start: 2024-08-29

## 2024-08-29 RX ORDER — FUROSEMIDE 20 MG
20 TABLET ORAL DAILY
Qty: 90 TABLET | Refills: 0 | Status: SHIPPED | OUTPATIENT
Start: 2024-08-29

## 2024-09-03 ENCOUNTER — TELEPHONE (OUTPATIENT)
Dept: INTERNAL MEDICINE | Facility: CLINIC | Age: 79
End: 2024-09-03
Payer: MEDICARE

## 2024-09-03 NOTE — TELEPHONE ENCOUNTER
Caller: [facility]    Relationship:     Best call back number: [phone]  CAM FRANCISCO [facility] PHYSICAL THERAPIST   What is the best time to reach you: ANYTIME     Who are you requesting to speak with (clinical staff, provider,  specific staff member): CLINICAL STAFF OR PROVIDER    [facility] IS NEEDING VERBAL ORDERS TO CONTINUE HOME PHYSICAL THERAPY. PLEASE CALL TO GIVE VERBAL CONSENT .

## 2024-09-03 NOTE — TELEPHONE ENCOUNTER
"Caller: Sallie Daily \"Kailyn\"    Relationship: Self    Best call back number: [phone]    Which medication are you concerned about:   digoxin (LANOXIN) 125 MCG tablet [2444] (Order [id number])     Who prescribed you this medication: DR. ELVIA HONG    What are your concerns: PATIENT STATES THAT SHE WAS GIVEN THE GENERIC IN THE HOSPITAL BUT A PRESCRIPTION FOR BRAND WA AS SENT TO [facility] AND IT WAS $300 A MONTH. PLEASE ADVISE    "

## 2024-09-03 NOTE — TELEPHONE ENCOUNTER
Christina spencer, was admitted to hospital on 8/23-25---has not had hospital follow up with you and does not have appointment scheduled.

## 2024-09-03 NOTE — PROGRESS NOTES
"Enter Query Response Below      Query Response: Unable to determine              If applicable, please update the problem list.     Patient: Sallie Daily \"Kailyn\"        : 1945  Account: [id number]           Admit Date: 2024        How to Respond to this query:       a. Click New Note     b. Answer query within the yellow box.                c. Update the Problem List, if applicable.      If you have any questions about this query contact me at: [email or web address]    Dr. Livingston,     Patient admitted with pneumonia.  On admission, 24 COVID-19 positive. Per H&P and progress notes \"recent COVID-19 infection\".  Treatment during admission includes PO doxycycline and IV Rocephin.      Can the patient's condition be further specified as:  -Active COVID 19 disease  -History of COVID 19 with viral shedding  -Other- specify __________  -Unable to determine    By submitting this query, we are merely seeking further clarification of documentation to accurately reflect all conditions that you are monitoring, evaluating, treating or that extend the hospitalization or utilize additional resources of care. Please utilize your independent clinical judgment when addressing the question(s) above.     This query and your response, once completed, will be entered into the legal medical record.    Sincerely,  Kellie Vasquez RN BSN  Clinical Documentation Integrity Program   "

## 2024-09-03 NOTE — TELEPHONE ENCOUNTER
"Caller: Sallie Daily \"Kailyn\"    Relationship: Self    Best call back number: [phone]    Which medication are you concerned about:   digoxin (LANOXIN) 125 MCG tablet [2444] (Order [id number])     Who prescribed you this medication: DR. ELVIA HONG    When did you start taking this medication:     What are your concerns: PATIENT STATES THAT SHE WAS PRESCRIBED THE GENERIC     How long have you had these concerns:       "

## 2024-09-04 ENCOUNTER — READMISSION MANAGEMENT (OUTPATIENT)
Dept: CALL CENTER | Facility: HOSPITAL | Age: 79
End: 2024-09-04
Payer: MEDICARE

## 2024-09-04 RX ORDER — DIGOXIN 125 MCG
125 TABLET ORAL
Qty: 45 TABLET | Refills: 1 | Status: SHIPPED | OUTPATIENT
Start: 2024-09-04

## 2024-09-04 NOTE — TELEPHONE ENCOUNTER
VO given, states that she had already received call regarding this and patient will be calling to set up hospital follow up.

## 2024-09-04 NOTE — OUTREACH NOTE
COPD/PN Week 2 Survey      Flowsheet Row Responses   [facility] patient discharged from? [facility]   Does the patient have one of the following disease processes/diagnoses(primary or secondary)? Pneumonia   Week 2 attempt successful? Yes   Call start time 1553   Call end time 1556   Discharge diagnosis Community acquired pneumonia   Meds reviewed with patient/caregiver? Yes   Is the patient having any side effects they believe may be caused by any medication additions or changes? No   Does the patient have all medications ordered at discharge? Yes   Is the patient taking all medications as directed (includes completed medication regime)? Yes   Does the patient have a primary care provider?  Yes   Has the patient kept scheduled appointments due by today? Yes   What is the Home health agency?  [facility]    Has home health visited the patient within 72 hours of discharge? Yes   Pulse Ox monitoring Intermittent   Pulse Ox device source Patient   O2 Sat: education provided Sat levels, Monitoring frequency   Psychosocial issues? No   Did the patient receive a copy of their discharge instructions? Yes   Nursing interventions Reviewed instructions with patient   What is the patient's perception of their health status since discharge? Improving   Nursing Interventions Nurse provided patient education   If the patient is a current smoker, are they able to teach back resources for cessation? Not a smoker   Is the patient/caregiver able to teach back the hierarchy of who to call/visit for symptoms/problems? PCP, Specialist, Home health nurse, Urgent Care, ED, 911 Yes   Is the patient/caregiver able to teach back signs and symptoms of worsening condition: Fever/chills, Shortness of breath, Chest pain   Is the patient/caregiver able to teach back importance of completing antibiotic course of treatment? Yes   Week 2 call completed? Yes   Call end time 1556            Janessa PINTO - Licensed Nurse

## 2024-09-05 ENCOUNTER — OFFICE VISIT (OUTPATIENT)
Dept: INTERNAL MEDICINE | Facility: CLINIC | Age: 79
End: 2024-09-05
Payer: MEDICARE

## 2024-09-05 VITALS
OXYGEN SATURATION: 94 % | WEIGHT: 139.4 LBS | HEART RATE: 66 BPM | RESPIRATION RATE: 20 BRPM | SYSTOLIC BLOOD PRESSURE: 102 MMHG | DIASTOLIC BLOOD PRESSURE: 64 MMHG | HEIGHT: 60 IN | TEMPERATURE: 97.7 F | BODY MASS INDEX: 27.37 KG/M2

## 2024-09-05 DIAGNOSIS — U07.1 PNEUMONIA DUE TO COVID-19 VIRUS: ICD-10-CM

## 2024-09-05 DIAGNOSIS — Z09 HOSPITAL DISCHARGE FOLLOW-UP: ICD-10-CM

## 2024-09-05 DIAGNOSIS — L85.3 DRY SKIN: Primary | ICD-10-CM

## 2024-09-05 DIAGNOSIS — J12.82 PNEUMONIA DUE TO COVID-19 VIRUS: ICD-10-CM

## 2024-09-05 RX ORDER — AMMONIUM LACTATE 12 G/100G
LOTION TOPICAL AS NEEDED
Qty: 225 G | Refills: 0 | Status: SHIPPED | OUTPATIENT
Start: 2024-09-05

## 2024-09-05 NOTE — PROGRESS NOTES
Subjective   Sallie Daily is a 79 y.o. female.     History of Present Illness  Patient presents for hospital follow-up from COVID-pneumonia.  States she has been doing well since being at home.  Has been monitoring her oxygen levels and states that it has been running between 93 and 94.  She has not had to use any supplemental oxygen since being home though she does have it available.  Patient has a lot of dry skin on bilateral lower extremities since having such severe swelling due to congestive heart failure.  Her legs are no longer swelling since being on medications but the dry skin is still there      The following portions of the patient's history were reviewed and updated as appropriate: allergies, current medications, past family history, past medical history, past social history, past surgical history, and problem list.    Review of Systems   All other systems reviewed and are negative.      Objective   Physical Exam  Vitals and nursing note reviewed.   Constitutional:       Appearance: Normal appearance.   HENT:      Head: Normocephalic and atraumatic.      Right Ear: External ear normal.      Left Ear: External ear normal.      Nose: Nose normal.      Mouth/Throat:      Mouth: Mucous membranes are moist.      Pharynx: Oropharynx is clear. No oropharyngeal exudate or posterior oropharyngeal erythema.   Eyes:      Extraocular Movements: Extraocular movements intact.      Conjunctiva/sclera: Conjunctivae normal.      Pupils: Pupils are equal, round, and reactive to light.   Cardiovascular:      Rate and Rhythm: Normal rate and regular rhythm.      Pulses: Normal pulses.      Heart sounds: Normal heart sounds.   Pulmonary:      Effort: Pulmonary effort is normal.      Breath sounds: Normal breath sounds.   Abdominal:      General: Abdomen is flat. Bowel sounds are normal.      Palpations: Abdomen is soft.   Musculoskeletal:         General: Normal range of motion.      Cervical back: Normal range of  motion.   Skin:     General: Skin is warm.      Capillary Refill: Capillary refill takes less than 2 seconds.   Neurological:      General: No focal deficit present.      Mental Status: She is alert and oriented to person, place, and time. Mental status is at baseline.   Psychiatric:         Mood and Affect: Mood normal.         Behavior: Behavior normal.         Thought Content: Thought content normal.         Judgment: Judgment normal.         Assessment & Plan   Diagnoses and all orders for this visit:    1. Dry skin (Primary)  -     ammonium lactate (LAC-HYDRIN) 12 % lotion; Apply  topically to the appropriate area as directed As Needed for Dry Skin.  Dispense: 225 g; Refill: 0    2. Hospital discharge follow-up    3. Pneumonia due to COVID-19 virus    Continue monitoring oxygen levels at home  AmLactin for dry skin

## 2024-09-13 ENCOUNTER — READMISSION MANAGEMENT (OUTPATIENT)
Dept: CALL CENTER | Facility: HOSPITAL | Age: 79
End: 2024-09-13
Payer: MEDICARE

## 2024-09-13 ENCOUNTER — OFFICE VISIT (OUTPATIENT)
Dept: CARDIOLOGY | Facility: CLINIC | Age: 79
End: 2024-09-13
Payer: MEDICARE

## 2024-09-13 VITALS
BODY MASS INDEX: 27.29 KG/M2 | RESPIRATION RATE: 18 BRPM | OXYGEN SATURATION: 93 % | DIASTOLIC BLOOD PRESSURE: 60 MMHG | HEART RATE: 62 BPM | HEIGHT: 60 IN | WEIGHT: 139 LBS | SYSTOLIC BLOOD PRESSURE: 128 MMHG

## 2024-09-13 DIAGNOSIS — U07.1 PNEUMONIA DUE TO COVID-19 VIRUS: ICD-10-CM

## 2024-09-13 DIAGNOSIS — I10 ESSENTIAL HYPERTENSION: ICD-10-CM

## 2024-09-13 DIAGNOSIS — Z09 HOSPITAL DISCHARGE FOLLOW-UP: ICD-10-CM

## 2024-09-13 DIAGNOSIS — J12.82 PNEUMONIA DUE TO COVID-19 VIRUS: ICD-10-CM

## 2024-09-13 DIAGNOSIS — I25.10 CORONARY ARTERY DISEASE INVOLVING NATIVE CORONARY ARTERY OF NATIVE HEART WITHOUT ANGINA PECTORIS: Primary | ICD-10-CM

## 2024-09-13 DIAGNOSIS — I50.22 CHRONIC SYSTOLIC HEART FAILURE: ICD-10-CM

## 2024-09-13 DIAGNOSIS — N18.30 STAGE 3 CHRONIC KIDNEY DISEASE, UNSPECIFIED WHETHER STAGE 3A OR 3B CKD: ICD-10-CM

## 2024-09-13 DIAGNOSIS — E78.00 PURE HYPERCHOLESTEROLEMIA: ICD-10-CM

## 2024-09-13 PROBLEM — E66.01 SEVERE OBESITY (BMI 35.0-39.9) WITH COMORBIDITY: Status: RESOLVED | Noted: 2022-11-10 | Resolved: 2024-09-13

## 2024-09-13 RX ORDER — PRAMIPEXOLE DIHYDROCHLORIDE 0.5 MG/1
0.5 TABLET ORAL
COMMUNITY

## 2024-09-13 RX ORDER — FUROSEMIDE 20 MG
20 TABLET ORAL DAILY
COMMUNITY

## 2024-09-13 RX ORDER — ALBUTEROL SULFATE 90 UG/1
AEROSOL, METERED RESPIRATORY (INHALATION)
COMMUNITY
Start: 2024-08-05

## 2024-09-13 RX ORDER — FERROUS GLUCONATE 324(38)MG
324 TABLET ORAL
COMMUNITY

## 2024-09-13 NOTE — ASSESSMENT & PLAN NOTE
-Echo 8/2024 noted EF 15-20%  -Patient was started on Entresto 24-26 mg daily, Spironolactone 25 mg daily, Digoxin 125 mcg every other day, tolerating medications changes without difficulty   -Euvolemic on exam today  -Briefly discussed that ICD may be recommended at some point and patient advised she does not want any further surgeries.  -Discussed Life Vest placement given current EF and high risk for life threatening arrhythmias, and patient advised she would research this and let us know if it is something she would like to pursue.  Given multiple upper body surgeries, size, and ambulation assistance device at baseline she advised she would probably decline placement.    -Ambulatory referral to CHF clinic to establish care, teaching, and maximization of GDMT  -Recommend recheck with Dr. Orona in 2 months for reassessment of symptoms and medication therapy, as well as further treatment plan discussion/ options.

## 2024-09-13 NOTE — ASSESSMENT & PLAN NOTE
-No longer needing supplemental oxygen  -States oxygen saturations have been stable at home 93-97%.  -Recovering well

## 2024-09-13 NOTE — OUTREACH NOTE
COPD/PN Week 3 Survey      Flowsheet Row Responses   [facility] patient discharged from? [facility]   Does the patient have one of the following disease processes/diagnoses(primary or secondary)? Pneumonia   Week 3 attempt successful? Yes   Call start time 1532   Call end time 1537   Discharge diagnosis Community acquired pneumonia   Person spoke with today (if not patient) and relationship pt   Does the patient have a primary care provider?  Yes   Did the patient receive a copy of their discharge instructions? Yes   Nursing interventions Reviewed instructions with patient   What is the patient's perception of their health status since discharge? Improving   Nursing Interventions Nurse provided patient education   Is the patient/caregiver able to teach back the hierarchy of who to call/visit for symptoms/problems? PCP, Specialist, Home health nurse, Urgent Care, ED, 911 Yes   Is the patient/caregiver able to teach back signs and symptoms of worsening condition: Fever/chills, Shortness of breath, Chest pain   Is the patient/caregiver able to teach back importance of completing antibiotic course of treatment? Yes   Week 3 call completed? Yes   Graduated Yes   Wrap up additional comments Patient reports doing well. Has some concerns for diagnosis CHF- Advised to talk to cards.   Call end time 1537            Norma WYMAN - Registered Nurse

## 2024-09-13 NOTE — PROGRESS NOTES
[facility] Cardiology    Office Consult     Sallie Daily  [id number]  09/13/2024    Referred By: No ref. provider found    Chief Complaint   Patient presents with   • Follow-up   • Congestive Heart Failure       Subjective     History of Present Illness:   Sallie Daily is a 79 y.o. female who presents to the Cardiology Clinic for hospital follow up regarding recent hospitalizations for post-covid pneumonia and acute exacerbation of congestive heart failure. The patient has known congestive heart failure with LV systolic dysfunction.  Her EF by echocardiogram in May 2020 was 35% noted during Cath procedure.  At that time, the patient suffered a periprocedure myocardial infarction during cervical spine surgery.  She underwent coronary angiography at that time demonstrating a 30% mid left main stenosis, minor luminal irregularities in the LAD, a 50% ostial circumflex stenosis, 30% distal RCA stenosis and 50% stenosis in the proximal PDA.  She underwent no coronary revascularization at that time.  The patient's proBNP here was noted greater than 70,000.  Repeat echocardiogram during hospitalization shows her left ventricular ejection fraction is now 15-20%.  There is mild mitral regurgitation.  Cardiology (Dr. Donis) was consulted during hospitalization and patient was started on guideline directed medical treatment which she has been tolerating without any difficulty. Since being discharged from the hospital she reports she has been recovering well.  She is ambulating well with her rollator and is not requiring oxygen anymore.  She has not had any recurrent swelling in her legs.  She denies chest pain and shortness of breath.  Discussed with her low EF that ICD may be recommended at some time and she responds that she will likely not pursue due to not wanting any further surgeries.  Discussed LifeVest placement and she advised that she would like to research it further but does not think  that she would be able to tolerate this.  She plans on returning to [place] in January.    Past Cardiac Testin. Last Coronary Angio:               1.  30% stenosis in the mid left main              2.  50% stenosis in the ostial LCx              3.  50% stenosis in the RPDA  2. Prior Stress Testing: MPS               1.  No evidence of inducible ischemia  3. Last Echo: 2024  1.  LVEF 15-20%, grade I diastolic dysfunction, right atrial cavity mild to moderately dilated, mild MV regurgitation and mild to moderate TV regurgitation.  Regional wall abnormalities noted.  4. Prior Holter Monitor: None    Review of Systems   Constitutional:  Negative for activity change and fatigue.   Respiratory:  Negative for chest tightness and shortness of breath.    Cardiovascular:  Negative for chest pain, palpitations and leg swelling.   Neurological:  Negative for dizziness.   All other systems reviewed and are negative.       Past Medical History:   Diagnosis Date   • Abnormal ECG 2021    Post neck surgery   • Allergic Sulfa   • Anemia    • Arthritis    • Asthma 24    Couldn’t breathe   • Cataract Removed   • Cellulitis    • CHF (congestive heart failure)    • Chronic kidney disease 2002   • Colon polyp    • Coronary artery disease     Congestion after neck surgery led to pneumonia which led to heart “heart attack”   • Diverticulitis    • Diverticulosis Several   • GERD (gastroesophageal reflux disease)    • Gout    • Heart attack     3/2021  mi   • HL (hearing loss)    • Hyperlipidemia    • Hypertension    • Hyperthyroidism Caught early   • Inflammatory bowel disease [employer] device   • Low back pain    • Obesity    • Osteoporosis    • Pneumonia    • Renal insufficiency    • Sleep apnea    • Thyroid disease    • Urinary tract infection Once   • Visual impairment Nearsighted       Past Surgical History:   Procedure Laterality Date   • CAROTID ENDARTERECTOMY     • COLON SURGERY  1.5  ft removed   • COLONOSCOPY      2022, q1yr , [facility]   • EYE SURGERY  Cataract/lens   • HERNIA REPAIR  5 times   • HYSTERECTOMY   ?   • JOINT REPLACEMENT  Knee, rotator cuff x 2   • NECK SURGERY     • REPLACEMENT TOTAL KNEE     • ROTATOR CUFF REPAIR     • SINUS SURGERY     • SPINE SURGERY  Upper spine   • TONSILLECTOMY   ?   • TUBAL ABDOMINAL LIGATION         Family History   Problem Relation Age of Onset   • Thyroid disease Mother    • Osteoporosis Mother    • Obesity Mother    • Heart attack Mother    • Arthritis Mother         Age 98 as of 2024   • Obesity Father    • Heart attack Father    • Cancer Father          2019 at age. 89+, had Parkinson’s disease   • Thyroid disease Sister    • Thyroid disease Sister    • Thyroid disease Daughter        Social History     Socioeconomic History   • Marital status:    Tobacco Use   • Smoking status: Former     Current packs/day: 0.00     Average packs/day: 1 pack/day for 42.1 years (42.1 ttl pk-yrs)     Types: Cigarettes     Start date: 10/1/1963     Quit date: 2005     Years since quittin.8   • Smokeless tobacco: Never   Vaping Use   • Vaping status: Never Used   Substance and Sexual Activity   • Alcohol use: Not Currently     Comment: Glass of wine or beer occasionally   • Drug use: Never   • Sexual activity: Not Currently     Partners: Male     Birth control/protection: Tubal ligation, Birth control pill, Hysterectomy, Pill         Current Outpatient Medications:   •  albuterol sulfate  (90 Base) MCG/ACT inhaler, , Disp: , Rfl:   •  ammonium lactate (LAC-HYDRIN) 12 % lotion, Apply  topically to the appropriate area as directed As Needed for Dry Skin., Disp: 225 g, Rfl: 0  •  ascorbic acid (VITAMIN C) 250 MG chewable tablet chewable tablet, Chew 1 tablet., Disp: , Rfl:   •  carvedilol (COREG) 25 MG tablet, TAKE 1 TABLET BY MOUTH  TWICE DAILY WITH MEALS, Disp: 180 tablet, Rfl: 0  •  digoxin (LANOXIN) 125  "MCG tablet, Take 1 tablet by mouth Every Other Day., Disp: 45 tablet, Rfl: 1  •  diphenhydrAMINE-acetaminophen (TYLENOL PM)  MG tablet per tablet, Take 1 tablet by mouth., Disp: , Rfl:   •  ferrous gluconate (FERGON) 324 MG tablet, Take 1 tablet by mouth., Disp: , Rfl:   •  furosemide (LASIX) 20 MG tablet, Take 1 tablet by mouth Daily., Disp: , Rfl:   •  levothyroxine (SYNTHROID, LEVOTHROID) 150 MCG tablet, Take 1 tablet by mouth Daily., Disp: , Rfl:   •  loperamide (IMODIUM) 2 MG capsule, Take 1 capsule by mouth 4 (Four) Times a Day As Needed for Diarrhea., Disp: , Rfl:   •  Melatonin 10 MG capsule, melatonin 10 mg capsule  Take 1 capsule every day by oral route., Disp: , Rfl:   •  pramipexole (MIRAPEX) 0.5 MG tablet, Take 1 tablet by mouth., Disp: , Rfl:   •  sacubitril-valsartan (ENTRESTO) 24-26 MG tablet, Take 1 tablet by mouth Every 12 (Twelve) Hours., Disp: 180 tablet, Rfl: 1  •  sertraline (ZOLOFT) 100 MG tablet, Zoloft 100 mg tablet  Take 1 tablet every day by oral route., Disp: , Rfl:   •  spironolactone (ALDACTONE) 25 MG tablet, Take 0.5 tablets by mouth Daily., Disp: 30 tablet, Rfl: 0      Allergies   Allergen Reactions   • Sulfa Antibiotics Hives and Other (See Comments)   • Statins Swelling       Objective     Vitals:    09/13/24 1121   BP: 128/60   BP Location: Right arm   Patient Position: Sitting   Cuff Size: Adult   Pulse: 62   Resp: 18   SpO2: 93%   Weight: 63 kg (139 lb)   Height: 152.4 cm (60\")     Body mass index is 27.15 kg/m².    Physical Exam  Vitals and nursing note reviewed.   Constitutional:       General: She is not in acute distress.     Appearance: Normal appearance. She is not toxic-appearing.      Comments: Elderly female, utilizing rollator with ambulation   HENT:      Head: Normocephalic.      Mouth/Throat:      Mouth: Mucous membranes are moist.   Eyes:      Pupils: Pupils are equal, round, and reactive to light.   Cardiovascular:      Rate and Rhythm: Normal rate and regular " rhythm.      Pulses: Normal pulses.      Heart sounds: Normal heart sounds. No murmur heard.  Pulmonary:      Effort: Pulmonary effort is normal.      Breath sounds: Normal breath sounds. No wheezing, rhonchi or rales.   Musculoskeletal:      Right lower leg: No edema.      Left lower leg: No edema.   Skin:     General: Skin is warm and dry.      Capillary Refill: Capillary refill takes less than 2 seconds.   Neurological:      Mental Status: She is alert and oriented to person, place, and time. Mental status is at baseline.   Psychiatric:         Mood and Affect: Mood normal.         Behavior: Behavior normal.         Thought Content: Thought content normal.         Results Review:   I reviewed the patient's new clinical results.  I reviewed the patient's other test results and agree with the interpretation    Procedures    Assessment & Plan  Hospital discharge follow-up  -Recovering well after recent hospitalizations    Chronic systolic heart failure  -Echo 8/2024 noted EF 15-20%  -Patient was started on Entresto 24-26 mg daily, Spironolactone 25 mg daily, Digoxin 125 mcg every other day, tolerating medications changes without difficulty   -Euvolemic on exam today  -Briefly discussed that ICD may be recommended at some point and patient advised she does not want any further surgeries.  -Discussed Life Vest placement given current EF and high risk for life threatening arrhythmias, and patient advised she would research this and let us know if it is something she would like to pursue.  Given multiple upper body surgeries, size, and ambulation assistance device at baseline she advised she would probably decline placement.    -Ambulatory referral to CHF clinic to establish care, teaching, and maximization of GDMT  -Recommend recheck with Dr. Orona in 2 months for reassessment of symptoms and medication therapy, as well as further treatment plan discussion/ options.  Coronary artery disease involving native coronary  artery of native heart without angina pectoris  -History of mild to moderate nonobstructive coronary artery disease on coronary angiogram in 2019  -Denies chest pain or exertional anginal symptoms  -Continue  ASA therapy  -Patient reports she quit taking her statin therapy due to leg swelling and leg pain which resolved after stopping statin.  Discussed it would be highly recommended given known CAD.  Recommend to recheck a lipid panel in 6 months  Essential hypertension  -Blood pressure well controlled today  -Continue current medications    Pneumonia due to COVID-19 virus  -No longer needing supplemental oxygen  -States oxygen saturations have been stable at home 93-97%.  -Recovering well  Stage 3 chronic kidney disease, unspecified whether stage 3a or 3b CKD  -Creatinine 1.25 with labs in August  -Established care with nephrology  Pure hypercholesterolemia   -Quit taking statin therapy in recent months due to myalgias and reported leg swelling  -Discussed importance of statin therapy.  She would like to recheck lipid panel in 6 months and reassess at that time.    Orders Placed This Encounter   Procedures   • Ambulatory Referral to Disease State Management        Preventative Cardiology:   Tobacco Cessation: N/A   Advance Care Planning: ACP discussion was held with the patient during this visit. Patient does not have an advance directive, declines further assistance.     Follow Up:   Return in about 2 months (around 11/13/2024) for Next scheduled follow up--Needs appt with Dr. Orona.      Thank you for allowing me to participate in the care of your patient. Please to not hesitate to contact me with additional questions or concerns.     Yasmine Watson, APRN

## 2024-09-13 NOTE — ASSESSMENT & PLAN NOTE
-History of mild to moderate nonobstructive coronary artery disease on coronary angiogram in 2019  -Denies chest pain or exertional anginal symptoms  -Continue  ASA therapy  -Patient reports she quit taking her statin therapy due to leg swelling and leg pain which resolved after stopping statin.  Discussed it would be highly recommended given known CAD.  Recommend to recheck a lipid panel in 6 months

## 2024-09-13 NOTE — ASSESSMENT & PLAN NOTE
-Quit taking statin therapy in recent months due to myalgias and reported leg swelling  -Discussed importance of statin therapy.  She would like to recheck lipid panel in 6 months and reassess at that time.

## 2024-09-17 ENCOUNTER — OUTSIDE FACILITY SERVICE (OUTPATIENT)
Dept: INTERNAL MEDICINE | Facility: CLINIC | Age: 79
End: 2024-09-17
Payer: MEDICARE

## 2024-10-01 ENCOUNTER — TELEPHONE (OUTPATIENT)
Dept: INTERNAL MEDICINE | Facility: CLINIC | Age: 79
End: 2024-10-01
Payer: MEDICARE

## 2024-10-01 NOTE — TELEPHONE ENCOUNTER
Caller: CAM    Relationship:     Best call back number: [phone]    What was the call regarding: CAM CALLED TO REQUEST TWO ADDITIONAL PHYSICAL THERAPY VISITS  FOR THE PATIENT'S BALANCE.

## 2024-10-02 ENCOUNTER — TELEPHONE (OUTPATIENT)
Dept: INTERNAL MEDICINE | Facility: CLINIC | Age: 79
End: 2024-10-02
Payer: MEDICARE

## 2024-10-02 NOTE — TELEPHONE ENCOUNTER
CAM FROM [facility] CALLED TO SAY THAT THE PATIENT HAD A FALL TODAY.  THE PATIENT SAID SHE IS FINE. SHE WAS DOING A SQUAT AND FELL.  CAM AT [facility] STATED SHE WOULD CALL BACK  AND CHECK ON THE PATIENT.  THIS IS JUST AN FYI FORM  [phone]

## 2024-10-07 ENCOUNTER — DISEASE STATE MANAGEMENT VISIT (OUTPATIENT)
Dept: PHARMACY | Facility: HOSPITAL | Age: 79
End: 2024-10-07
Payer: MEDICARE

## 2024-10-07 VITALS
WEIGHT: 139 LBS | TEMPERATURE: 98.4 F | RESPIRATION RATE: 16 BRPM | HEIGHT: 60 IN | SYSTOLIC BLOOD PRESSURE: 139 MMHG | OXYGEN SATURATION: 96 % | DIASTOLIC BLOOD PRESSURE: 58 MMHG | HEART RATE: 56 BPM | BODY MASS INDEX: 27.29 KG/M2

## 2024-10-07 DIAGNOSIS — I25.10 CORONARY ARTERY DISEASE INVOLVING NATIVE CORONARY ARTERY OF NATIVE HEART WITHOUT ANGINA PECTORIS: ICD-10-CM

## 2024-10-07 DIAGNOSIS — I10 ESSENTIAL HYPERTENSION: ICD-10-CM

## 2024-10-07 DIAGNOSIS — E78.00 PURE HYPERCHOLESTEROLEMIA: ICD-10-CM

## 2024-10-07 DIAGNOSIS — I50.22 CHRONIC SYSTOLIC HEART FAILURE: Primary | ICD-10-CM

## 2024-10-07 PROCEDURE — G0463 HOSPITAL OUTPT CLINIC VISIT: HCPCS

## 2024-10-07 RX ORDER — LEVOCETIRIZINE DIHYDROCHLORIDE 5 MG/1
5 TABLET, FILM COATED ORAL EVERY EVENING
COMMUNITY

## 2024-10-07 NOTE — PROGRESS NOTES
[facility]  Heart Failure Pharmacist Note     Patient Name: Sallie Daily  :1945  Age: 79 y.o.  Sex: female  Referring Provider: Yasmine Watson APRN   Primary Cardiologist: Dr. Orona  Encounter Provider:  ROMINA Salgado    HPI: Patient presents for initial evaluation in heart failure clinic for management of HFrEF, (EF = 15-20%) 2024. Patient presented to the clinic today with her daughter. PMH otherwise significant for CKD stage III, Hypercholesterolemia, CAD, HTN.    Patient reports that she is feeling good today. She was recently admitted 2024- 2024 for community acquired PNA post covid, CHF not in exacerbation. She denies chest pain, dizziness, palpitations, lightheadedness, abdominal or lower extremity swelling. She reports that she coughs up phlegm and attributes it to allergies. She does take her BP and weight at home daily. She was unable to locate her records on the gt she uses, and was given a paper weight, BP and HR log. She plans to write down her numbers as well and will bring to her next appointment. Patient reports that she resumed furosemide 20mg daily one week post her hospital admission. She also reports that she had muscle cramps and self stopped statin therapy. She reports that she tried two different statins. Patient endorses compliance with her medications.     Discussed options of a life vest today, and patient declines at this time. She will reach out if she changes her mind. Information was provided for an advanced directive today. She plans to move to [place] in January and reports she has a cardiologist that she is established with there as well.     Heart Failure GDMT:    Drug Class   Drug   Dose Last Dose Adjustment   Notes   ACEi/ARB/ARNI Entresto  24-26mg BID     Beta Blocker Carvedilol 25mg BID     MRA Spironolactone 12.5mg     SGLT2i Jardiance 10mg 10/7/2024      Other CV Meds:  Furosemide 20mg QD    Medication Use:  Adherence:    Past hx of medication use/intolerance:  Affordability:    Social Determinants:  Social Determinants of Health     Tobacco Use: Medium Risk (10/7/2024)    Patient History     Smoking Tobacco Use: Former     Smokeless Tobacco Use: Never     Passive Exposure: Not on file   Alcohol Use: Not At Risk (8/24/2024)    AUDIT-C     Frequency of Alcohol Consumption: Never     Average Number of Drinks: Patient does not drink     Frequency of Binge Drinking: Never   Financial Resource Strain: Low Risk  (8/24/2024)    Overall Financial Resource Strain (CARDIA)     Difficulty of Paying Living Expenses: Not hard at all   Food Insecurity: No Food Insecurity (8/24/2024)    Hunger Vital Sign     Worried About Running Out of Food in the Last Year: Never true     Ran Out of Food in the Last Year: Never true   Transportation Needs: No Transportation Needs (8/24/2024)    PRAPARE - Transportation     Lack of Transportation (Medical): No     Lack of Transportation (Non-Medical): No   Physical Activity: Inactive (8/24/2024)    Exercise Vital Sign     Days of Exercise per Week: 0 days     Minutes of Exercise per Session: 0 min   Stress: No Stress Concern Present (8/24/2024)    [employer] - Occupational Stress Questionnaire     Feeling of Stress : Not at all   Social Connections: Not At Risk (8/24/2024)    Family and Community Support     Help with Day-to-Day Activities: I don't need any help     Lonely or Isolated: Never   Interpersonal Safety: Not At Risk (8/23/2024)    Abuse Screen     Unsafe at Home or Work/School: no     Feels Threatened by Someone?: no     Does Anyone Keep You from Contacting Others or Doint Things Outside the Home?: no     Physical Sign of Abuse Present: no   Depression: Not at risk (8/24/2024)    PHQ-2     PHQ-2 Score: 0   Housing Stability: Not At Risk (8/24/2024)    Housing Stability     Current Living Arrangements: home     Potentially Unsafe Housing Conditions: none   Utilities: Not  "At Risk (8/24/2024)    [facility] Utilities     Threatened with loss of utilities: No   Health Literacy: Not At Risk (8/24/2024)    Education     Help with school or training?: No     Preferred Language: English   Employment: Not At Risk (8/24/2024)    Employment     Do you want help finding or keeping work or a job?: I do not need or want help   Disabilities: At Risk (8/24/2024)    Disabilities     Concentrating, Remembering, or Making Decisions Difficulty: no     Doing Errands Independently Difficulty: yes       Immunization Status:  Pneumococcal:   Influenza:   COVID-19:     OBJECTIVE:  /58 (BP Location: Right arm, Patient Position: Sitting, Cuff Size: Adult)   Pulse 56   Temp 98.4 °F (36.9 °C) (Infrared)   Resp 16   Ht 152.4 cm (60\")   Wt 63 kg (139 lb)   SpO2 96% Comment: RA  BMI 27.15 kg/m²     Body mass index is 27.15 kg/m².  Wt Readings from Last 1 Encounters:   10/07/24 63 kg (139 lb)       10-yr ASCVD risk: The ASCVD Risk score (Ene DEAN, et al., 2019) failed to calculate for the following reasons:    Cannot find a previous HDL lab    Cannot find a previous total cholesterol lab    Lab Review:  Renal Function: CrCl cannot be calculated (Patient's most recent lab result is older than the maximum 30 days allowed.).    Lab Results   Component Value Date    PROBNP 17,372.0 (H) 08/23/2024       Results for orders placed during the hospital encounter of 08/11/24    Adult Transthoracic Echo Complete w/ Color, Spectral and Contrast if necessary per protocol    Interpretation Summary    Left ventricular systolic function is severely decreased. Left ventricular ejection fraction appears to 15-20%. The left ventricular cavity is mildly dilated. Grade I diastolic dysfunction present.    Regional wall motion abnormalities as below.  No left ventricular thrombus noted.    Normal right ventricular size with borderline systolic function.    The right atrial cavity is mild to moderately  dilated.    Mild mitral " "valve regurgitation is present.    Mild to moderate tricuspid valve regurgitation is present. Estimated right ventricular systolic pressure from tricuspid regurgitation is markedly elevated (>55 mmHg).    There is a small left pleural effusion.        6 MINUTE WALK                      Does patient have a home BP monitor? Yes  Does patient have a home scale? Yes    Patient Education:  Sallie Daily was provided written and verbal education during their clinic visit today. Topics reviewed include:  The basics of heart failure (defining heart failure, ejection fraction, stages of disease)  Signs and symptoms of heart failure, self track zones, and when to contact clinic or seek emergency care  Overview of heart failure treatment plan (pharmacologic and non-pharmacologic) and goals of treatment  Tips for success with managing heart failure medications  List of medications that may be used to treat heart failure and how they work in the body    Patient was given a heart failure tracker calendar to document weight, blood pressure, and symptoms/overall feeling each day. Patient was encouraged to complete this daily in order to help guide therapy adjustments. Sallie Daily expressed understanding.    Sallie Daily was provided written and verbal education during their clinic visit today. Topics reviewed include:  Dietary modifications that can improve heart health  Quick tips for limiting sodium intake (<2000 mg/day), including foods to look for at the grocery store and making smart choices when eating out  \"[employer]\"  How to read a nutrition label  Managing fluid intake (<1500 mL/day)  Reinforced education provided at previous visit     ASSESSMENT/PLAN:  HFrEF (EF = 15-20%)  Start Jardiance 10mg daily  Continue Carvedilol 25mg BID with food  Continue Spironolactone 12.5mg   Continue Entresto 24/26mg BID  Continue Furosemide 20mg Daily.  Advised patient to call clinic with 2-3 lb weight gain in 24 hrs or " >5 lb weight gain in 1 week.      Regina Peterson, PharmD  [facility]  10/07/24  15:07 EDT

## 2024-10-07 NOTE — PATIENT INSTRUCTIONS
YOU ARE STARTING JARDIANCE TODAY.  AS WE DISCUSSED, THIS MEDICATION CAN INCREASE YOUR RISK FOR URINARY TRACT INFECTIONS.  IF YOU HAVE ANY SYMPTOMS OF A UTI (BLOOD IN URINE, PAIN WITH URINATION, ACUTE/SUDDEN LOW BACK PAIN, INCREASE URINARY FREQUENCY WITH SMALL AMOUNTS OF URINE), STOP THIS MEDICATION AND CALL OUR OFFICE.  THERE IS ALSO A SMALL BUT SERIOUS RISK OF A SKIN INFECTION.  CALL OUR OFFICE IF YOU HAVE CONCERNS WITH THIS.

## 2024-10-07 NOTE — PROGRESS NOTES
[facility] Consult Note    Sallie Daily  [id number]  10/07/2024    Referred By: Yasmine Watson APRN    Chief Complaint: Initial evaluation    History of Present Illness:   Mrs. Sallie Daily is a 79 y.o. female who presents to the HF Clinic for initial evaluation.  The patient has a past medical history of CAD, HTN, HLD, and HFrEF dating back to  (visually estimated EF of 35% per coronary angiography).  In , she was admitted with acute on chronic heart failure (pro BNP >70,000, CXR showed cardiomegaly and pulmonary vascular congestion).  During this admission, she was also diagnosed with COVID-19 at which time her CXR showed left lung atelectasis versus pneumonia.  She was treated with dexamethasone and Lasix.  A subsequent echocardiogram showed an LVEF of 15-20%.  She was ultimately discharged on Entresto, spironolactone, digoxin, and daily Lasix.  A couple weeks after hospital discharge, she was readmitted with bacterial pneumonia.  She presents today for follow-up.  The patient reports feeling well today.  She specifically denies chest pain and dyspnea.  She reports a cough related to phlegm.  She denies palpitations, dizziness, and, lower extremity edema.  Reports having 1 UTI a couple years ago.  She sleeps on 1 pillow.  She reports keeping a record of her blood pressure and weight daily but is unable to find this on her phone gt after recent software update.  There are no other cardiac complaints or concerns at this time.    Past Cardiac Testin. Last Coronary Angio: SCANNED - CARDIOLOGY (2021)   2. Prior Stress Testing: SCANNED - NUCLEAR STRESS (2020)   3. Last Echo: 2024    Left ventricular systolic function is severely decreased. Left ventricular ejection fraction appears to 15-20%. The left ventricular cavity is mildly dilated. Grade I diastolic dysfunction present.    Regional wall motion abnormalities as below.  No left  ventricular thrombus noted.    Normal right ventricular size with borderline systolic function.    The right atrial cavity is mild to moderately  dilated.    Mild mitral valve regurgitation is present.    Mild to moderate tricuspid valve regurgitation is present. Estimated right ventricular systolic pressure from tricuspid regurgitation is markedly elevated (>55 mmHg).    There is a small left pleural effusion.  4. Prior Holter Monitor: None    Review of Systems:   Review of Systems   As Noted in HPI.   I have reviewed and confirmed the accuracy of the ROS as documented by the MA/LPN/RN ROMINA Salgado      Past Medical History:   Past Medical History:   Diagnosis Date    Abnormal ECG 11/2021    Post neck surgery    Allergic Sulfa    Anemia     Arthritis     Asthma 08/11/24    Couldn’t breathe    Cataract Removed    Cellulitis     CHF (congestive heart failure) 11/21    Chronic kidney disease 1/2002    Colon polyp     Coronary artery disease 2021    Congestion after neck surgery led to pneumonia which led to heart “heart attack”    Diverticulitis     Diverticulosis Several    GERD (gastroesophageal reflux disease)     Gout     Heart attack     3/2021  mi    HL (hearing loss)     Hyperlipidemia     Hypertension     Hyperthyroidism Caught early    Inflammatory bowel disease [employer] device    Low back pain     Obesity     Osteoporosis     Pneumonia     Renal insufficiency 02/24    Sleep apnea 2010    Thyroid disease     Urinary tract infection Once    Visual impairment Nearsighted       Past Surgical History:   Past Surgical History:   Procedure Laterality Date    CAROTID ENDARTERECTOMY  2011    COLON SURGERY  1.5 ft removed    COLONOSCOPY      2/2022, q1yr , [facility]    EYE SURGERY  Cataract/lens    HERNIA REPAIR  5 times    HYSTERECTOMY  1990 ?    JOINT REPLACEMENT  Knee, rotator cuff x 2    NECK SURGERY      REPLACEMENT TOTAL KNEE      ROTATOR CUFF REPAIR      SINUS SURGERY  2021    SPINE  SURGERY  Upper spine    TONSILLECTOMY  1950 ?    TUBAL ABDOMINAL LIGATION         Family History:   Family History   Problem Relation Age of Onset    Thyroid disease Mother     Osteoporosis Mother     Obesity Mother     Heart attack Mother     Arthritis Mother         Age 98 as of 2024    Obesity Father     Heart attack Father     Cancer Father          2019 at age. 89+, had Parkinson’s disease    Thyroid disease Sister     Thyroid disease Sister     Thyroid disease Daughter        Social History:   Social History     Socioeconomic History    Marital status:    Tobacco Use    Smoking status: Former     Current packs/day: 0.00     Average packs/day: 1 pack/day for 42.1 years (42.1 ttl pk-yrs)     Types: Cigarettes     Start date: 10/1/1963     Quit date: 2005     Years since quittin.9    Smokeless tobacco: Never   Vaping Use    Vaping status: Never Used   Substance and Sexual Activity    Alcohol use: Not Currently     Comment: Glass of wine or beer occasionally    Drug use: Never    Sexual activity: Not Currently     Partners: Male     Birth control/protection: Tubal ligation, Birth control pill, Hysterectomy, Pill       Medications:     Current Outpatient Medications:     albuterol sulfate  (90 Base) MCG/ACT inhaler, , Disp: , Rfl:     ammonium lactate (LAC-HYDRIN) 12 % lotion, Apply  topically to the appropriate area as directed As Needed for Dry Skin., Disp: 225 g, Rfl: 0    ascorbic acid (VITAMIN C) 250 MG chewable tablet chewable tablet, Chew 1 tablet., Disp: , Rfl:     carvedilol (COREG) 25 MG tablet, TAKE 1 TABLET BY MOUTH  TWICE DAILY WITH MEALS, Disp: 180 tablet, Rfl: 0    digoxin (LANOXIN) 125 MCG tablet, Take 1 tablet by mouth Every Other Day., Disp: 45 tablet, Rfl: 1    diphenhydrAMINE-acetaminophen (TYLENOL PM)  MG tablet per tablet, Take 1 tablet by mouth., Disp: , Rfl:     ferrous gluconate (FERGON) 324 MG tablet, Take 1 tablet by mouth., Disp: , Rfl:      "furosemide (LASIX) 20 MG tablet, Take 1 tablet by mouth Daily., Disp: , Rfl:     levocetirizine (XYZAL) 5 MG tablet, Take 1 tablet by mouth Every Evening., Disp: , Rfl:     levothyroxine (SYNTHROID, LEVOTHROID) 150 MCG tablet, Take 1 tablet by mouth Daily., Disp: , Rfl:     loperamide (IMODIUM) 2 MG capsule, Take 1 capsule by mouth 4 (Four) Times a Day As Needed for Diarrhea., Disp: , Rfl:     [employer] Blood Builder tablet, Take  by mouth., Disp: , Rfl:     Melatonin 10 MG capsule, melatonin 10 mg capsule  Take 1 capsule every day by oral route., Disp: , Rfl:     pramipexole (MIRAPEX) 0.5 MG tablet, Take 1 tablet by mouth., Disp: , Rfl:     sacubitril-valsartan (ENTRESTO) 24-26 MG tablet, Take 1 tablet by mouth Every 12 (Twelve) Hours., Disp: 180 tablet, Rfl: 1    sertraline (ZOLOFT) 100 MG tablet, Zoloft 100 mg tablet  Take 1 tablet every day by oral route., Disp: , Rfl:     spironolactone (ALDACTONE) 25 MG tablet, Take 0.5 tablets by mouth Daily., Disp: 30 tablet, Rfl: 0    Vitamins-Lipotropics (LIPO FLAVONOID PLUS PO), Take  by mouth., Disp: , Rfl:     empagliflozin (JARDIANCE) 10 MG tablet tablet, Take 1 tablet by mouth Daily., Disp: 30 tablet, Rfl: 0    Allergies:   Allergies   Allergen Reactions    Sulfa Antibiotics Hives and Other (See Comments)    Statins Swelling       Physical Exam:  Vital Signs:   Vitals:    10/07/24 1301   BP: 139/58   BP Location: Right arm   Patient Position: Sitting   Cuff Size: Adult   Pulse: 56   Resp: 16   Temp: 98.4 °F (36.9 °C)   TempSrc: Infrared   SpO2: 96%  Comment: RA   Weight: 63 kg (139 lb)   Height: 152.4 cm (60\")     Body mass index is 27.15 kg/m².    Physical Exam  Vitals and nursing note reviewed.   Constitutional:       General: She is not in acute distress.  HENT:      Head: Normocephalic and atraumatic.   Neck:      Trachea: Trachea normal.   Cardiovascular:      Rate and Rhythm: Regular rhythm. Bradycardia present.      Pulses: Normal pulses.      Heart sounds: " Normal heart sounds. No murmur heard.     No friction rub. No gallop.   Pulmonary:      Effort: Pulmonary effort is normal.      Breath sounds: Normal breath sounds.   Musculoskeletal:      Cervical back: Neck supple.      Right lower leg: No edema.      Left lower leg: No edema.   Skin:     General: Skin is warm and dry.   Neurological:      Mental Status: She is alert and oriented to person, place, and time.   Psychiatric:         Mood and Affect: Mood normal.         Behavior: Behavior normal. Behavior is cooperative.         Thought Content: Thought content does not include suicidal ideation.       .  Results Review:   I reviewed the patient's new clinical results.    Procedures    Assessment / Plan:   Diagnoses and all orders for this visit:    1. Chronic systolic heart failure (Primary)   1 mo ago  8/23/24 1 mo ago  8/11/24   proBNP  0.0 - 1,800.0 pg/mL 17,372.0 >70,000.0   Resulting Agency [facility]     8/24, LVEF 15-20%, grade 1 diastolic dysfunction  Stage C  NYHA functional class II  Continue carvedilol, spironolactone, and Entresto at current doses for now  empagliflozin (JARDIANCE) 10 MG tablet tablet; Take 1 tablet by mouth Daily.  Dispense: 30 tablet; Refill: 0  Patient advised to keep daily weight log and to call our office with >2-3 lb weight gain overnight or >5 lbs in a week's time  Patient given Heart Failure Handbook, AHA BP log, AHA Heart Failure daily symptom tracker    2. Coronary artery disease involving native coronary artery of native heart without angina pectoris  5/21, nonobstructive CAD per coronary angiography  Discuss low-dose aspirin on follow-up    3. Essential hypertension  Less than ideal  Patient encouraged to keep a BP/HR log and bring this to follow-up appointment for review    4. Pure hypercholesterolemia  Patient reports statin intolerance  She is uninterested in Rx at this time    5. CKD  8/24, Creatinine 1.25  Ongoing f/u with [facility]      Patient  Instructions   YOU ARE STARTING JARDIANCE TODAY.  AS WE DISCUSSED, THIS MEDICATION CAN INCREASE YOUR RISK FOR URINARY TRACT INFECTIONS.  IF YOU HAVE ANY SYMPTOMS OF A UTI (BLOOD IN URINE, PAIN WITH URINATION, ACUTE/SUDDEN LOW BACK PAIN, INCREASE URINARY FREQUENCY WITH SMALL AMOUNTS OF URINE), STOP THIS MEDICATION AND CALL OUR OFFICE.  THERE IS ALSO A SMALL BUT SERIOUS RISK OF A SKIN INFECTION.  CALL OUR OFFICE IF YOU HAVE CONCERNS WITH THIS.             Preventative Cardiology:   Tobacco Cessation: N/A   Advance Care Planning: ACP discussion was declined by the patient. Patient does not have an advance directive, information provided.       Follow Up:   Return for 3-4 WEEKS WITH DSM.      Thank you for allowing me to participate in the care of your patient. Please to not hesitate to contact me with additional questions or concerns.     ROMINA Barton

## 2024-10-10 PROBLEM — I50.9 ACUTE EXACERBATION OF CHF (CONGESTIVE HEART FAILURE): Status: RESOLVED | Noted: 2024-08-11 | Resolved: 2024-10-10

## 2024-10-21 RX ORDER — LOPERAMIDE HCL 2 MG
2 CAPSULE ORAL 4 TIMES DAILY PRN
Qty: 180 CAPSULE | Refills: 1 | Status: SHIPPED | OUTPATIENT
Start: 2024-10-21

## 2024-10-23 ENCOUNTER — TELEPHONE (OUTPATIENT)
Dept: INTERNAL MEDICINE | Facility: CLINIC | Age: 79
End: 2024-10-23
Payer: MEDICARE

## 2024-10-23 NOTE — TELEPHONE ENCOUNTER
Caller: CAM    Relationship:     Best call back number: [phone]    What was the call regarding: VERBAL ORDERS TO CONTINUE PHYSICAL THERAPY.

## 2024-10-29 ENCOUNTER — OUTSIDE FACILITY SERVICE (OUTPATIENT)
Dept: INTERNAL MEDICINE | Facility: CLINIC | Age: 79
End: 2024-10-29
Payer: MEDICARE

## 2024-10-29 RX ORDER — FUROSEMIDE 20 MG/1
20 TABLET ORAL DAILY
Qty: 90 TABLET | Refills: 3 | Status: SHIPPED | OUTPATIENT
Start: 2024-10-29

## 2024-11-01 DIAGNOSIS — I10 BENIGN ESSENTIAL HYPERTENSION: ICD-10-CM

## 2024-11-01 RX ORDER — CARVEDILOL 25 MG/1
25 TABLET ORAL 2 TIMES DAILY WITH MEALS
Qty: 180 TABLET | Refills: 0 | Status: SHIPPED | OUTPATIENT
Start: 2024-11-01

## 2024-11-01 RX ORDER — PRAMIPEXOLE DIHYDROCHLORIDE 0.5 MG/1
0.5 TABLET ORAL 3 TIMES DAILY
Qty: 270 TABLET | Refills: 1 | Status: SHIPPED | OUTPATIENT
Start: 2024-11-01

## 2024-11-04 ENCOUNTER — PATIENT MESSAGE (OUTPATIENT)
Dept: INTERNAL MEDICINE | Facility: CLINIC | Age: 79
End: 2024-11-04
Payer: MEDICARE

## 2024-11-05 RX ORDER — LOPERAMIDE HYDROCHLORIDE 2 MG/1
2 CAPSULE ORAL 4 TIMES DAILY PRN
Qty: 180 CAPSULE | Refills: 1 | Status: SHIPPED | OUTPATIENT
Start: 2024-11-05

## 2024-11-05 RX ORDER — SPIRONOLACTONE 25 MG/1
12.5 TABLET ORAL DAILY
Qty: 45 TABLET | Refills: 1 | Status: SHIPPED | OUTPATIENT
Start: 2024-11-05

## 2024-11-05 RX ORDER — SERTRALINE HYDROCHLORIDE 100 MG/1
100 TABLET, FILM COATED ORAL DAILY
Qty: 90 TABLET | Refills: 1 | Status: SHIPPED | OUTPATIENT
Start: 2024-11-05

## 2024-11-05 RX ORDER — LEVOTHYROXINE SODIUM 175 UG/1
175 TABLET ORAL DAILY
Qty: 90 TABLET | Refills: 1 | Status: SHIPPED | OUTPATIENT
Start: 2024-11-05

## 2024-11-05 NOTE — TELEPHONE ENCOUNTER
Rx Refill Note  Requested Prescriptions     Pending Prescriptions Disp Refills    sertraline (ZOLOFT) 100 MG tablet 90 tablet 1     Sig: Take 1 tablet by mouth Daily.    spironolactone (ALDACTONE) 25 MG tablet 45 tablet 1     Sig: Take 0.5 tablets by mouth Daily.    levothyroxine (SYNTHROID, LEVOTHROID) 175 MCG tablet 90 tablet 1     Sig: Take 1 tablet by mouth Daily.    loperamide (IMODIUM) 2 MG capsule 180 capsule 1     Sig: Take 1 capsule by mouth 4 (Four) Times a Day As Needed for Diarrhea.      Last office visit with prescribing clinician: 9/5/2024   Last telemedicine visit with prescribing clinician: 8/22/2024   Next office visit with prescribing clinician: Visit date not found                         Elisa Garrett MA  11/05/24, 12:53 EST

## 2024-11-06 ENCOUNTER — OFFICE VISIT (OUTPATIENT)
Dept: CARDIOLOGY | Facility: CLINIC | Age: 79
End: 2024-11-06
Payer: MEDICARE

## 2024-11-06 VITALS
HEART RATE: 58 BPM | DIASTOLIC BLOOD PRESSURE: 66 MMHG | BODY MASS INDEX: 27.55 KG/M2 | OXYGEN SATURATION: 92 % | WEIGHT: 140.3 LBS | SYSTOLIC BLOOD PRESSURE: 138 MMHG | HEIGHT: 60 IN

## 2024-11-06 DIAGNOSIS — I25.10 CORONARY ARTERY DISEASE INVOLVING NATIVE CORONARY ARTERY OF NATIVE HEART WITHOUT ANGINA PECTORIS: ICD-10-CM

## 2024-11-06 DIAGNOSIS — I50.22 CHRONIC SYSTOLIC HEART FAILURE: Primary | ICD-10-CM

## 2024-11-06 DIAGNOSIS — N18.30 STAGE 3 CHRONIC KIDNEY DISEASE, UNSPECIFIED WHETHER STAGE 3A OR 3B CKD: ICD-10-CM

## 2024-11-06 DIAGNOSIS — E78.00 PURE HYPERCHOLESTEROLEMIA: ICD-10-CM

## 2024-11-06 DIAGNOSIS — I10 ESSENTIAL HYPERTENSION: ICD-10-CM

## 2024-11-06 RX ORDER — LEVOTHYROXINE SODIUM 150 UG/1
150 TABLET ORAL DAILY
Qty: 90 TABLET | Refills: 1 | Status: SHIPPED | OUTPATIENT
Start: 2024-11-06

## 2024-11-06 NOTE — PROGRESS NOTES
[facility] Cardiology Office Follow Up Note    Sallie Daily  [id number]  2024    Primary Care Provider: Minnie Avila DO    Chief Complaint: Routine follow-up    History of Present Illness:   Mrs. Sallie Daily is a 79 y.o. female who presents to the Cardiology Clinic for follow-up of hypertension.  The patient has a past medical history significant for hyperlipidemia, hypothyroidism, carotid artery disease with prior endarterectomy, stage III chronic kidney disease, and difficult to control hypertension.  She has a past cardiac history significant for coronary artery disease based on a coronary angiogram in .  She also has a history of chronic systolic heart failure, with her last echocardiogram showing her LVEF 15-20%.  She has previously and continues to decline referral for ICD.  Today, the patient appears to be doing well from a cardiac standpoint.  She has not had any recent difficulty with recurrent lower extremity edema.  No orthopnea or PND.  She currently denies significant dyspnea.  No reported chest pain or chest discomfort.  She is tolerating her current medications without difficulty.  No specific complaints.     Past Cardiac Testin. Last Coronary Angio:               1.  30% stenosis in the mid left main              2.  50% stenosis in the ostial LCx              3.  50% stenosis in the RPDA  2. Prior Stress Testing: 2020              1.  No evidence of inducible ischemia  3. Last Echo:               1.  Severely reduced LV systolic function, LVEF 15-20%              2.  Grade 1 diastolic dysfunction              3.  Mild MR   4.  Mild to moderate TR  4. Prior Holter Monitor: None    Review of Systems:   Review of Systems   Constitutional:  Negative for activity change, appetite change, chills, diaphoresis, fatigue, fever, unexpected weight gain and unexpected weight loss.   Eyes:  Negative for blurred vision and double vision.    Respiratory:  Negative for cough, chest tightness, shortness of breath and wheezing.    Cardiovascular:  Negative for chest pain, palpitations and leg swelling.   Gastrointestinal:  Negative for abdominal pain, anal bleeding, blood in stool and GERD.   Endocrine: Negative for cold intolerance and heat intolerance.   Genitourinary:  Negative for hematuria.   Neurological:  Negative for dizziness, syncope, weakness and light-headedness.   Hematological:  Does not bruise/bleed easily.   Psychiatric/Behavioral:  Negative for depressed mood and stress. The patient is not nervous/anxious.        I have reviewed and/or updated the patient's past medical, past surgical, family, social history, problem list and allergies as appropriate.     Medications:     Current Outpatient Medications:     albuterol sulfate  (90 Base) MCG/ACT inhaler, , Disp: , Rfl:     ammonium lactate (LAC-HYDRIN) 12 % lotion, Apply  topically to the appropriate area as directed As Needed for Dry Skin., Disp: 225 g, Rfl: 0    ascorbic acid (VITAMIN C) 250 MG chewable tablet chewable tablet, Chew 1 tablet., Disp: , Rfl:     carvedilol (COREG) 25 MG tablet, Take 1 tablet by mouth 2 (Two) Times a Day With Meals., Disp: 180 tablet, Rfl: 0    digoxin (LANOXIN) 125 MCG tablet, Take 1 tablet by mouth Every Other Day., Disp: 45 tablet, Rfl: 1    diphenhydrAMINE-acetaminophen (TYLENOL PM)  MG tablet per tablet, Take 1 tablet by mouth., Disp: , Rfl:     ferrous gluconate (FERGON) 324 MG tablet, Take 1 tablet by mouth., Disp: , Rfl:     furosemide (LASIX) 20 MG tablet, TAKE 1 TABLET BY MOUTH DAILY, Disp: 90 tablet, Rfl: 3    levocetirizine (XYZAL) 5 MG tablet, Take 1 tablet by mouth Every Evening., Disp: , Rfl:     levothyroxine (SYNTHROID, LEVOTHROID) 150 MCG tablet, Take 1 tablet by mouth Daily., Disp: 90 tablet, Rfl: 1    levothyroxine (SYNTHROID, LEVOTHROID) 175 MCG tablet, Take 1 tablet by mouth Daily., Disp: 90 tablet, Rfl: 1    loperamide  "(IMODIUM) 2 MG capsule, Take 1 capsule by mouth 4 (Four) Times a Day As Needed for Diarrhea., Disp: 180 capsule, Rfl: 1    MegaFood Blood Builder tablet, Take  by mouth., Disp: , Rfl:     Melatonin 10 MG capsule, melatonin 10 mg capsule  Take 1 capsule every day by oral route., Disp: , Rfl:     pramipexole (MIRAPEX) 0.5 MG tablet, Take 1 tablet by mouth 3 (Three) Times a Day., Disp: 270 tablet, Rfl: 1    sacubitril-valsartan (ENTRESTO) 24-26 MG tablet, Take 1 tablet by mouth Every 12 (Twelve) Hours., Disp: 180 tablet, Rfl: 1    sertraline (ZOLOFT) 100 MG tablet, Take 1 tablet by mouth Daily., Disp: 90 tablet, Rfl: 1    spironolactone (ALDACTONE) 25 MG tablet, Take 0.5 tablets by mouth Daily., Disp: 45 tablet, Rfl: 1    Vitamins-Lipotropics (LIPO FLAVONOID PLUS PO), Take  by mouth., Disp: , Rfl:     empagliflozin (JARDIANCE) 10 MG tablet tablet, Take 1 tablet by mouth Daily. (Patient not taking: Reported on 11/6/2024), Disp: 30 tablet, Rfl: 0    Physical Exam:  Vital Signs:   Vitals:    11/06/24 1542   BP: 138/66   BP Location: Right arm   Patient Position: Sitting   Cuff Size: Adult   Pulse: 58   SpO2: 92%   Weight: 63.6 kg (140 lb 4.8 oz)   Height: 152.4 cm (60\")       Physical Exam  Constitutional:       General: She is not in acute distress.     Appearance: Normal appearance. She is well-developed. She is not diaphoretic.   HENT:      Head: Normocephalic and atraumatic.   Eyes:      General: No scleral icterus.     Pupils: Pupils are equal, round, and reactive to light.   Neck:      Trachea: No tracheal deviation.   Cardiovascular:      Rate and Rhythm: Normal rate and regular rhythm.      Heart sounds: Normal heart sounds. No murmur heard.     No friction rub. No gallop.      Comments: Normal JVD.  Pulmonary:      Effort: Pulmonary effort is normal. No respiratory distress.      Breath sounds: Normal breath sounds. No stridor. No wheezing or rales.   Chest:      Chest wall: No tenderness.   Abdominal:      " General: Bowel sounds are normal. There is no distension.      Palpations: Abdomen is soft.      Tenderness: There is no abdominal tenderness. There is no guarding or rebound.   Musculoskeletal:         General: No swelling. Normal range of motion.      Cervical back: Neck supple. No tenderness.   Lymphadenopathy:      Cervical: No cervical adenopathy.   Skin:     General: Skin is warm and dry.      Findings: No erythema.   Neurological:      General: No focal deficit present.      Mental Status: She is alert and oriented to person, place, and time.   Psychiatric:         Mood and Affect: Mood normal.         Behavior: Behavior normal.         Results Review:   I reviewed the patient's new clinical results.        Assessment / Plan:     1.  Chronic systolic heart failure  -- Known history of systolic heart failure, LVEF previously 35% at the time of her coronary angiogram in 2021  -- More recent echocardiogram shows LVEF 15-20%  -- Appears to be euvolemic on exam today  -- Given decline in LVEF, discussed invasive ischemic evaluation however the patient has declined as she is not currently interested in any further invasive procedures  -- Continue carvedilol, Entresto, Aldactone, and digoxin    2. Coronary artery disease  -- History of moderate multivessel CAD based on coronary angiogram in 2021  -- No current anginal symptoms  -- Continue atorvastatin and aspirin    3.  Essential hypertension  -- Adequately controlled today     4.  Left bundle branch block  -- Chronic     5.  Pure hypercholesterolemia  -- Lipid profile in 7/21 showed LDL 69, HDL 49, triglycerides 198  --Continue statin     6.  Stage 3 chronic kidney disease, unspecified whether stage 3a or 3b CKD ([facility])  -- Creatinine 1.25 on last assessment in 8/24         Follow Up:   Return in about 6 months (around 5/6/2025).      Thank you for allowing me to participate in the care of your patient. Please to not hesitate to contact me with additional questions  or concerns.     UMAIR Orona MD  Interventional Cardiology   11/06/2024  15:40 EST

## 2024-11-06 NOTE — TELEPHONE ENCOUNTER
Patients Levothyroxine was increased to 175 mcg from 150 mcg when she was hospitalized in August. Which dose would you like her to be on?

## 2024-11-07 ENCOUNTER — TELEPHONE (OUTPATIENT)
Dept: CARDIOLOGY | Facility: CLINIC | Age: 79
End: 2024-11-07
Payer: MEDICARE

## 2024-11-07 NOTE — TELEPHONE ENCOUNTER
Patient was seen in the office yesterday, she wants to know if she needs to take her Digoxin every day instead of every other day?

## 2024-11-18 ENCOUNTER — DISEASE STATE MANAGEMENT VISIT (OUTPATIENT)
Dept: PHARMACY | Facility: HOSPITAL | Age: 79
End: 2024-11-18
Payer: MEDICARE

## 2024-11-19 ENCOUNTER — PATIENT MESSAGE (OUTPATIENT)
Dept: INTERNAL MEDICINE | Facility: CLINIC | Age: 79
End: 2024-11-19
Payer: MEDICARE

## 2024-11-19 DIAGNOSIS — G89.29 CHRONIC PAIN OF BOTH SHOULDERS: Primary | ICD-10-CM

## 2024-11-19 DIAGNOSIS — M25.512 CHRONIC PAIN OF BOTH SHOULDERS: Primary | ICD-10-CM

## 2024-11-19 DIAGNOSIS — M25.511 CHRONIC PAIN OF BOTH SHOULDERS: Primary | ICD-10-CM

## 2024-11-19 DIAGNOSIS — M65.30 TRIGGER FINGER, UNSPECIFIED FINGER, UNSPECIFIED LATERALITY: ICD-10-CM

## 2024-11-26 DIAGNOSIS — M25.511 PAIN OF BOTH SHOULDER JOINTS: Primary | ICD-10-CM

## 2024-11-26 DIAGNOSIS — M25.512 PAIN OF BOTH SHOULDER JOINTS: Primary | ICD-10-CM

## 2024-11-27 ENCOUNTER — OFFICE VISIT (OUTPATIENT)
Dept: ORTHOPEDIC SURGERY | Facility: CLINIC | Age: 79
End: 2024-11-27
Payer: MEDICARE

## 2024-11-27 VITALS
HEIGHT: 60 IN | BODY MASS INDEX: 29.25 KG/M2 | SYSTOLIC BLOOD PRESSURE: 157 MMHG | DIASTOLIC BLOOD PRESSURE: 59 MMHG | WEIGHT: 149 LBS

## 2024-11-27 DIAGNOSIS — M19.011 ARTHRITIS OF BOTH GLENOHUMERAL JOINTS: Primary | ICD-10-CM

## 2024-11-27 DIAGNOSIS — M67.813 TENDINOSIS OF RIGHT ROTATOR CUFF: ICD-10-CM

## 2024-11-27 DIAGNOSIS — M19.012 ARTHRITIS OF BOTH GLENOHUMERAL JOINTS: Primary | ICD-10-CM

## 2024-11-27 RX ORDER — LIDOCAINE HYDROCHLORIDE 20 MG/ML
2 INJECTION, SOLUTION INFILTRATION; PERINEURAL
Status: COMPLETED | OUTPATIENT
Start: 2024-11-27 | End: 2024-11-27

## 2024-11-27 RX ORDER — METHYLPREDNISOLONE ACETATE 40 MG/ML
40 INJECTION, SUSPENSION INTRA-ARTICULAR; INTRALESIONAL; INTRAMUSCULAR; SOFT TISSUE
Status: COMPLETED | OUTPATIENT
Start: 2024-11-27 | End: 2024-11-27

## 2024-11-27 RX ADMIN — METHYLPREDNISOLONE ACETATE 40 MG: 40 INJECTION, SUSPENSION INTRA-ARTICULAR; INTRALESIONAL; INTRAMUSCULAR; SOFT TISSUE at 16:14

## 2024-11-27 RX ADMIN — LIDOCAINE HYDROCHLORIDE 2 ML: 20 INJECTION, SOLUTION INFILTRATION; PERINEURAL at 16:14

## 2024-11-27 RX ADMIN — LIDOCAINE HYDROCHLORIDE 2 ML: 20 INJECTION, SOLUTION INFILTRATION; PERINEURAL at 16:15

## 2024-11-27 RX ADMIN — METHYLPREDNISOLONE ACETATE 40 MG: 40 INJECTION, SUSPENSION INTRA-ARTICULAR; INTRALESIONAL; INTRAMUSCULAR; SOFT TISSUE at 16:15

## 2024-11-27 NOTE — PROGRESS NOTES
Office Note     Name: Sallie Daily    : 1945     MRN: [id number]     Chief Complaint  Pain of the Right Shoulder (States she has been having pain for about six months, she has had previous surgeries on both shoulders. No recent injury.) and Pain of the Left Shoulder    Subjective     History of Present Illness:  Sallie Daily is a 79 y.o. female presenting today for evaluation of bilateral shoulder pain ongoing for 6 months on the right and many years on the left.  Patient states the symptoms have steadily been worsening.  She reports that she has had rotator cuff repair surgery on both shoulders.  The right in  and the left in 2019.  Both surgeries were done by different orthopedic surgeons.  Right shoulder done by an orthopedic surgeon in [place], left shoulder done by an orthopedic surgeon in [place].  Patient notes that she splits her time between [place] and [place], typically visiting [place] in the colder months.  Today she complains of pain in the right shoulder at the lateral aspect of the acromion.  She states this pain is mild compared to her left shoulder.  Her symptoms are exacerbated by overhead motions, internal rotation, and pushing and pulling.  Her left shoulder is more painful than the right.  She states that she has pain at rest and with activity in the left shoulder.  She also notes significantly reduced range of motion in her left shoulder, only able to lift her arm to approximately 90 degrees.  She notes that her left shoulder never significantly recovered following her rotator cuff surgery and she has had pain and reduced range of motion since her surgery.  She denies any recent treatment for either shoulder.  She is requesting injections today for symptomatic relief.  She notes that she is not currently interested in any surgical options.  She denies paresthesias in either upper extremity.  She denies any recent injury or inciting events.    Review of Systems      Past Medical History:   Diagnosis Date    Abnormal ECG 2021    Post neck surgery    Allergic Sulfa    Anemia     Arthritis     Asthma 24    Couldn’t breathe    Cataract Removed    Cellulitis     CHF (congestive heart failure)     Chronic kidney disease 2002    Colon polyp     Coronary artery disease     Congestion after neck surgery led to pneumonia which led to heart “heart attack”    Diverticulitis     Diverticulosis Several    GERD (gastroesophageal reflux disease)     Gout     Heart attack     3/2021  mi    HL (hearing loss)     Hyperlipidemia     Hypertension     Hyperthyroidism Caught early    Inflammatory bowel disease [employer] device    Low back pain     Obesity     Osteoporosis     Pneumonia     Renal insufficiency     Sleep apnea 2010    Thyroid disease     Urinary tract infection Once    Visual impairment Nearsighted        Past Surgical History:   Procedure Laterality Date    CAROTID ENDARTERECTOMY      COLON SURGERY  1.5 ft removed    COLONOSCOPY      2022, q1yr , [facility]    EYE SURGERY  Cataract/lens    HAND SURGERY  10/10 -    HERNIA REPAIR  5 times    HYSTERECTOMY   ?    JOINT REPLACEMENT  Knee, rotator cuff x 2    NECK SURGERY      REPLACEMENT TOTAL KNEE      ROTATOR CUFF REPAIR Right     2008    SHOULDER SURGERY Left     roator cuff repair, done by Dr. Flores in [place]    SINUS SURGERY      SPINE SURGERY  Upper spine    TONSILLECTOMY  1950 ?    TUBAL ABDOMINAL LIGATION         Family History   Problem Relation Age of Onset    Thyroid disease Mother     Osteoporosis Mother     Obesity Mother     Heart attack Mother     Arthritis Mother         Age 98 as of 2024    Obesity Father     Heart attack Father     Cancer Father          2019 at age. 89+, had Parkinson’s disease    Thyroid disease Sister     Thyroid disease Sister     Thyroid disease Daughter        Social History     Socioeconomic History    Marital status:     Tobacco Use    Smoking status: Former     Current packs/day: 0.00     Average packs/day: 1 pack/day for 42.1 years (42.1 ttl pk-yrs)     Types: Cigarettes     Start date: 10/1/1963     Quit date: 2005     Years since quittin.0    Smokeless tobacco: Never   Vaping Use    Vaping status: Never Used   Substance and Sexual Activity    Alcohol use: Not Currently     Comment: Glass of wine or beer occasionally    Drug use: Never    Sexual activity: Not Currently     Partners: Male     Birth control/protection: Tubal ligation, Birth control pill, Hysterectomy, Pill         Current Outpatient Medications:     albuterol sulfate  (90 Base) MCG/ACT inhaler, , Disp: , Rfl:     ammonium lactate (LAC-HYDRIN) 12 % lotion, Apply  topically to the appropriate area as directed As Needed for Dry Skin., Disp: 225 g, Rfl: 0    ascorbic acid (VITAMIN C) 250 MG chewable tablet chewable tablet, Chew 1 tablet., Disp: , Rfl:     carvedilol (COREG) 25 MG tablet, Take 1 tablet by mouth 2 (Two) Times a Day With Meals., Disp: 180 tablet, Rfl: 3    digoxin (LANOXIN) 125 MCG tablet, Take 1 tablet by mouth Every Other Day., Disp: 45 tablet, Rfl: 1    diphenhydrAMINE-acetaminophen (TYLENOL PM)  MG tablet per tablet, Take 1 tablet by mouth., Disp: , Rfl:     furosemide (LASIX) 20 MG tablet, TAKE 1 TABLET BY MOUTH DAILY, Disp: 90 tablet, Rfl: 3    levocetirizine (XYZAL) 5 MG tablet, Take 1 tablet by mouth Every Evening., Disp: , Rfl:     levothyroxine (SYNTHROID, LEVOTHROID) 150 MCG tablet, Take 1 tablet by mouth Daily., Disp: 90 tablet, Rfl: 1    loperamide (IMODIUM) 2 MG capsule, Take 1 capsule by mouth 4 (Four) Times a Day As Needed for Diarrhea., Disp: 180 capsule, Rfl: 1    MegaFood Blood Builder tablet, Take  by mouth., Disp: , Rfl:     Melatonin 10 MG capsule, melatonin 10 mg capsule  Take 1 capsule every day by oral route., Disp: , Rfl:     pramipexole (MIRAPEX) 0.5 MG tablet, Take 1 tablet by mouth 3 (Three)  "Times a Day., Disp: 270 tablet, Rfl: 3    sacubitril-valsartan (ENTRESTO) 24-26 MG tablet, Take 1 tablet by mouth Every 12 (Twelve) Hours., Disp: 180 tablet, Rfl: 1    sertraline (ZOLOFT) 100 MG tablet, Take 1 tablet by mouth Daily., Disp: 90 tablet, Rfl: 1    spironolactone (ALDACTONE) 25 MG tablet, Take 0.5 tablets by mouth Daily., Disp: 45 tablet, Rfl: 1    Vitamins-Lipotropics (LIPO FLAVONOID PLUS PO), Take  by mouth., Disp: , Rfl:     Allergies   Allergen Reactions    Sulfa Antibiotics Hives and Other (See Comments)    Statins Swelling           Objective   /59   Ht 152.4 cm (60\")   Wt 67.6 kg (149 lb)   BMI 29.10 kg/m²            Physical Exam  Right Shoulder Exam     Tenderness   The patient is experiencing tenderness in the acromion.    Range of Motion   Active abduction:  130   Extension:  50   External rotation:  70   Forward flexion:  160   Internal rotation 0 degrees:  L3   Internal rotation 90 degrees:  90     Muscle Strength   Abduction: 4/5   Internal rotation: 4/5   External rotation: 4/5   Supraspinatus: 4/5   Subscapularis: 4/5   Biceps: 4/5     Tests   Apprehension: negative  Avina test: positive  Cross arm: negative  Impingement: positive  Drop arm: negative  Sulcus: absent    Other   Erythema: absent  Sensation: normal  Pulse: present      Left Shoulder Exam     Tenderness   The patient is experiencing no tenderness.     Range of Motion   Active abduction:  70   Extension:  40   External rotation:  70   Forward flexion:  80   Internal rotation 0 degrees:  L5   Internal rotation 90 degrees:  90     Muscle Strength   Abduction: 3/5   Internal rotation: 3/5   External rotation: 3/5   Supraspinatus: 3/5   Subscapularis: 3/5   Biceps: 3/5     Tests   Apprehension: negative  Sulcus: absent    Other   Erythema: absent  Sensation: normal  Pulse: present     Comments:  Marked reduction of range of motion of the left shoulder did not allow for most special tests.           Extremity DVT signs " are negative by clinical screen.     Independent Review of Radiographic Studies:    Three-view plain films of both shoulders were done in office today for the evaluation of pain and joint condition, no comparison views available.  No acute osseous abnormalities are noted.  Postoperative changes are seen in both shoulders including subacromial decompression and distal clavicle excision.  No rotator cuff anchors are noted.  The right shoulder reveals subcortical cystic changes at the greater tuberosity as well as marked joint space narrowing at the glenohumeral joint.  There also appears to be some degree of subacromial space narrowing.  The left shoulder reveals a calcification just inferior and lateral to the greater tuberosity, this may reflect postsurgical changes and/or calcific degenerative changes or remote injury.  There is also marked glenohumeral joint space narrowing with osteophyte formation on the inferior aspect of the humeral head.  The subacromial space also appears reduced on the left shoulder.    Large Joint Arthrocentesis: L glenohumeral  Date/Time: 11/27/2024 4:14 PM  Consent given by: patient  Site marked: site marked  Timeout: Immediately prior to procedure a time out was called to verify the correct patient, procedure, equipment, support staff and site/side marked as required   Supporting Documentation  Indications: pain   Procedure Details  Location: shoulder - L glenohumeral  Preparation: Patient was prepped and draped in the usual sterile fashion  Needle size: 22 G  Medications administered: 40 mg methylPREDNISolone acetate 40 MG/ML; 2 mL lidocaine 2%  Patient tolerance: patient tolerated the procedure well with no immediate complications      Large Joint Arthrocentesis: R subacromial bursa  Date/Time: 11/27/2024 4:15 PM  Consent given by: patient  Site marked: site marked  Timeout: Immediately prior to procedure a time out was called to verify the correct patient, procedure, equipment,  support staff and site/side marked as required   Supporting Documentation  Indications: pain   Procedure Details  Location: shoulder - R subacromial bursa  Preparation: Patient was prepped and draped in the usual sterile fashion  Needle size: 22 G  Medications administered: 40 mg methylPREDNISolone acetate 40 MG/ML; 2 mL lidocaine 2%  Patient tolerance: patient tolerated the procedure well with no immediate complications        Assessment and Plan   Diagnoses and all orders for this visit:    1. Arthritis of both glenohumeral joints (Primary)    2. Tendinosis of right rotator cuff    Other orders  -     Large Joint Arthrocentesis: L glenohumeral  -     Large Joint Arthrocentesis: R subacromial bursa       Discussion of orthopedic goals  Orthopedic activities reviewed and patient expressed appreciation  Regular exercise as tolerated  Risk, benefits, and merits of treatment alternatives reviewed with the patient and questions answered  Patient guided on mobility and conditioning exercises  Ice, heat, and/or modalities as beneficial  Call or notify for any adverse effect from injection therapy  Reduced physical activity as appropriate and avoid offending activity  Weight bearing parameters reviewed    Recommendations/Plan:  Exercise, medications, injections, other patient advice, and return appointment as noted.  Patient is encouraged to call or return for any issues or concerns.    Return in about 3 months (around 2/27/2025), or if symptoms worsen or fail to improve.  Patient agreeable to call or return sooner for any concerns.

## 2024-12-04 ENCOUNTER — PATIENT ROUNDING (BHMG ONLY) (OUTPATIENT)
Dept: ORTHOPEDIC SURGERY | Facility: CLINIC | Age: 79
End: 2024-12-04
Payer: MEDICARE

## 2024-12-04 NOTE — PROGRESS NOTES
"December 4, 2024    Hello, may I speak with Sallie Kleinman?    My name is Hayley      I am  with [facility]  [address] [place] [id number] [phone].    Before we get started may I verify your date of birth? 1945    I am calling to officially welcome you to our practice and ask about your recent visit. Is this a good time to talk? yes    Tell me about your visit with us. What things went well?  \"appointment was great. Raymundo was amazing, explained everything. I was very impressed.\"       We're always looking for ways to make our patients' experiences even better. Do you have recommendations on ways we may improve?  no    Overall were you satisfied with your first visit to our practice? yes       I appreciate you taking the time to speak with me today. Is there anything else I can do for you? no      Thank you, and have a great day.      "

## 2024-12-18 ENCOUNTER — TELEPHONE (OUTPATIENT)
Dept: CARDIOLOGY | Facility: CLINIC | Age: 79
End: 2024-12-18
Payer: MEDICARE

## 2024-12-18 NOTE — TELEPHONE ENCOUNTER
The reason why she is on multiple medications for heart failure is because that is what the heart failure Society of [place] and the American College of cardiology recommend to prolong life, improve overall function, and reduce chance of hospitalizations.      $700 is unreasonable for a monthly prescription for anyone.  If she does not qualify for patient assistance, I can change this medication to losartan today.  Would she like for me to go ahead and do that?    Also, it looks like Dr. Orona wanted her to follow-up in May of next year.  But I cannot see where that appointment has been made.  Could someone assist with this?

## 2024-12-23 ENCOUNTER — TELEPHONE (OUTPATIENT)
Dept: CARDIOLOGY | Facility: CLINIC | Age: 79
End: 2024-12-23
Payer: MEDICARE

## 2024-12-23 DIAGNOSIS — I50.22 CHRONIC SYSTOLIC HEART FAILURE: Primary | ICD-10-CM

## 2024-12-23 RX ORDER — LOSARTAN POTASSIUM 25 MG/1
25 TABLET ORAL DAILY
Qty: 90 TABLET | Refills: 1 | Status: SHIPPED | OUTPATIENT
Start: 2024-12-23

## 2024-12-23 NOTE — TELEPHONE ENCOUNTER
Losartan sent for 90 days with 1 refill.  Please encourage patient to reestablish cardiac care in [place] if she chooses to stay there.

## 2024-12-31 RX ORDER — DIGOXIN 125 MCG
125 TABLET ORAL
Qty: 45 TABLET | Refills: 3 | Status: SHIPPED | OUTPATIENT
Start: 2024-12-31

## 2025-01-07 DIAGNOSIS — I50.22 CHRONIC SYSTOLIC HEART FAILURE: ICD-10-CM

## 2025-01-07 RX ORDER — LOSARTAN POTASSIUM 25 MG/1
25 TABLET ORAL DAILY
Qty: 90 TABLET | Refills: 3 | Status: SHIPPED | OUTPATIENT
Start: 2025-01-07

## 2025-02-24 RX ORDER — SPIRONOLACTONE 25 MG/1
12.5 TABLET ORAL DAILY
Qty: 45 TABLET | Refills: 3 | Status: SHIPPED | OUTPATIENT
Start: 2025-02-24

## 2025-02-24 RX ORDER — SERTRALINE HYDROCHLORIDE 100 MG/1
100 TABLET, FILM COATED ORAL DAILY
Qty: 90 TABLET | Refills: 3 | Status: SHIPPED | OUTPATIENT
Start: 2025-02-24

## 2025-02-24 RX ORDER — LOPERAMIDE HYDROCHLORIDE 2 MG/1
CAPSULE ORAL
Qty: 360 CAPSULE | Refills: 0 | Status: SHIPPED | OUTPATIENT
Start: 2025-02-24

## 2025-04-15 ENCOUNTER — TELEPHONE (OUTPATIENT)
Dept: PHARMACY | Facility: HOSPITAL | Age: 80
End: 2025-04-15
Payer: MEDICARE

## 2025-04-15 NOTE — TELEPHONE ENCOUNTER
Called patient and LVM to determine if she intends to continue with the heart failure clinic when she returns from [place].

## 2025-04-15 NOTE — TELEPHONE ENCOUNTER
Patient is in [place] and isn't sure when she is coming back to [place]. She will schedule an appointment with the heart failure clinic when she knows she will be back.     Of note: she is seeing cardiology in [place]

## 2025-06-06 RX ORDER — LOPERAMIDE HYDROCHLORIDE 2 MG/1
CAPSULE ORAL
Qty: 360 CAPSULE | Refills: 0 | Status: SHIPPED | OUTPATIENT
Start: 2025-06-06